# Patient Record
Sex: MALE | Race: BLACK OR AFRICAN AMERICAN | NOT HISPANIC OR LATINO | URBAN - METROPOLITAN AREA
[De-identification: names, ages, dates, MRNs, and addresses within clinical notes are randomized per-mention and may not be internally consistent; named-entity substitution may affect disease eponyms.]

---

## 2019-12-01 ENCOUNTER — EMERGENCY (EMERGENCY)
Facility: HOSPITAL | Age: 74
LOS: 1 days | Discharge: HOME | End: 2019-12-01
Attending: EMERGENCY MEDICINE | Admitting: EMERGENCY MEDICINE
Payer: MEDICARE

## 2019-12-01 VITALS
HEIGHT: 67 IN | HEART RATE: 68 BPM | WEIGHT: 253.09 LBS | OXYGEN SATURATION: 96 % | SYSTOLIC BLOOD PRESSURE: 143 MMHG | RESPIRATION RATE: 18 BRPM | TEMPERATURE: 98 F | DIASTOLIC BLOOD PRESSURE: 76 MMHG

## 2019-12-01 PROCEDURE — 71046 X-RAY EXAM CHEST 2 VIEWS: CPT | Mod: 26

## 2019-12-01 PROCEDURE — 99283 EMERGENCY DEPT VISIT LOW MDM: CPT | Mod: 25

## 2019-12-01 PROCEDURE — 71046 X-RAY EXAM CHEST 2 VIEWS: CPT

## 2019-12-01 PROCEDURE — 87631 RESP VIRUS 3-5 TARGETS: CPT

## 2019-12-01 RX ORDER — LISINOPRIL 2.5 MG/1
0 TABLET ORAL
Qty: 0 | Refills: 0 | DISCHARGE

## 2019-12-01 RX ORDER — SPIRONOLACTONE 25 MG/1
0 TABLET, FILM COATED ORAL
Qty: 0 | Refills: 0 | DISCHARGE

## 2019-12-01 RX ORDER — FLUTICASONE PROPIONATE 50 MCG
2 SPRAY, SUSPENSION NASAL
Qty: 1 | Refills: 0
Start: 2019-12-01

## 2019-12-01 RX ORDER — NEBIVOLOL HYDROCHLORIDE 5 MG/1
0 TABLET ORAL
Qty: 0 | Refills: 0 | DISCHARGE

## 2019-12-01 RX ORDER — METFORMIN HYDROCHLORIDE 850 MG/1
0 TABLET ORAL
Qty: 0 | Refills: 0 | DISCHARGE

## 2019-12-01 NOTE — ED PROVIDER NOTE - PATIENT PORTAL LINK FT
Statement Selected You can access the FollowMyHealth Patient Portal offered by [facility] by registering at the following website: [email or web address]. By joining [employer]’s FollowMyHealth portal, you will also be able to view your health information using other applications (apps) compatible with our system.

## 2019-12-01 NOTE — ED PROVIDER NOTE - OBJECTIVE STATEMENT
73 y/o M with PMHx of HTN, DM, Cirrhosis presents to the ED due to productive cough (yellow sputum), chills, rhinorrhea (clear) x 3 days. Patient states that today he had a feeling that he had something in his chest, and expelled a large ball of yellow phlegm, no feelings of bodies in chest after. Patient states he took Promethazine-DM without improvement of symptoms. Denies fever, sore throat, chest pain, SOB, ear pain, headaches, nausea, vomiting, diarrhea, abdominal pain, urinary symptoms. Patient with recent sick contacts, concerned for shingles due to fiance having shingles recently. Former smoker but quit in 1990. 75 y/o M with PMHx of HTN, DM, Cirrhosis presents to the ED due to productive cough (initially clear, today w yellow sputum), chills, rhinorrhea (clear) x 3 days. Patient states that today he had a feeling that he had something in his chest, and expelled a large ball of yellow phlegm, no feelings of foreign bodies in chest after. Patient states he took Promethazine-DM with some improvement of symptoms. Denies fever, + chills, no sore throat, chest pain, SOB, ear pain, headaches, nausea, vomiting, diarrhea, abdominal pain, urinary symptoms. Patient with recent sick contacts w uri sx, concerned for shingles due to fiance having shingles recently. Former smoker but quit in 1990.

## 2019-12-01 NOTE — ED PROVIDER NOTE - NSFOLLOWUPINSTRUCTIONS_ED_ALL_ED_FT
URI    Rest.  Drink plenty of fluids.  Try over the counter medications based on your symptoms; use as directed: coricidin hbp, tylenol and/or ibuprofen or similar for fever, body aches, pain.  Your symptoms will likely last for a total of 7-10 days.      Use flonase (fluticasone) -  2 sprays each nostril once a day for nasal congestion as needed.   Take Tessalon Perles 1 tab every 8 hours as needed for cough.     Follow up with your pmd.  Return for increased pain, difficulty breathing, vomiting, any other concerns.    Viral Respiratory Infection    A viral respiratory infection is an illness that affects parts of the body used for breathing, like the lungs, nose, and throat. It is caused by a germ called a virus. Symptoms can include runny nose, coughing, sneezing, fatigue, body aches, sore throat, fever, or headache. Over the counter medicine can be used to manage the symptoms but the infection typically goes away on its own in 5 to 10 days.     SEEK IMMEDIATE MEDICAL CARE IF YOU HAVE ANY OF THE FOLLOWING SYMPTOMS: shortness of breath, chest pain, fever over 10 days, or lightheadedness/dizziness.

## 2019-12-01 NOTE — ED PROVIDER NOTE - DIAGNOSTIC INTERPRETATION
ER Physician:  Ni Director  CHEST XRAY INTERPRETATION: lungs clear, heart shadow normal, bony structures intact

## 2019-12-01 NOTE — ED PROVIDER NOTE - MUSCULOSKELETAL, MLM
Spine appears normal, range of motion is not limited, no muscle or joint tenderness Spine appears normal, range of motion is not limited, no muscle or joint tenderness, no c/c/e/ttp bilat le

## 2019-12-01 NOTE — ED PROVIDER NOTE - CLINICAL SUMMARY MEDICAL DECISION MAKING FREE TEXT BOX
73 y/o M presents with productive cough, rhinorrhea, chills. Exam unremarkable, likely viral bronchitis causing sputum. Likely viral infection causing flu-like symptoms, will get RSV to r/o flu. CXR to r/o PNA. 73 y/o M presents with productive cough, rhinorrhea, chills. Exam unremarkable, likely viral bronchitis causing sputum. Likely viral infection causing flu-like symptoms, will get flu swab to r/o flu. CXR to r/o PNA.  Patient likely has a viral Upper respiratory infection; no evidence of pneumonia, sepsis or meningitis;   The patient is non toxic appearing. Supportive care including increased fluids and over the counter therapy was advised and discussed.

## 2019-12-01 NOTE — ED PROVIDER NOTE - ENMT, MLM
Pharynx nonerythematous. Airway patent, Nasal mucosa clear. Mouth with normal mucosa. Throat has no vesicles, no oropharyngeal exudates and uvula is midline.

## 2019-12-02 LAB
FLU A RESULT: SIGNIFICANT CHANGE UP
FLU A RESULT: SIGNIFICANT CHANGE UP
FLUAV AG NPH QL: SIGNIFICANT CHANGE UP
FLUBV AG NPH QL: SIGNIFICANT CHANGE UP
RSV RESULT: SIGNIFICANT CHANGE UP
RSV RNA RESP QL NAA+PROBE: SIGNIFICANT CHANGE UP

## 2019-12-06 DIAGNOSIS — J06.9 ACUTE UPPER RESPIRATORY INFECTION, UNSPECIFIED: ICD-10-CM

## 2019-12-06 DIAGNOSIS — I10 ESSENTIAL (PRIMARY) HYPERTENSION: ICD-10-CM

## 2019-12-06 DIAGNOSIS — E11.9 TYPE 2 DIABETES MELLITUS WITHOUT COMPLICATIONS: ICD-10-CM

## 2019-12-06 DIAGNOSIS — R05 COUGH: ICD-10-CM

## 2019-12-06 DIAGNOSIS — Z79.4 LONG TERM (CURRENT) USE OF INSULIN: ICD-10-CM

## 2019-12-06 DIAGNOSIS — Z79.899 OTHER LONG TERM (CURRENT) DRUG THERAPY: ICD-10-CM

## 2020-10-08 ENCOUNTER — EMERGENCY (EMERGENCY)
Facility: HOSPITAL | Age: 75
LOS: 1 days | Discharge: HOME | End: 2020-10-08
Attending: EMERGENCY MEDICINE | Admitting: EMERGENCY MEDICINE
Payer: COMMERCIAL

## 2020-10-08 VITALS
OXYGEN SATURATION: 97 % | HEART RATE: 62 BPM | WEIGHT: 229.94 LBS | DIASTOLIC BLOOD PRESSURE: 71 MMHG | HEIGHT: 67 IN | SYSTOLIC BLOOD PRESSURE: 117 MMHG | TEMPERATURE: 98 F | RESPIRATION RATE: 18 BRPM

## 2020-10-08 VITALS
DIASTOLIC BLOOD PRESSURE: 60 MMHG | RESPIRATION RATE: 17 BRPM | SYSTOLIC BLOOD PRESSURE: 97 MMHG | OXYGEN SATURATION: 98 % | HEART RATE: 63 BPM | TEMPERATURE: 98 F

## 2020-10-08 DIAGNOSIS — Z79.4 LONG TERM (CURRENT) USE OF INSULIN: ICD-10-CM

## 2020-10-08 DIAGNOSIS — R07.81 PLEURODYNIA: ICD-10-CM

## 2020-10-08 DIAGNOSIS — I10 ESSENTIAL (PRIMARY) HYPERTENSION: ICD-10-CM
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
## 2020-10-08 DIAGNOSIS — Z79.899 OTHER LONG TERM (CURRENT) DRUG THERAPY: ICD-10-CM
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
## 2020-10-08 DIAGNOSIS — S20.212A CONTUSION OF LEFT FRONT WALL OF THORAX, INITIAL ENCOUNTER: ICD-10-CM
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
## 2020-10-08 DIAGNOSIS — E11.9 TYPE 2 DIABETES MELLITUS WITHOUT COMPLICATIONS: ICD-10-CM
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
## 2020-10-08 PROBLEM — K74.60 UNSPECIFIED CIRRHOSIS OF LIVER: Chronic | Status: ACTIVE | Noted: 2019-12-01

## 2020-10-08 PROCEDURE — 71101 X-RAY EXAM UNILAT RIBS/CHEST: CPT

## 2020-10-08 PROCEDURE — 99284 EMERGENCY DEPT VISIT MOD MDM: CPT | Mod: 25

## 2020-10-08 PROCEDURE — 99283 EMERGENCY DEPT VISIT LOW MDM: CPT

## 2020-10-08 PROCEDURE — 71101 X-RAY EXAM UNILAT RIBS/CHEST: CPT | Mod: 26,LT

## 2020-10-08 PROCEDURE — 71100 X-RAY EXAM RIBS UNI 2 VIEWS: CPT | Mod: 26

## 2020-10-08 RX ORDER — ACETAMINOPHEN 500 MG
650 TABLET ORAL ONCE
Refills: 0 | Status: COMPLETED | OUTPATIENT
Start: 2020-10-08 | End: 2020-10-08

## 2020-10-08 RX ADMIN — Medication 650 MILLIGRAM(S): at 10:32

## 2020-10-08 NOTE — ED ADULT NURSE NOTE - CHPI ED NUR SYMPTOMS NEG
no disorientation/no laceration/no neck tenderness/no headache/no difficulty bearing weight/no decreased eating/drinking/no loss of consciousness/no dizziness

## 2020-10-08 NOTE — ED ADULT NURSE NOTE - NSIMPLEMENTINTERV_GEN_ALL_ED
Implemented All Universal Safety Interventions:  [place] to call system. Call bell, personal items and telephone within reach. Instruct patient to call for assistance. Room bathroom lighting operational. Non-slip footwear when patient is off stretcher. Physically safe environment: no spills, clutter or unnecessary equipment. Stretcher in lowest position, wheels locked, appropriate side rails in place.
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
## 2020-10-08 NOTE — ED PROVIDER NOTE - PATIENT PORTAL LINK FT
You can access the FollowMyHealth Patient Portal offered by [facility] by registering at the following website: [email or web address]. By joining [employer]’s FollowMyHealth portal, you will also be able to view your health information using other applications (apps) compatible with our system.
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
## 2020-10-08 NOTE — ED PROVIDER NOTE - PHYSICAL EXAMINATION
CONSTITUTIONAL: Well-appearing; well-nourished; in no apparent distress.   HEAD: Normocephalic; atraumatic.   EYES: PERRL; EOM intact; conjunctiva and sclera clear  ENT: normal nose; no rhinorrhea; normal pharynx with no erythema or lesions.   NECK: Supple; non-tender;   CARDIOVASCULAR: Normal S1, S2; no murmurs, rubs, or gallops. Regular rate and rhythm.   RESPIRATORY: Breathing easily; breath sounds clear and equal bilaterally; no wheezes, rhonchi, or rales.  MSK: Tenderness to left lateral lower rib. No deformity or step-off.   EXT: No cyanosis or edema; N/V intact  SKIN: Normal for age and race; warm; dry; good turgor; no apparent lesions or rash.

## 2020-10-08 NOTE — ED PROVIDER NOTE - DIAGNOSTIC INTERPRETATION
ER PA: Julia Sim  rib xr INTERPRETATION:  no acute fracture; no soft tissue swelling noted; normal bony alignment.
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
## 2022-03-11 NOTE — ED ADULT NURSE NOTE - DOES PATIENT HAVE ADVANCE DIRECTIVE
Impression: Age-related nuclear cataract, left eye: H25.12. Plan: Patient notes glare. Exam demonstrates a significant PSC cataract OS. Discussed R/B/A of surgery vs observation. Rec evaluation for surgery OS.
Impression: Presence of intraocular lens: Z96.1. Plan: PC IOL in good position. Observe.
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 OD: s/p surgery x 2, last 10/2/2020- NVP, PPV/MP/SOin OS: s/p PPV/MP/ EL (EJQ) 7/15/2020 Plan: + fibrosis OD under SO. Will have patient follow up with NVP to decide whether SO removal is indicated OD. Retina appears stable OS. Will re-evaluate after eval improved after cataract surgery Os. 

RTC 3 mo with OCT OU, poss Avastin (NVP)
No

## 2023-07-13 NOTE — ED PROVIDER NOTE - AGGRAVATING FACTORS
Consultation - GI   Bharat Crocker 66 y.o. male MRN: [id number]  Unit/Bed#: -01 Encounter: [id number]      Assessment/Plan     Assessment:  Bharat Crocker is a 66 [facility]. Gabriela Dupont DO  Consult ordered by: Shelton Rodríguez none

## 2024-09-05 ENCOUNTER — INPATIENT (INPATIENT)
Facility: HOSPITAL | Age: 79
LOS: 1 days | Discharge: HOME | DRG: 690 | End: 2024-09-07
Attending: STUDENT IN AN ORGANIZED HEALTH CARE EDUCATION/TRAINING PROGRAM | Admitting: STUDENT IN AN ORGANIZED HEALTH CARE EDUCATION/TRAINING PROGRAM
Payer: MEDICARE

## 2024-09-05 VITALS — WEIGHT: 218.04 LBS | HEIGHT: 67.5 IN

## 2024-09-05 DIAGNOSIS — Z29.9 ENCOUNTER FOR PROPHYLACTIC MEASURES, UNSPECIFIED: ICD-10-CM

## 2024-09-05 DIAGNOSIS — H81.10 BENIGN PAROXYSMAL VERTIGO, UNSPECIFIED EAR: ICD-10-CM

## 2024-09-05 DIAGNOSIS — E11.9 TYPE 2 DIABETES MELLITUS WITHOUT COMPLICATIONS: ICD-10-CM

## 2024-09-05 DIAGNOSIS — E78.5 HYPERLIPIDEMIA, UNSPECIFIED: ICD-10-CM

## 2024-09-05 DIAGNOSIS — I10 ESSENTIAL (PRIMARY) HYPERTENSION: ICD-10-CM

## 2024-09-05 DIAGNOSIS — Z87.438 PERSONAL HISTORY OF OTHER DISEASES OF MALE GENITAL ORGANS: ICD-10-CM

## 2024-09-05 DIAGNOSIS — I25.10 ATHEROSCLEROTIC HEART DISEASE OF NATIVE CORONARY ARTERY WITHOUT ANGINA PECTORIS: ICD-10-CM

## 2024-09-05 DIAGNOSIS — R26.81 UNSTEADINESS ON FEET: ICD-10-CM

## 2024-09-05 DIAGNOSIS — N39.0 URINARY TRACT INFECTION, SITE NOT SPECIFIED: ICD-10-CM

## 2024-09-05 DIAGNOSIS — G62.9 POLYNEUROPATHY, UNSPECIFIED: ICD-10-CM

## 2024-09-05 LAB
ADD ON TEST-SPECIMEN IN LAB: SIGNIFICANT CHANGE UP
ANION GAP SERPL CALC-SCNC: 10 MMOL/L — SIGNIFICANT CHANGE UP (ref 5–17)
APPEARANCE UR: CLEAR — SIGNIFICANT CHANGE UP
BACTERIA # UR AUTO: ABNORMAL /HPF
BASOPHILS # BLD AUTO: 0.05 K/UL — SIGNIFICANT CHANGE UP (ref 0–0.2)
BASOPHILS NFR BLD AUTO: 0.6 % — SIGNIFICANT CHANGE UP (ref 0–2)
BILIRUB UR-MCNC: NEGATIVE — SIGNIFICANT CHANGE UP
BUN SERPL-MCNC: 11 MG/DL — SIGNIFICANT CHANGE UP (ref 7–23)
CALCIUM SERPL-MCNC: 9.7 MG/DL — SIGNIFICANT CHANGE UP (ref 8.4–10.5)
CAST: 1 /LPF — SIGNIFICANT CHANGE UP (ref 0–4)
CHLORIDE SERPL-SCNC: 98 MMOL/L — SIGNIFICANT CHANGE UP (ref 96–108)
CO2 SERPL-SCNC: 28 MMOL/L — SIGNIFICANT CHANGE UP (ref 22–31)
COLOR SPEC: YELLOW — SIGNIFICANT CHANGE UP
CREAT SERPL-MCNC: 1.09 MG/DL — SIGNIFICANT CHANGE UP (ref 0.5–1.3)
DIFF PNL FLD: NEGATIVE — SIGNIFICANT CHANGE UP
EGFR: 70 ML/MIN/1.73M2 — SIGNIFICANT CHANGE UP
EGFR: 70 ML/MIN/1.73M2 — SIGNIFICANT CHANGE UP
EOSINOPHIL # BLD AUTO: 0.14 K/UL — SIGNIFICANT CHANGE UP (ref 0–0.5)
EOSINOPHIL NFR BLD AUTO: 1.6 % — SIGNIFICANT CHANGE UP (ref 0–6)
GLUCOSE SERPL-MCNC: 95 MG/DL — SIGNIFICANT CHANGE UP (ref 70–99)
GLUCOSE UR QL: NEGATIVE MG/DL — SIGNIFICANT CHANGE UP
HCT VFR BLD CALC: 43.1 % — SIGNIFICANT CHANGE UP (ref 39–50)
HGB BLD-MCNC: 14.1 G/DL — SIGNIFICANT CHANGE UP (ref 13–17)
IMM GRANULOCYTES NFR BLD AUTO: 0.2 % — SIGNIFICANT CHANGE UP (ref 0–0.9)
KETONES UR-MCNC: NEGATIVE MG/DL — SIGNIFICANT CHANGE UP
LEUKOCYTE ESTERASE UR-ACNC: ABNORMAL
LYMPHOCYTES # BLD AUTO: 1.86 K/UL — SIGNIFICANT CHANGE UP (ref 1–3.3)
LYMPHOCYTES # BLD AUTO: 21.7 % — SIGNIFICANT CHANGE UP (ref 13–44)
MCHC RBC-ENTMCNC: 28.5 PG — SIGNIFICANT CHANGE UP (ref 27–34)
MCHC RBC-ENTMCNC: 32.7 GM/DL — SIGNIFICANT CHANGE UP (ref 32–36)
MCV RBC AUTO: 87.1 FL — SIGNIFICANT CHANGE UP (ref 80–100)
MONOCYTES # BLD AUTO: 0.73 K/UL — SIGNIFICANT CHANGE UP (ref 0–0.9)
MONOCYTES NFR BLD AUTO: 8.5 % — SIGNIFICANT CHANGE UP (ref 2–14)
NEUTROPHILS # BLD AUTO: 5.76 K/UL — SIGNIFICANT CHANGE UP (ref 1.8–7.4)
NEUTROPHILS NFR BLD AUTO: 67.4 % — SIGNIFICANT CHANGE UP (ref 43–77)
NITRITE UR-MCNC: POSITIVE
NRBC # BLD: 0 /100 WBCS — SIGNIFICANT CHANGE UP (ref 0–0)
NRBC BLD-RTO: 0 /100 WBCS — SIGNIFICANT CHANGE UP (ref 0–0)
PH UR: 6 — SIGNIFICANT CHANGE UP (ref 5–8)
PLATELET # BLD AUTO: 293 K/UL — SIGNIFICANT CHANGE UP (ref 150–400)
POTASSIUM SERPL-MCNC: 3.7 MMOL/L — SIGNIFICANT CHANGE UP (ref 3.5–5.3)
POTASSIUM SERPL-SCNC: 3.7 MMOL/L — SIGNIFICANT CHANGE UP (ref 3.5–5.3)
PROT UR-MCNC: SIGNIFICANT CHANGE UP MG/DL
RBC # BLD: 4.95 M/UL — SIGNIFICANT CHANGE UP (ref 4.2–5.8)
RBC # FLD: 13.2 % — SIGNIFICANT CHANGE UP (ref 10.3–14.5)
RBC CASTS # UR COMP ASSIST: 1 /HPF — SIGNIFICANT CHANGE UP (ref 0–4)
SODIUM SERPL-SCNC: 136 MMOL/L — SIGNIFICANT CHANGE UP (ref 135–145)
SP GR SPEC: 1.01 — SIGNIFICANT CHANGE UP (ref 1–1.03)
SQUAMOUS # UR AUTO: 1 /HPF — SIGNIFICANT CHANGE UP (ref 0–5)
TROPONIN T, HIGH SENSITIVITY RESULT: 14 NG/L — SIGNIFICANT CHANGE UP (ref 0–51)
UROBILINOGEN FLD QL: 0.2 MG/DL — SIGNIFICANT CHANGE UP (ref 0.2–1)
WBC # BLD: 8.56 K/UL — SIGNIFICANT CHANGE UP (ref 3.8–10.5)
WBC # FLD AUTO: 8.56 K/UL — SIGNIFICANT CHANGE UP (ref 3.8–10.5)
WBC UR QL: 55 /HPF — HIGH (ref 0–5)

## 2024-09-05 PROCEDURE — 70498 CT ANGIOGRAPHY NECK: CPT | Mod: 26,MC

## 2024-09-05 PROCEDURE — 71045 X-RAY EXAM CHEST 1 VIEW: CPT | Mod: 26

## 2024-09-05 PROCEDURE — 99285 EMERGENCY DEPT VISIT HI MDM: CPT

## 2024-09-05 PROCEDURE — 99223 1ST HOSP IP/OBS HIGH 75: CPT

## 2024-09-05 PROCEDURE — 70496 CT ANGIOGRAPHY HEAD: CPT | Mod: 26,MC

## 2024-09-05 RX ORDER — LOSARTAN POTASSIUM 100 MG/1
25 TABLET, FILM COATED ORAL DAILY
Refills: 0 | Status: DISCONTINUED | OUTPATIENT
Start: 2024-09-05 | End: 2024-09-06

## 2024-09-05 RX ORDER — CEFTRIAXONE 500 MG/1
1000 INJECTION, POWDER, FOR SOLUTION INTRAMUSCULAR; INTRAVENOUS ONCE
Refills: 0 | Status: COMPLETED | OUTPATIENT
Start: 2024-09-05 | End: 2024-09-05

## 2024-09-05 RX ORDER — ALBUTEROL SULFATE 2.5 MG/3ML
2 VIAL, NEBULIZER (ML) INHALATION EVERY 6 HOURS
Refills: 0 | Status: DISCONTINUED | OUTPATIENT
Start: 2024-09-05 | End: 2024-09-07

## 2024-09-05 RX ORDER — SPIRONOLACTONE 25 MG
50 TABLET ORAL DAILY
Refills: 0 | Status: DISCONTINUED | OUTPATIENT
Start: 2024-09-05 | End: 2024-09-06

## 2024-09-05 RX ORDER — GABAPENTIN 400 MG/1
300 CAPSULE ORAL THREE TIMES A DAY
Refills: 0 | Status: DISCONTINUED | OUTPATIENT
Start: 2024-09-05 | End: 2024-09-07

## 2024-09-05 RX ORDER — FINASTERIDE 1 MG/1
5 TABLET, FILM COATED ORAL DAILY
Refills: 0 | Status: DISCONTINUED | OUTPATIENT
Start: 2024-09-05 | End: 2024-09-07

## 2024-09-05 RX ORDER — ENOXAPARIN SODIUM 100 MG/ML
40 INJECTION SUBCUTANEOUS EVERY 24 HOURS
Refills: 0 | Status: DISCONTINUED | OUTPATIENT
Start: 2024-09-05 | End: 2024-09-07

## 2024-09-05 RX ORDER — TAMSULOSIN HYDROCHLORIDE 0.4 MG/1
0.4 CAPSULE ORAL AT BEDTIME
Refills: 0 | Status: DISCONTINUED | OUTPATIENT
Start: 2024-09-05 | End: 2024-09-07

## 2024-09-05 RX ORDER — ASPIRIN 325 MG
81 TABLET ORAL DAILY
Refills: 0 | Status: DISCONTINUED | OUTPATIENT
Start: 2024-09-05 | End: 2024-09-07

## 2024-09-05 RX ADMIN — ENOXAPARIN SODIUM 40 MILLIGRAM(S): 100 INJECTION SUBCUTANEOUS at 23:48

## 2024-09-05 RX ADMIN — Medication 1000 MILLILITER(S): at 17:31

## 2024-09-05 RX ADMIN — CEFTRIAXONE 100 MILLIGRAM(S): 500 INJECTION, POWDER, FOR SOLUTION INTRAMUSCULAR; INTRAVENOUS at 18:50

## 2024-09-06 ENCOUNTER — TRANSCRIPTION ENCOUNTER (OUTPATIENT)
Age: 79
End: 2024-09-06

## 2024-09-06 PROBLEM — E11.9 TYPE 2 DIABETES MELLITUS WITHOUT COMPLICATIONS: Chronic | Status: ACTIVE | Noted: 2024-08-20

## 2024-09-06 PROBLEM — E78.5 HYPERLIPIDEMIA, UNSPECIFIED: Chronic | Status: ACTIVE | Noted: 2024-08-20

## 2024-09-06 PROBLEM — N40.0 BENIGN PROSTATIC HYPERPLASIA WITHOUT LOWER URINARY TRACT SYMPTOMS: Chronic | Status: ACTIVE | Noted: 2024-08-20

## 2024-09-06 PROBLEM — I25.10 ATHEROSCLEROTIC HEART DISEASE OF NATIVE CORONARY ARTERY WITHOUT ANGINA PECTORIS: Chronic | Status: ACTIVE | Noted: 2024-08-20

## 2024-09-06 LAB
ANION GAP SERPL CALC-SCNC: 10 MMOL/L — SIGNIFICANT CHANGE UP (ref 5–17)
BASOPHILS # BLD AUTO: 0.06 K/UL — SIGNIFICANT CHANGE UP (ref 0–0.2)
BASOPHILS NFR BLD AUTO: 0.9 % — SIGNIFICANT CHANGE UP (ref 0–2)
BUN SERPL-MCNC: 10 MG/DL — SIGNIFICANT CHANGE UP (ref 7–23)
CALCIUM SERPL-MCNC: 9.7 MG/DL — SIGNIFICANT CHANGE UP (ref 8.4–10.5)
CHLORIDE SERPL-SCNC: 102 MMOL/L — SIGNIFICANT CHANGE UP (ref 96–108)
CO2 SERPL-SCNC: 25 MMOL/L — SIGNIFICANT CHANGE UP (ref 22–31)
CREAT SERPL-MCNC: 0.84 MG/DL — SIGNIFICANT CHANGE UP (ref 0.5–1.3)
EGFR: 90 ML/MIN/1.73M2 — SIGNIFICANT CHANGE UP
EGFR: 90 ML/MIN/1.73M2 — SIGNIFICANT CHANGE UP
EOSINOPHIL # BLD AUTO: 0.12 K/UL — SIGNIFICANT CHANGE UP (ref 0–0.5)
EOSINOPHIL NFR BLD AUTO: 1.7 % — SIGNIFICANT CHANGE UP (ref 0–6)
GLUCOSE BLDC GLUCOMTR-MCNC: 114 MG/DL — HIGH (ref 70–99)
GLUCOSE BLDC GLUCOMTR-MCNC: 80 MG/DL — SIGNIFICANT CHANGE UP (ref 70–99)
GLUCOSE BLDC GLUCOMTR-MCNC: 97 MG/DL — SIGNIFICANT CHANGE UP (ref 70–99)
GLUCOSE SERPL-MCNC: 106 MG/DL — HIGH (ref 70–99)
HCT VFR BLD CALC: 41.9 % — SIGNIFICANT CHANGE UP (ref 39–50)
HGB BLD-MCNC: 13.4 G/DL — SIGNIFICANT CHANGE UP (ref 13–17)
IMM GRANULOCYTES NFR BLD AUTO: 0.3 % — SIGNIFICANT CHANGE UP (ref 0–0.9)
LYMPHOCYTES # BLD AUTO: 1.44 K/UL — SIGNIFICANT CHANGE UP (ref 1–3.3)
LYMPHOCYTES # BLD AUTO: 20.8 % — SIGNIFICANT CHANGE UP (ref 13–44)
MAGNESIUM SERPL-MCNC: 1.8 MG/DL — SIGNIFICANT CHANGE UP (ref 1.6–2.6)
MCHC RBC-ENTMCNC: 28.3 PG — SIGNIFICANT CHANGE UP (ref 27–34)
MCHC RBC-ENTMCNC: 32 GM/DL — SIGNIFICANT CHANGE UP (ref 32–36)
MCV RBC AUTO: 88.4 FL — SIGNIFICANT CHANGE UP (ref 80–100)
MONOCYTES # BLD AUTO: 0.56 K/UL — SIGNIFICANT CHANGE UP (ref 0–0.9)
MONOCYTES NFR BLD AUTO: 8.1 % — SIGNIFICANT CHANGE UP (ref 2–14)
NEUTROPHILS # BLD AUTO: 4.72 K/UL — SIGNIFICANT CHANGE UP (ref 1.8–7.4)
NEUTROPHILS NFR BLD AUTO: 68.2 % — SIGNIFICANT CHANGE UP (ref 43–77)
NRBC # BLD: 0 /100 WBCS — SIGNIFICANT CHANGE UP (ref 0–0)
NRBC BLD-RTO: 0 /100 WBCS — SIGNIFICANT CHANGE UP (ref 0–0)
PHOSPHATE SERPL-MCNC: 3.3 MG/DL — SIGNIFICANT CHANGE UP (ref 2.5–4.5)
PLATELET # BLD AUTO: 281 K/UL — SIGNIFICANT CHANGE UP (ref 150–400)
POTASSIUM SERPL-MCNC: 4 MMOL/L — SIGNIFICANT CHANGE UP (ref 3.5–5.3)
POTASSIUM SERPL-SCNC: 4 MMOL/L — SIGNIFICANT CHANGE UP (ref 3.5–5.3)
RBC # BLD: 4.74 M/UL — SIGNIFICANT CHANGE UP (ref 4.2–5.8)
RBC # FLD: 13.3 % — SIGNIFICANT CHANGE UP (ref 10.3–14.5)
SODIUM SERPL-SCNC: 137 MMOL/L — SIGNIFICANT CHANGE UP (ref 135–145)
WBC # BLD: 6.92 K/UL — SIGNIFICANT CHANGE UP (ref 3.8–10.5)
WBC # FLD AUTO: 6.92 K/UL — SIGNIFICANT CHANGE UP (ref 3.8–10.5)

## 2024-09-06 PROCEDURE — 99233 SBSQ HOSP IP/OBS HIGH 50: CPT | Mod: GC

## 2024-09-06 RX ORDER — DEXTROSE 50 % IN WATER 50 %
15 SYRINGE (ML) INTRAVENOUS ONCE
Refills: 0 | Status: DISCONTINUED | OUTPATIENT
Start: 2024-09-06 | End: 2024-09-07

## 2024-09-06 RX ORDER — SODIUM CHLORIDE 9 G/1000ML
1000 INJECTION, SOLUTION INTRAVENOUS
Refills: 0 | Status: DISCONTINUED | OUTPATIENT
Start: 2024-09-06 | End: 2024-09-07

## 2024-09-06 RX ORDER — MELATONIN 5 MG
3 TABLET ORAL AT BEDTIME
Refills: 0 | Status: DISCONTINUED | OUTPATIENT
Start: 2024-09-06 | End: 2024-09-07

## 2024-09-06 RX ORDER — DEXTROSE 50 % IN WATER 50 %
25 SYRINGE (ML) INTRAVENOUS ONCE
Refills: 0 | Status: DISCONTINUED | OUTPATIENT
Start: 2024-09-06 | End: 2024-09-07

## 2024-09-06 RX ORDER — ACETAMINOPHEN 500 MG/5ML
650 LIQUID (ML) ORAL EVERY 6 HOURS
Refills: 0 | Status: DISCONTINUED | OUTPATIENT
Start: 2024-09-06 | End: 2024-09-07

## 2024-09-06 RX ORDER — DEXTROSE 50 % IN WATER 50 %
12.5 SYRINGE (ML) INTRAVENOUS ONCE
Refills: 0 | Status: DISCONTINUED | OUTPATIENT
Start: 2024-09-06 | End: 2024-09-07

## 2024-09-06 RX ORDER — ONDANSETRON HCL/PF 4 MG/2 ML
4 VIAL (ML) INJECTION EVERY 8 HOURS
Refills: 0 | Status: DISCONTINUED | OUTPATIENT
Start: 2024-09-06 | End: 2024-09-07

## 2024-09-06 RX ORDER — MAGNESIUM, ALUMINUM HYDROXIDE 200-200 MG
30 TABLET,CHEWABLE ORAL EVERY 4 HOURS
Refills: 0 | Status: DISCONTINUED | OUTPATIENT
Start: 2024-09-06 | End: 2024-09-07

## 2024-09-06 RX ORDER — INSULIN LISPRO 100 U/ML
INJECTION, SOLUTION INTRAVENOUS; SUBCUTANEOUS
Refills: 0 | Status: DISCONTINUED | OUTPATIENT
Start: 2024-09-06 | End: 2024-09-07

## 2024-09-06 RX ORDER — CEFTRIAXONE 500 MG/1
1000 INJECTION, POWDER, FOR SOLUTION INTRAMUSCULAR; INTRAVENOUS EVERY 24 HOURS
Refills: 0 | Status: DISCONTINUED | OUTPATIENT
Start: 2024-09-07 | End: 2024-09-07

## 2024-09-06 RX ORDER — GLUCAGON 3 MG/1
1 POWDER NASAL ONCE
Refills: 0 | Status: DISCONTINUED | OUTPATIENT
Start: 2024-09-06 | End: 2024-09-07

## 2024-09-06 RX ADMIN — TAMSULOSIN HYDROCHLORIDE 0.4 MILLIGRAM(S): 0.4 CAPSULE ORAL at 21:22

## 2024-09-06 RX ADMIN — Medication 650 MILLIGRAM(S): at 12:07

## 2024-09-06 RX ADMIN — GABAPENTIN 300 MILLIGRAM(S): 400 CAPSULE ORAL at 13:51

## 2024-09-06 RX ADMIN — GABAPENTIN 300 MILLIGRAM(S): 400 CAPSULE ORAL at 06:11

## 2024-09-06 RX ADMIN — ENOXAPARIN SODIUM 40 MILLIGRAM(S): 100 INJECTION SUBCUTANEOUS at 22:51

## 2024-09-06 RX ADMIN — Medication 81 MILLIGRAM(S): at 12:07

## 2024-09-06 RX ADMIN — Medication 650 MILLIGRAM(S): at 12:37

## 2024-09-06 RX ADMIN — FINASTERIDE 5 MILLIGRAM(S): 1 TABLET, FILM COATED ORAL at 12:07

## 2024-09-06 RX ADMIN — GABAPENTIN 300 MILLIGRAM(S): 400 CAPSULE ORAL at 21:22

## 2024-09-06 RX ADMIN — Medication 3 MILLIGRAM(S): at 21:22

## 2024-09-07 ENCOUNTER — TRANSCRIPTION ENCOUNTER (OUTPATIENT)
Age: 79
End: 2024-09-07

## 2024-09-07 VITALS
OXYGEN SATURATION: 96 % | HEART RATE: 66 BPM | RESPIRATION RATE: 18 BRPM | SYSTOLIC BLOOD PRESSURE: 107 MMHG | DIASTOLIC BLOOD PRESSURE: 70 MMHG | TEMPERATURE: 98 F

## 2024-09-07 LAB
ANION GAP SERPL CALC-SCNC: 7 MMOL/L — SIGNIFICANT CHANGE UP (ref 5–17)
BASOPHILS # BLD AUTO: 0.06 K/UL — SIGNIFICANT CHANGE UP (ref 0–0.2)
BASOPHILS NFR BLD AUTO: 0.9 % — SIGNIFICANT CHANGE UP (ref 0–2)
BUN SERPL-MCNC: 13 MG/DL — SIGNIFICANT CHANGE UP (ref 7–23)
CALCIUM SERPL-MCNC: 9.2 MG/DL — SIGNIFICANT CHANGE UP (ref 8.4–10.5)
CHLORIDE SERPL-SCNC: 101 MMOL/L — SIGNIFICANT CHANGE UP (ref 96–108)
CO2 SERPL-SCNC: 28 MMOL/L — SIGNIFICANT CHANGE UP (ref 22–31)
CREAT SERPL-MCNC: 0.85 MG/DL — SIGNIFICANT CHANGE UP (ref 0.5–1.3)
CULTURE RESULTS: ABNORMAL
EGFR: 90 ML/MIN/1.73M2 — SIGNIFICANT CHANGE UP
EGFR: 90 ML/MIN/1.73M2 — SIGNIFICANT CHANGE UP
EOSINOPHIL # BLD AUTO: 0.11 K/UL — SIGNIFICANT CHANGE UP (ref 0–0.5)
EOSINOPHIL NFR BLD AUTO: 1.7 % — SIGNIFICANT CHANGE UP (ref 0–6)
GLUCOSE BLDC GLUCOMTR-MCNC: 128 MG/DL — HIGH (ref 70–99)
GLUCOSE BLDC GLUCOMTR-MCNC: 95 MG/DL — SIGNIFICANT CHANGE UP (ref 70–99)
GLUCOSE SERPL-MCNC: 135 MG/DL — HIGH (ref 70–99)
HCT VFR BLD CALC: 44 % — SIGNIFICANT CHANGE UP (ref 39–50)
HGB BLD-MCNC: 13.7 G/DL — SIGNIFICANT CHANGE UP (ref 13–17)
IMM GRANULOCYTES NFR BLD AUTO: 0.3 % — SIGNIFICANT CHANGE UP (ref 0–0.9)
LYMPHOCYTES # BLD AUTO: 1.4 K/UL — SIGNIFICANT CHANGE UP (ref 1–3.3)
LYMPHOCYTES # BLD AUTO: 21.5 % — SIGNIFICANT CHANGE UP (ref 13–44)
MAGNESIUM SERPL-MCNC: 1.8 MG/DL — SIGNIFICANT CHANGE UP (ref 1.6–2.6)
MCHC RBC-ENTMCNC: 27.9 PG — SIGNIFICANT CHANGE UP (ref 27–34)
MCHC RBC-ENTMCNC: 31.1 GM/DL — LOW (ref 32–36)
MCV RBC AUTO: 89.6 FL — SIGNIFICANT CHANGE UP (ref 80–100)
MONOCYTES # BLD AUTO: 0.39 K/UL — SIGNIFICANT CHANGE UP (ref 0–0.9)
MONOCYTES NFR BLD AUTO: 6 % — SIGNIFICANT CHANGE UP (ref 2–14)
NEUTROPHILS # BLD AUTO: 4.54 K/UL — SIGNIFICANT CHANGE UP (ref 1.8–7.4)
NEUTROPHILS NFR BLD AUTO: 69.6 % — SIGNIFICANT CHANGE UP (ref 43–77)
NRBC # BLD: 0 /100 WBCS — SIGNIFICANT CHANGE UP (ref 0–0)
NRBC BLD-RTO: 0 /100 WBCS — SIGNIFICANT CHANGE UP (ref 0–0)
PHOSPHATE SERPL-MCNC: 2.9 MG/DL — SIGNIFICANT CHANGE UP (ref 2.5–4.5)
PLATELET # BLD AUTO: 256 K/UL — SIGNIFICANT CHANGE UP (ref 150–400)
POTASSIUM SERPL-MCNC: 4.1 MMOL/L — SIGNIFICANT CHANGE UP (ref 3.5–5.3)
POTASSIUM SERPL-SCNC: 4.1 MMOL/L — SIGNIFICANT CHANGE UP (ref 3.5–5.3)
RBC # BLD: 4.91 M/UL — SIGNIFICANT CHANGE UP (ref 4.2–5.8)
RBC # FLD: 13.2 % — SIGNIFICANT CHANGE UP (ref 10.3–14.5)
SODIUM SERPL-SCNC: 136 MMOL/L — SIGNIFICANT CHANGE UP (ref 135–145)
SPECIMEN SOURCE: SIGNIFICANT CHANGE UP
WBC # BLD: 6.52 K/UL — SIGNIFICANT CHANGE UP (ref 3.8–10.5)
WBC # FLD AUTO: 6.52 K/UL — SIGNIFICANT CHANGE UP (ref 3.8–10.5)

## 2024-09-07 PROCEDURE — 99285 EMERGENCY DEPT VISIT HI MDM: CPT

## 2024-09-07 PROCEDURE — 82962 GLUCOSE BLOOD TEST: CPT

## 2024-09-07 PROCEDURE — 36415 COLL VENOUS BLD VENIPUNCTURE: CPT

## 2024-09-07 PROCEDURE — 81001 URINALYSIS AUTO W/SCOPE: CPT

## 2024-09-07 PROCEDURE — 84484 ASSAY OF TROPONIN QUANT: CPT

## 2024-09-07 PROCEDURE — 87040 BLOOD CULTURE FOR BACTERIA: CPT

## 2024-09-07 PROCEDURE — 87077 CULTURE AEROBIC IDENTIFY: CPT

## 2024-09-07 PROCEDURE — 87086 URINE CULTURE/COLONY COUNT: CPT

## 2024-09-07 PROCEDURE — 70450 CT HEAD/BRAIN W/O DYE: CPT | Mod: MC

## 2024-09-07 PROCEDURE — 97161 PT EVAL LOW COMPLEX 20 MIN: CPT

## 2024-09-07 PROCEDURE — 96374 THER/PROPH/DIAG INJ IV PUSH: CPT

## 2024-09-07 PROCEDURE — 85025 COMPLETE CBC W/AUTO DIFF WBC: CPT

## 2024-09-07 PROCEDURE — 71045 X-RAY EXAM CHEST 1 VIEW: CPT

## 2024-09-07 PROCEDURE — 99239 HOSP IP/OBS DSCHRG MGMT >30: CPT | Mod: GC

## 2024-09-07 PROCEDURE — 84100 ASSAY OF PHOSPHORUS: CPT

## 2024-09-07 PROCEDURE — 70498 CT ANGIOGRAPHY NECK: CPT | Mod: MC

## 2024-09-07 PROCEDURE — 83880 ASSAY OF NATRIURETIC PEPTIDE: CPT

## 2024-09-07 PROCEDURE — 70496 CT ANGIOGRAPHY HEAD: CPT | Mod: MC

## 2024-09-07 PROCEDURE — 80048 BASIC METABOLIC PNL TOTAL CA: CPT

## 2024-09-07 PROCEDURE — 83735 ASSAY OF MAGNESIUM: CPT

## 2024-09-07 RX ORDER — CEFTRIAXONE 500 MG/1
1000 INJECTION, POWDER, FOR SOLUTION INTRAMUSCULAR; INTRAVENOUS EVERY 24 HOURS
Refills: 0 | Status: COMPLETED | OUTPATIENT
Start: 2024-09-07 | End: 2024-09-07

## 2024-09-07 RX ADMIN — GABAPENTIN 300 MILLIGRAM(S): 400 CAPSULE ORAL at 06:50

## 2024-09-07 RX ADMIN — FINASTERIDE 5 MILLIGRAM(S): 1 TABLET, FILM COATED ORAL at 11:37

## 2024-09-07 RX ADMIN — CEFTRIAXONE 100 MILLIGRAM(S): 500 INJECTION, POWDER, FOR SOLUTION INTRAMUSCULAR; INTRAVENOUS at 11:37

## 2024-09-07 RX ADMIN — Medication 81 MILLIGRAM(S): at 11:37

## 2024-09-11 LAB
CULTURE RESULTS: SIGNIFICANT CHANGE UP
CULTURE RESULTS: SIGNIFICANT CHANGE UP
SPECIMEN SOURCE: SIGNIFICANT CHANGE UP
SPECIMEN SOURCE: SIGNIFICANT CHANGE UP

## 2024-09-12 DIAGNOSIS — Z79.84 LONG TERM (CURRENT) USE OF ORAL HYPOGLYCEMIC DRUGS: ICD-10-CM

## 2024-09-12 DIAGNOSIS — Z79.85 LONG-TERM (CURRENT) USE OF INJECTABLE NON-INSULIN ANTIDIABETIC DRUGS: ICD-10-CM

## 2024-09-12 DIAGNOSIS — E11.42 TYPE 2 DIABETES MELLITUS WITH DIABETIC POLYNEUROPATHY: ICD-10-CM

## 2024-09-12 DIAGNOSIS — H81.10 BENIGN PAROXYSMAL VERTIGO, UNSPECIFIED EAR: ICD-10-CM

## 2024-09-12 DIAGNOSIS — I65.23 OCCLUSION AND STENOSIS OF BILATERAL CAROTID ARTERIES: ICD-10-CM

## 2024-09-12 DIAGNOSIS — R42 DIZZINESS AND GIDDINESS: ICD-10-CM

## 2024-09-12 DIAGNOSIS — N40.0 BENIGN PROSTATIC HYPERPLASIA WITHOUT LOWER URINARY TRACT SYMPTOMS: ICD-10-CM

## 2024-09-12 DIAGNOSIS — I95.1 ORTHOSTATIC HYPOTENSION: ICD-10-CM

## 2024-09-12 DIAGNOSIS — Z79.82 LONG TERM (CURRENT) USE OF ASPIRIN: ICD-10-CM

## 2024-09-12 DIAGNOSIS — N39.0 URINARY TRACT INFECTION, SITE NOT SPECIFIED: ICD-10-CM

## 2024-09-12 DIAGNOSIS — I10 ESSENTIAL (PRIMARY) HYPERTENSION: ICD-10-CM

## 2024-09-12 DIAGNOSIS — Z79.899 OTHER LONG TERM (CURRENT) DRUG THERAPY: ICD-10-CM

## 2024-09-12 DIAGNOSIS — I25.10 ATHEROSCLEROTIC HEART DISEASE OF NATIVE CORONARY ARTERY WITHOUT ANGINA PECTORIS: ICD-10-CM

## 2024-09-12 DIAGNOSIS — E78.5 HYPERLIPIDEMIA, UNSPECIFIED: ICD-10-CM

## 2024-10-10 VITALS
HEART RATE: 78 BPM | DIASTOLIC BLOOD PRESSURE: 62 MMHG | RESPIRATION RATE: 18 BRPM | OXYGEN SATURATION: 95 % | TEMPERATURE: 97 F | SYSTOLIC BLOOD PRESSURE: 107 MMHG

## 2024-10-10 LAB
ANION GAP SERPL CALC-SCNC: 13 MMOL/L — SIGNIFICANT CHANGE UP (ref 5–17)
APPEARANCE UR: ABNORMAL
BACTERIA # UR AUTO: ABNORMAL /HPF
BASOPHILS # BLD AUTO: 0.04 K/UL — SIGNIFICANT CHANGE UP (ref 0–0.2)
BASOPHILS NFR BLD AUTO: 0.5 % — SIGNIFICANT CHANGE UP (ref 0–2)
BILIRUB UR-MCNC: NEGATIVE — SIGNIFICANT CHANGE UP
BUN SERPL-MCNC: 14 MG/DL — SIGNIFICANT CHANGE UP (ref 7–23)
CALCIUM SERPL-MCNC: 9.4 MG/DL — SIGNIFICANT CHANGE UP (ref 8.4–10.5)
CAST: 0 /LPF — SIGNIFICANT CHANGE UP (ref 0–4)
CHLORIDE SERPL-SCNC: 102 MMOL/L — SIGNIFICANT CHANGE UP (ref 96–108)
CO2 SERPL-SCNC: 25 MMOL/L — SIGNIFICANT CHANGE UP (ref 22–31)
COLOR SPEC: YELLOW — SIGNIFICANT CHANGE UP
CREAT SERPL-MCNC: 1.17 MG/DL — SIGNIFICANT CHANGE UP (ref 0.5–1.3)
DIFF PNL FLD: ABNORMAL
EGFR: 64 ML/MIN/1.73M2 — SIGNIFICANT CHANGE UP
EOSINOPHIL # BLD AUTO: 0.13 K/UL — SIGNIFICANT CHANGE UP (ref 0–0.5)
EOSINOPHIL NFR BLD AUTO: 1.7 % — SIGNIFICANT CHANGE UP (ref 0–6)
FLUAV AG NPH QL: SIGNIFICANT CHANGE UP
FLUBV AG NPH QL: SIGNIFICANT CHANGE UP
GLUCOSE SERPL-MCNC: 75 MG/DL — SIGNIFICANT CHANGE UP (ref 70–99)
GLUCOSE UR QL: NEGATIVE MG/DL — SIGNIFICANT CHANGE UP
HCT VFR BLD CALC: 41.7 % — SIGNIFICANT CHANGE UP (ref 39–50)
HGB BLD-MCNC: 13.3 G/DL — SIGNIFICANT CHANGE UP (ref 13–17)
IMM GRANULOCYTES NFR BLD AUTO: 0.3 % — SIGNIFICANT CHANGE UP (ref 0–0.9)
KETONES UR-MCNC: ABNORMAL MG/DL
LEUKOCYTE ESTERASE UR-ACNC: ABNORMAL
LYMPHOCYTES # BLD AUTO: 1.32 K/UL — SIGNIFICANT CHANGE UP (ref 1–3.3)
LYMPHOCYTES # BLD AUTO: 17.1 % — SIGNIFICANT CHANGE UP (ref 13–44)
MAGNESIUM SERPL-MCNC: 1.6 MG/DL — SIGNIFICANT CHANGE UP (ref 1.6–2.6)
MCHC RBC-ENTMCNC: 27.8 PG — SIGNIFICANT CHANGE UP (ref 27–34)
MCHC RBC-ENTMCNC: 31.9 GM/DL — LOW (ref 32–36)
MCV RBC AUTO: 87.1 FL — SIGNIFICANT CHANGE UP (ref 80–100)
MONOCYTES # BLD AUTO: 0.84 K/UL — SIGNIFICANT CHANGE UP (ref 0–0.9)
MONOCYTES NFR BLD AUTO: 10.9 % — SIGNIFICANT CHANGE UP (ref 2–14)
NEUTROPHILS # BLD AUTO: 5.37 K/UL — SIGNIFICANT CHANGE UP (ref 1.8–7.4)
NEUTROPHILS NFR BLD AUTO: 69.5 % — SIGNIFICANT CHANGE UP (ref 43–77)
NITRITE UR-MCNC: NEGATIVE — SIGNIFICANT CHANGE UP
NRBC # BLD: 0 /100 WBCS — SIGNIFICANT CHANGE UP (ref 0–0)
PH UR: 7.5 — SIGNIFICANT CHANGE UP (ref 5–8)
PHOSPHATE SERPL-MCNC: 3.9 MG/DL — SIGNIFICANT CHANGE UP (ref 2.5–4.5)
PLATELET # BLD AUTO: 202 K/UL — SIGNIFICANT CHANGE UP (ref 150–400)
POTASSIUM SERPL-MCNC: 3.9 MMOL/L — SIGNIFICANT CHANGE UP (ref 3.5–5.3)
POTASSIUM SERPL-SCNC: 3.9 MMOL/L — SIGNIFICANT CHANGE UP (ref 3.5–5.3)
PROT UR-MCNC: 100 MG/DL
RBC # BLD: 4.79 M/UL — SIGNIFICANT CHANGE UP (ref 4.2–5.8)
RBC # FLD: 13.4 % — SIGNIFICANT CHANGE UP (ref 10.3–14.5)
RBC CASTS # UR COMP ASSIST: 5 /HPF — HIGH (ref 0–4)
RSV RNA NPH QL NAA+NON-PROBE: SIGNIFICANT CHANGE UP
SARS-COV-2 RNA SPEC QL NAA+PROBE: SIGNIFICANT CHANGE UP
SODIUM SERPL-SCNC: 140 MMOL/L — SIGNIFICANT CHANGE UP (ref 135–145)
SP GR SPEC: 1.02 — SIGNIFICANT CHANGE UP (ref 1–1.03)
SQUAMOUS # UR AUTO: 1 /HPF — SIGNIFICANT CHANGE UP (ref 0–5)
TROPONIN T, HIGH SENSITIVITY RESULT: 25 NG/L — SIGNIFICANT CHANGE UP (ref 0–51)
UROBILINOGEN FLD QL: 1 MG/DL — SIGNIFICANT CHANGE UP (ref 0.2–1)
WBC # BLD: 7.72 K/UL — SIGNIFICANT CHANGE UP (ref 3.8–10.5)
WBC # FLD AUTO: 7.72 K/UL — SIGNIFICANT CHANGE UP (ref 3.8–10.5)
WBC CLUMPS # UR AUTO: PRESENT
WBC UR QL: 168 /HPF — HIGH (ref 0–5)

## 2024-10-10 PROCEDURE — 99285 EMERGENCY DEPT VISIT HI MDM: CPT

## 2024-10-10 PROCEDURE — 71045 X-RAY EXAM CHEST 1 VIEW: CPT | Mod: 26

## 2024-10-10 PROCEDURE — 70450 CT HEAD/BRAIN W/O DYE: CPT | Mod: 26,MC

## 2024-10-10 RX ORDER — CEFTRIAXONE SODIUM 1 G
1000 VIAL (EA) INJECTION ONCE
Refills: 0 | Status: COMPLETED | OUTPATIENT
Start: 2024-10-10 | End: 2024-10-10

## 2024-10-10 RX ADMIN — Medication 100 MILLIGRAM(S): at 23:37

## 2024-10-10 NOTE — ED ADULT NURSE NOTE - NSFALLHARMRISKINTERV_ED_ALL_ED

## 2024-10-10 NOTE — ED ADULT NURSE NOTE - OBJECTIVE STATEMENT
Pt is a 80 yo M here for generalized weakness and fatigue for past several months, worse the last few days. Admitted here for same issue recently and was dx with UTI, per wife at bedside. States he is usually very talkative but has been more lethargic and also feels his urine has a strong odor. Pt fell out of bed yesterday morning, denies HS, loc. Denies cp, abd pain, n/v/d, vision changes, unilateral weakness.  On exam, pt in NAD, respirations even and unlabored. Falling asleep during assessment.  BLE swelling. Ambulatory into department with RW.

## 2024-10-10 NOTE — ED ADULT TRIAGE NOTE - CHIEF COMPLAINT QUOTE
pt aaox3 c/o worsening mobility issues. pt c/o bilateral lower extremity weakness and decreased appetite for several day.

## 2024-10-11 ENCOUNTER — INPATIENT (INPATIENT)
Facility: HOSPITAL | Age: 79
LOS: 4 days | Discharge: NURSING, REHAB OR LONG-TERM CARE | DRG: 552 | End: 2024-10-16
Attending: HOSPITALIST | Admitting: STUDENT IN AN ORGANIZED HEALTH CARE EDUCATION/TRAINING PROGRAM
Payer: MEDICARE

## 2024-10-11 ENCOUNTER — TRANSCRIPTION ENCOUNTER (OUTPATIENT)
Age: 79
End: 2024-10-11

## 2024-10-11 DIAGNOSIS — N40.0 BENIGN PROSTATIC HYPERPLASIA WITHOUT LOWER URINARY TRACT SYMPTOMS: ICD-10-CM

## 2024-10-11 DIAGNOSIS — I25.10 ATHEROSCLEROTIC HEART DISEASE OF NATIVE CORONARY ARTERY WITHOUT ANGINA PECTORIS: ICD-10-CM

## 2024-10-11 DIAGNOSIS — R29.898 OTHER SYMPTOMS AND SIGNS INVOLVING THE MUSCULOSKELETAL SYSTEM: ICD-10-CM

## 2024-10-11 DIAGNOSIS — N39.0 URINARY TRACT INFECTION, SITE NOT SPECIFIED: ICD-10-CM

## 2024-10-11 DIAGNOSIS — E66.01 MORBID (SEVERE) OBESITY DUE TO EXCESS CALORIES: ICD-10-CM

## 2024-10-11 DIAGNOSIS — Z86.69 PERSONAL HISTORY OF OTHER DISEASES OF THE NERVOUS SYSTEM AND SENSE ORGANS: ICD-10-CM

## 2024-10-11 DIAGNOSIS — E11.9 TYPE 2 DIABETES MELLITUS WITHOUT COMPLICATIONS: ICD-10-CM

## 2024-10-11 DIAGNOSIS — Z29.9 ENCOUNTER FOR PROPHYLACTIC MEASURES, UNSPECIFIED: ICD-10-CM

## 2024-10-11 DIAGNOSIS — I10 ESSENTIAL (PRIMARY) HYPERTENSION: ICD-10-CM

## 2024-10-11 LAB
ALBUMIN SERPL ELPH-MCNC: 3.7 G/DL — SIGNIFICANT CHANGE UP (ref 3.3–5)
ALP SERPL-CCNC: 49 U/L — SIGNIFICANT CHANGE UP (ref 40–120)
ALT FLD-CCNC: 56 U/L — HIGH (ref 10–45)
ANION GAP SERPL CALC-SCNC: 12 MMOL/L — SIGNIFICANT CHANGE UP (ref 5–17)
AST SERPL-CCNC: 137 U/L — HIGH (ref 10–40)
BASOPHILS # BLD AUTO: 0.04 K/UL — SIGNIFICANT CHANGE UP (ref 0–0.2)
BASOPHILS NFR BLD AUTO: 0.4 % — SIGNIFICANT CHANGE UP (ref 0–2)
BILIRUB SERPL-MCNC: 0.6 MG/DL — SIGNIFICANT CHANGE UP (ref 0.2–1.2)
BLD GP AB SCN SERPL QL: NEGATIVE — SIGNIFICANT CHANGE UP
BUN SERPL-MCNC: 12 MG/DL — SIGNIFICANT CHANGE UP (ref 7–23)
CALCIUM SERPL-MCNC: 9.2 MG/DL — SIGNIFICANT CHANGE UP (ref 8.4–10.5)
CHLORIDE SERPL-SCNC: 101 MMOL/L — SIGNIFICANT CHANGE UP (ref 96–108)
CO2 SERPL-SCNC: 26 MMOL/L — SIGNIFICANT CHANGE UP (ref 22–31)
CREAT SERPL-MCNC: 0.88 MG/DL — SIGNIFICANT CHANGE UP (ref 0.5–1.3)
EGFR: 89 ML/MIN/1.73M2 — SIGNIFICANT CHANGE UP
EOSINOPHIL # BLD AUTO: 0.14 K/UL — SIGNIFICANT CHANGE UP (ref 0–0.5)
EOSINOPHIL NFR BLD AUTO: 1.3 % — SIGNIFICANT CHANGE UP (ref 0–6)
GLUCOSE BLDC GLUCOMTR-MCNC: 107 MG/DL — HIGH (ref 70–99)
GLUCOSE BLDC GLUCOMTR-MCNC: 108 MG/DL — HIGH (ref 70–99)
GLUCOSE BLDC GLUCOMTR-MCNC: 121 MG/DL — HIGH (ref 70–99)
GLUCOSE BLDC GLUCOMTR-MCNC: 124 MG/DL — HIGH (ref 70–99)
GLUCOSE SERPL-MCNC: 99 MG/DL — SIGNIFICANT CHANGE UP (ref 70–99)
HCT VFR BLD CALC: 43 % — SIGNIFICANT CHANGE UP (ref 39–50)
HGB BLD-MCNC: 13.7 G/DL — SIGNIFICANT CHANGE UP (ref 13–17)
IMM GRANULOCYTES NFR BLD AUTO: 0.3 % — SIGNIFICANT CHANGE UP (ref 0–0.9)
LYMPHOCYTES # BLD AUTO: 1.24 K/UL — SIGNIFICANT CHANGE UP (ref 1–3.3)
LYMPHOCYTES # BLD AUTO: 11.3 % — LOW (ref 13–44)
MAGNESIUM SERPL-MCNC: 1.8 MG/DL — SIGNIFICANT CHANGE UP (ref 1.6–2.6)
MCHC RBC-ENTMCNC: 27.9 PG — SIGNIFICANT CHANGE UP (ref 27–34)
MCHC RBC-ENTMCNC: 31.9 GM/DL — LOW (ref 32–36)
MCV RBC AUTO: 87.6 FL — SIGNIFICANT CHANGE UP (ref 80–100)
MONOCYTES # BLD AUTO: 0.73 K/UL — SIGNIFICANT CHANGE UP (ref 0–0.9)
MONOCYTES NFR BLD AUTO: 6.6 % — SIGNIFICANT CHANGE UP (ref 2–14)
NEUTROPHILS # BLD AUTO: 8.82 K/UL — HIGH (ref 1.8–7.4)
NEUTROPHILS NFR BLD AUTO: 80.1 % — HIGH (ref 43–77)
NRBC # BLD: 0 /100 WBCS — SIGNIFICANT CHANGE UP (ref 0–0)
PHOSPHATE SERPL-MCNC: 3.3 MG/DL — SIGNIFICANT CHANGE UP (ref 2.5–4.5)
PLATELET # BLD AUTO: 196 K/UL — SIGNIFICANT CHANGE UP (ref 150–400)
POTASSIUM SERPL-MCNC: 3.6 MMOL/L — SIGNIFICANT CHANGE UP (ref 3.5–5.3)
POTASSIUM SERPL-SCNC: 3.6 MMOL/L — SIGNIFICANT CHANGE UP (ref 3.5–5.3)
PROT SERPL-MCNC: 7 G/DL — SIGNIFICANT CHANGE UP (ref 6–8.3)
RBC # BLD: 4.91 M/UL — SIGNIFICANT CHANGE UP (ref 4.2–5.8)
RBC # FLD: 13.4 % — SIGNIFICANT CHANGE UP (ref 10.3–14.5)
RH IG SCN BLD-IMP: POSITIVE — SIGNIFICANT CHANGE UP
SODIUM SERPL-SCNC: 139 MMOL/L — SIGNIFICANT CHANGE UP (ref 135–145)
WBC # BLD: 11 K/UL — HIGH (ref 3.8–10.5)
WBC # FLD AUTO: 11 K/UL — HIGH (ref 3.8–10.5)

## 2024-10-11 PROCEDURE — 72148 MRI LUMBAR SPINE W/O DYE: CPT | Mod: 26,MC

## 2024-10-11 PROCEDURE — 99223 1ST HOSP IP/OBS HIGH 75: CPT | Mod: GC

## 2024-10-11 RX ORDER — ZINC SULFATE 25 MG/5ML
220 INJECTION, SOLUTION INTRAVENOUS DAILY
Refills: 0 | Status: DISCONTINUED | OUTPATIENT
Start: 2024-10-11 | End: 2024-10-16

## 2024-10-11 RX ORDER — TAMSULOSIN HCL 0.4 MG
0.4 CAPSULE ORAL AT BEDTIME
Refills: 0 | Status: DISCONTINUED | OUTPATIENT
Start: 2024-10-11 | End: 2024-10-16

## 2024-10-11 RX ORDER — SODIUM CHLORIDE IRRIG SOLUTION 0.9 %
1000 SOLUTION, IRRIGATION IRRIGATION
Refills: 0 | Status: DISCONTINUED | OUTPATIENT
Start: 2024-10-11 | End: 2024-10-16

## 2024-10-11 RX ORDER — ENOXAPARIN SODIUM 150 MG/ML
40 INJECTION SUBCUTANEOUS EVERY 24 HOURS
Refills: 0 | Status: DISCONTINUED | OUTPATIENT
Start: 2024-10-11 | End: 2024-10-12

## 2024-10-11 RX ORDER — INSULIN LISPRO 100/ML
VIAL (ML) SUBCUTANEOUS
Refills: 0 | Status: DISCONTINUED | OUTPATIENT
Start: 2024-10-11 | End: 2024-10-16

## 2024-10-11 RX ORDER — ALCOHOL ANTISEPTIC PADS
15 PADS, MEDICATED (EA) TOPICAL ONCE
Refills: 0 | Status: DISCONTINUED | OUTPATIENT
Start: 2024-10-11 | End: 2024-10-16

## 2024-10-11 RX ORDER — ALCOHOL ANTISEPTIC PADS
25 PADS, MEDICATED (EA) TOPICAL ONCE
Refills: 0 | Status: DISCONTINUED | OUTPATIENT
Start: 2024-10-11 | End: 2024-10-16

## 2024-10-11 RX ORDER — ALCOHOL ANTISEPTIC PADS
12.5 PADS, MEDICATED (EA) TOPICAL ONCE
Refills: 0 | Status: DISCONTINUED | OUTPATIENT
Start: 2024-10-11 | End: 2024-10-16

## 2024-10-11 RX ORDER — GLUCAGON INJECTION, SOLUTION 0.5 MG/.1ML
1 INJECTION, SOLUTION SUBCUTANEOUS ONCE
Refills: 0 | Status: DISCONTINUED | OUTPATIENT
Start: 2024-10-11 | End: 2024-10-16

## 2024-10-11 RX ORDER — GABAPENTIN 800 MG/1
300 TABLET, FILM COATED ORAL EVERY 8 HOURS
Refills: 0 | Status: DISCONTINUED | OUTPATIENT
Start: 2024-10-11 | End: 2024-10-16

## 2024-10-11 RX ORDER — FINASTERIDE 5 MG/1
5 TABLET, FILM COATED ORAL DAILY
Refills: 0 | Status: DISCONTINUED | OUTPATIENT
Start: 2024-10-11 | End: 2024-10-16

## 2024-10-11 RX ORDER — ASPIRIN 325 MG
81 TABLET ORAL DAILY
Refills: 0 | Status: DISCONTINUED | OUTPATIENT
Start: 2024-10-11 | End: 2024-10-16

## 2024-10-11 RX ADMIN — Medication 1 TABLET(S): at 07:35

## 2024-10-11 RX ADMIN — Medication 500 MILLIGRAM(S): at 19:46

## 2024-10-11 RX ADMIN — Medication 0.4 MILLIGRAM(S): at 23:40

## 2024-10-11 RX ADMIN — FINASTERIDE 5 MILLIGRAM(S): 5 TABLET, FILM COATED ORAL at 19:45

## 2024-10-11 RX ADMIN — GABAPENTIN 300 MILLIGRAM(S): 800 TABLET, FILM COATED ORAL at 19:46

## 2024-10-11 RX ADMIN — GABAPENTIN 300 MILLIGRAM(S): 800 TABLET, FILM COATED ORAL at 07:35

## 2024-10-11 RX ADMIN — Medication 81 MILLIGRAM(S): at 19:45

## 2024-10-11 RX ADMIN — GABAPENTIN 300 MILLIGRAM(S): 800 TABLET, FILM COATED ORAL at 23:31

## 2024-10-11 NOTE — PATIENT PROFILE ADULT - FUNCTIONAL ASSESSMENT - BASIC MOBILITY 6.
3-calculated by average/Not able to assess (calculate score using [facility] averaging method)

## 2024-10-11 NOTE — ED PROVIDER NOTE - OBJECTIVE STATEMENT
80 yo M w PMH of HTN, CAD, DM2, BPH, & peripheral neuropathy, lumbar spinal stenosis on prior MRI 2021, p/w BLE weakness and inability to walk, worsening since prior admission for the same 1 mo ago. Yesterday he fell d/t weakness (no injuries). Family notes he is increasingly fatigued and at times somnolent. He has no sig back pain. BLE foot neuropathy at baseline. No saddle paresthesia, bowel symptoms, fever, night sweats, weight loss. Retaining urine in the ED. No flank pain or fever/chills. No other complaints.

## 2024-10-11 NOTE — ED PROVIDER NOTE - NS ED ROS FT
CONSTITUTIONAL: No fever, no chills, +fatigue  EYES: No eye redness, no visual changes  ENT: No ear pain, no sore throat  CARDIOVASCULAR: No chest pain, no palpitations  RESPIRATORY: No cough, no SOB  GI: No abdominal pain, no nausea, no vomiting, no constipation, no diarrhea  GENITOURINARY: No dysuria, no frequency, no hematuria, + retention  MUSCULOSKELETAL: No back pain, no joint pain, no myalgias  SKIN: No rash, no peripheral edema  NEURO: No headache, no confusion, + BLE weakness    ALL OTHER SYSTEMS NEGATIVE.

## 2024-10-11 NOTE — H&P ADULT - NSHPPHYSICALEXAM_GEN_ALL_CORE
.  VITAL SIGNS:  T(C): 36.6 (10-11-24 @ 02:37), Max: 36.7 (10-10-24 @ 22:38)  T(F): 97.8 (10-11-24 @ 02:37), Max: 98 (10-10-24 @ 22:38)  HR: 73 (10-11-24 @ 04:43) (63 - 78)  BP: 99/64 (10-11-24 @ 04:43) (88/58 - 109/66)  BP(mean): --  RR: 18 (10-11-24 @ 02:37) (18 - 18)  SpO2: 97% (10-11-24 @ 02:37) (95% - 98%)  Wt(kg): --    PHYSICAL EXAM:    Constitutional: NAD, lying comfortably in bed  HEENT: NC/AT, MMM  Respiratory: CTA B/L; on RA  Cardiac: +S1/S2; RRR; no murmurs  Gastrointestinal: soft, NT/ND  Genitourinary: felder in place, clear yellow urine, no suprapubic tenderness  Back: spine midline, no midline tenderness. no CVA tenderness.   Extremities: WWP, 1+ bilat LE edema  Vascular: 2+ radial & DP pulses  Dermatologic: skin warm, dry and intact; no rashes, wounds, or scars  Neurologic: AAOx3; CNII-XII grossly intact; Bilat LEs able lift against gravity. NO saddle anesthesia, sensation in tact   Psychiatric: affect and characteristics of appearance, verbalizations, behaviors are appropriate

## 2024-10-11 NOTE — CONSULT NOTE ADULT - ASSESSMENT
I M    79 y o M w/ PMHx HTn, CAD, DM2, BPH, & peripheral neuropathy, lumbar spinal stenosis - p/w 1mo bilat lower ext weakness acutely worsened over last 1wk, along w/ difficulty urinating - found w/ UTI & multiple spinal nerve compressions. Admitted for further mgmt.     Problem/Plan - 1:  ·  Problem: Acute UTI.   ·  Plan: Presented w/ urinary retention. UA w/LE, ++WBC, many bacteria.   UCx from prior admission 9/5 w/ >100K cfu Staph Epi, s/p course IV CTX.   s/p CTX 1g in ED   - Given prior UCx w/ staph epi, start Bactrim 1 DS tab q12h   - f/u UCx  - Segura placed for urinary retention.    Problem/Plan - 2:  ·  Problem: Leg weakness.   ·  Plan: #Bilateral leg weakness  Chronic, seen on prior admission in Sept '24. Likely partially 2/2 chronic BPPV vs other spinal nerve compressions. Was discharged in Sept '24 w/ plan for vestibular rehab - was not able to go because of insurance issues but found a place w/ openings next week.   Weakness also possibly exacerbated iso UTI, & iso known spinal nerve compressions/stenosis.   MR Lumbar spine w/ L4-L5 spinal canal stenosis, L5-S1 nerve root compression, NO cord compression   Plan:   - PT consult  - Pt plan to attent vestibular rehab post-discharge  - UTI mgmt as above  - Ortho consulted.    Problem/Plan - 3:  ·  Problem: BPH (benign prostatic hyperplasia).   ·  Plan: Home meds: Tamsulosin 0.4mg qd, Finasteride 5mg qd  - c/w home meds.    Problem/Plan - 4:  ·  Problem: DM2 (diabetes mellitus, type 2).   ·  Plan: Home meds: Weekly Ozempic injections, Metformin 500mg BID  - DASH CC diet, mISS.    Problem/Plan - 5:  ·  Problem: History of peripheral neuropathy.   ·  Plan: Iso long-standing DM. Home med: Gabapentin 300mg TID   - c/w home med.    Problem/Plan - 6:  ·  Problem: HTN (hypertension).   ·  Plan: Home meds: Spironolactone 50qd, Losartan 25qd  - Holding home meds for now iso soft BPs, restart as appropriate.    Problem/Plan - 7:  ·  Problem: CAD (coronary artery disease).   ·  Plan: Home med ASA 81mg qd  - c/w home med.    Problem/Plan - 8:  ·  Problem: Preventive measure.   ·  Plan: F: None  E: Replete PRN  N: DASH CC  P: Lovenox 40     Dispo: [facility].

## 2024-10-11 NOTE — H&P ADULT - PROBLEM/PLAN-6
[FreeTextEntry1] : 89 yo obese female distant smoker seen on 3/27/18 in [facility]U post hospitalization with pneumonia in February of 2018\par Cardio DOMINGO including Echo, stress test unrevealing in May of 2017\par Blood work and CXR unrevealing in 2017\par Chronic, stable, moderate, 3 block BARRON relieved with 4 minutes of rest\par \par 3/27/18: Doing well.  No cough, fever, dysphagia, orthopnea chest pain or edema\par Continues to refuse vaccination\par \par 11/20/18\par No further cough with GERD measures\par Doing well\par \par 3/17/22\par Hospitalized\par Sent home on continuous 02\par Doing well 
DISPLAY PLAN FREE TEXT

## 2024-10-11 NOTE — PROGRESS NOTE ADULT - SUBJECTIVE AND OBJECTIVE BOX
Lumbar Spine MRI reviewed with Dr. Arthur results not consistent with exam and requires further imaging to access the rest of the spine  Recommending a cervical and thoracic MRI   recommending mobilization with PT   Recommending pain control as needed   Lumbar Spine MRI reviewed with Dr. Arthur results not consistent with exam and requires further imaging to access the rest of the spine  Recommending a cervical and thoracic MRI   recommending mobilization with PT   Recommending pain control as needed      **ADDENDUM**  Patient can follow up with Dr. Arthur in 1-2 weeks.   MRI C/T spine can be done as an Outpatient study

## 2024-10-11 NOTE — H&P ADULT - HISTORY OF PRESENT ILLNESS
78yo M w/ PMHx HTn, CAD, DM2, BPH, & peripheral neuropathy, lumbar spinal stenosis - p/w bilat lower ext weakness & inability to walk which has been worsening since ~1mo ago. Fell the day before presenting to ED which he states was due to weakness. Denies any presyncopal sx, LOC, head strike. Says he's been feeling tired lately but thi sis the first time he's fell in a while. Has been feeling dizzy w/ unsteady gait for past few weeks. Endorses bernarda the hasn't felt the urge to pee, but notices that he feels bloated, is able to void partially but still feels bladder fullness.  No dysuria or hematuria that he's noticed. Denies any recent fever, chills, flank pain, bowel incontinence, or urinary incontinence.     Vitals: HR 97.8F, HR 66, BP 90s-100s/60s. RR 18 sat 98% RA  Labs: CBC wnl, BMP unrevealing (BUN/Cr 14/1.17). UA w/LE, ++WBC, many bacteria. Limited RVP neg.   CT Head non con: No acute intracranial hemorrhage  MR Lumbar spine: No acute fracture. +L5-S1 disc protrusion w/ compression on anterior thecal sac, spinal canal stenosis centrally. Extension of disc protrusion/herniation to left side w/ compromise of L5-S1 foramen, likely exiting nerve root compression. Moderate L4-L5 annular bulge L4-L5 w/ marked facet hypertrophy causing central & bilateral foraminal stenosis.   Consults: Ortho   78yo M w/ PMHx HTn, CAD, DM2, BPH, & peripheral neuropathy, lumbar spinal stenosis - p/w bilat lower ext weakness & inability to walk which has been worsening since ~1mo ago. Fell the day before presenting to ED which he states was due to weakness. Denies any presyncopal sx, LOC, head strike. Says he's been feeling tired lately but this is the first time he's fell in a while. Has been feeling dizzy w/ unsteady gait for past few weeks, but that his leg weakness has felt worse over last few days specifically.  Also endorses that the hasn't felt the urge to pee, but notices that he feels bloated, is able to void partially but still feels bladder fullness.  No dysuria or hematuria that he's noticed. Denies any recent fever, chills, flank pain, bowel incontinence, or urinary incontinence.     Vitals: HR 97.8F, HR 66, BP 90s-100s/60s. RR 18 sat 98% RA  Labs: CBC wnl, BMP unrevealing (BUN/Cr 14/1.17). UA w/LE, ++WBC, many bacteria. Limited RVP neg.   CT Head non con: No acute intracranial hemorrhage  MR Lumbar spine: No acute fracture. +L5-S1 disc protrusion w/ compression on anterior thecal sac, spinal canal stenosis centrally. Extension of disc protrusion/herniation to left side w/ compromise of L5-S1 foramen, likely exiting nerve root compression. Moderate L4-L5 annular bulge L4-L5 w/ marked facet hypertrophy causing central & bilateral foraminal stenosis.   Consults: Ortho

## 2024-10-11 NOTE — PHYSICAL THERAPY INITIAL EVALUATION ADULT - SHOULDER EXTENSION MMT, REHAB EVAL
(4-) good minus, left/(4-) good minus, right
Patient/Caregiver provided printed discharge information.

## 2024-10-11 NOTE — DIETITIAN INITIAL EVALUATION ADULT - PERTINENT MEDS FT
MEDICATIONS  (STANDING):  aspirin enteric coated 81 milliGRAM(s) Oral daily  dextrose 5%. 1000 milliLiter(s) (100 mL/Hr) IV Continuous <Continuous>  dextrose 5%. 1000 milliLiter(s) (50 mL/Hr) IV Continuous <Continuous>  dextrose 50% Injectable 12.5 Gram(s) IV Push once  dextrose 50% Injectable 25 Gram(s) IV Push once  dextrose 50% Injectable 25 Gram(s) IV Push once  finasteride 5 milliGRAM(s) Oral daily  gabapentin 300 milliGRAM(s) Oral every 8 hours  glucagon  Injectable 1 milliGRAM(s) IntraMuscular once  insulin lispro (ADMELOG) corrective regimen sliding scale   SubCutaneous Before meals and at bedtime  tamsulosin 0.4 milliGRAM(s) Oral at bedtime    MEDICATIONS  (PRN):  dextrose Oral Gel 15 Gram(s) Oral once PRN Blood Glucose LESS THAN 70 milliGRAM(s)/deciliter

## 2024-10-11 NOTE — H&P ADULT - PROBLEM SELECTOR PLAN 1
Presented w/ urinary retention. UA w/LE, ++WBC, many bacteria.   UCx from prior admission 9/5 w/ >100K cfu Staph Epi, s/p course IV CTX.   s/p CTX 1g in ED   - Given prior UCx w/ staph epi, start Bactrim 1 DS tab q12h   - f/u UCx  - Segura placed for urinary retention #Bilateral leg weakness  Chronic, seen on prior admission in Sept '24. Likely partially 2/2 chronic BPPV vs other spinal nerve compressions. Was discharged in Sept '24 w/ plan for vestibular rehab - was not able to go because of insurance issues but found a place w/ openings next week.   Weakness also possibly exacerbated iso UTI, & iso known spinal nerve compressions/stenosis.   MR Lumbar spine w/ L4-L5 spinal canal stenosis, L5-S1 nerve root compression, NO cord compression   Plan:   - PT consult  - Pt plan to attent vestibular rehab post-discharge  - UTI mgmt as above  - Ortho consulted

## 2024-10-11 NOTE — DIETITIAN INITIAL EVALUATION ADULT - ADD RECOMMEND
1. Continue with Consistent Carbohydrate, DASH/TLC diet  - RD will continue to monitor POCT BG   - Insulin and fluid needs per team  - Honor food preferences, as medically able  2. Recommend Aaron BID to assist with wound healing  3. Recommend micronutrients to assist with wound healing:  - Ascorbic acid 500 mg BID  - Zinc sulfate 220 mg/d x 14 days total  4. Appreciate weekly weight trends  5. Ongoing diet education

## 2024-10-11 NOTE — CONSULT NOTE ADULT - SUBJECTIVE AND OBJECTIVE BOX
Orthopaedic Surgery Consult Note    For Surgeon:    HPI:  79yMale  Patient is a 79y old  Male who presents with a chief complaint of   HPI:      PAST MEDICAL & SURGICAL HISTORY:  HTN (hypertension)  Cirrhosis  CAD (coronary artery disease)  HLD (hyperlipidemia)  T2DM (type 2 diabetes mellitus)  BPH (benign prostatic hyperplasia)  No significant past surgical history      Vital Signs Last 24 Hrs  T(C): 36.6 (11 Oct 2024 02:09), Max: 36.7 (10 Oct 2024 22:38)  T(F): 97.8 (11 Oct 2024 02:09), Max: 98 (10 Oct 2024 22:38)  HR: 63 (11 Oct 2024 02:09) (63 - 78)  BP: 99/61 (11 Oct 2024 02:09) (99/61 - 109/66)  BP(mean): --  RR: 18 (11 Oct 2024 02:09) (18 - 18)  SpO2: 96% (11 Oct 2024 02:09) (95% - 98%)    Parameters below as of 11 Oct 2024 02:09  Patient On (Oxygen Delivery Method): room air      Physical Exam:  RLE  Sensation: SILT L2-S1  Motor:   L2 (hip flexion)  5/5   L3 (knee extension)  5/5   L4 (ankle dorsiflexion)  5/5   L5 (long toe extension) 5/5   S1 (ankle plantar flexion) 5/5  Reflexes: Normal and symmetric  Negative clonus. Down-going Babinski.    LLE  Sensation: SILT L2-S1  Motor:   L2 (hip flexion)  5/5   L3 (knee extension)  5/5   L4 (ankle dorsiflexion)  5/5   L5 (long toe extension) 5/5   S1 (ankle plantar flexion) 5/5  Reflexes: Normal and symmetric  Negative clonus. Down-going Babinski.                          13.3   7.72  )-----------( 202      ( 10 Oct 2024 19:27 )             41.7     10-10    140  |  102  |  14  ----------------------------<  75  3.9   |  25  |  1.17    Ca    9.4      10 Oct 2024 19:27  Phos  3.9     10-10  Mg     1.6     10-10      Imaging:       A/P: 79M acute on chronic LBP    -Pain control / PT  -f/u L spine MRI       -Discussed with Dr. Ross Pager [phone]   Orthopaedic Surgery Consult Note    For Surgeon:    HPI:  79yMale  Patient is a 79y old  Male who presents with a chief complaint of   HPI:      PAST MEDICAL & SURGICAL HISTORY:  HTN (hypertension)  Cirrhosis  CAD (coronary artery disease)  HLD (hyperlipidemia)  T2DM (type 2 diabetes mellitus)  BPH (benign prostatic hyperplasia)  No significant past surgical history      Vital Signs Last 24 Hrs  T(C): 36.6 (11 Oct 2024 02:09), Max: 36.7 (10 Oct 2024 22:38)  T(F): 97.8 (11 Oct 2024 02:09), Max: 98 (10 Oct 2024 22:38)  HR: 63 (11 Oct 2024 02:09) (63 - 78)  BP: 99/61 (11 Oct 2024 02:09) (99/61 - 109/66)  BP(mean): --  RR: 18 (11 Oct 2024 02:09) (18 - 18)  SpO2: 96% (11 Oct 2024 02:09) (95% - 98%)    Parameters below as of 11 Oct 2024 02:09  Patient On (Oxygen Delivery Method): room air      Physical Exam:  RLE  Sensation: SILT L2-S1  Motor:   L2 (hip flexion)  5/5   L3 (knee extension)  5/5   L4 (ankle dorsiflexion)  5/5   L5 (long toe extension) 5/5   S1 (ankle plantar flexion) 5/5  Reflexes: Normal and symmetric  Negative clonus. Down-going Babinski.    LLE  Sensation: SILT L2-S1  Motor:   L2 (hip flexion)  5/5   L3 (knee extension)  5/5   L4 (ankle dorsiflexion)  5/5   L5 (long toe extension) 5/5   S1 (ankle plantar flexion) 5/5  Reflexes: Normal and symmetric  Negative clonus. Down-going Babinski.                          13.3   7.72  )-----------( 202      ( 10 Oct 2024 19:27 )             41.7     10-10    140  |  102  |  14  ----------------------------<  75  3.9   |  25  |  1.17    Ca    9.4      10 Oct 2024 19:27  Phos  3.9     10-10  Mg     1.6     10-10      Imaging:       A/P: 79M acute on chronic LBP    -Low suspicion for cauda equina syndrome, ok for admission to medicine  -Pain control / PT  -f/u L spine MRI       -Discussed with Dr. Ross Pager [phone]   Orthopaedic Surgery Consult Note    For Surgeon: Prince Arthur MD    HPI:  79M p/w chronic LBP and difficulty ambulating. Had Lumbar MRI 2 years ago that showed degenerative changes. No history of prior spine surgery. Says he had a fall 2 days ago 2/2 feeling unsteady on his feet. +head trauma but already had head CT that was negative. Denies numbness, tingling, or weakness. Does not have any sensory deficits but motor exam limited 2/2 decreased effort from pain. Denies saddle anesthesia or bowel / bladder incontinence.       PAST MEDICAL & SURGICAL HISTORY:  HTN (hypertension)  Cirrhosis  CAD (coronary artery disease)  HLD (hyperlipidemia)  T2DM (type 2 diabetes mellitus)  BPH (benign prostatic hyperplasia)  No significant past surgical history      Vital Signs Last 24 Hrs  T(C): 36.6 (11 Oct 2024 02:09), Max: 36.7 (10 Oct 2024 22:38)  T(F): 97.8 (11 Oct 2024 02:09), Max: 98 (10 Oct 2024 22:38)  HR: 63 (11 Oct 2024 02:09) (63 - 78)  BP: 99/61 (11 Oct 2024 02:09) (99/61 - 109/66)  BP(mean): --  RR: 18 (11 Oct 2024 02:09) (18 - 18)  SpO2: 96% (11 Oct 2024 02:09) (95% - 98%)    Parameters below as of 11 Oct 2024 02:09  Patient On (Oxygen Delivery Method): room air      Physical Exam:  RLE  Sensation: SILT L2-S1  Motor:   L2 (hip flexion)  5/5   L3 (knee extension)  5/5   L4 (ankle dorsiflexion)  5/5   L5 (long toe extension) 5/5   S1 (ankle plantar flexion) 5/5  Reflexes: Normal and symmetric  Negative clonus. Down-going Babinski.    LLE  Sensation: SILT L2-S1  Motor:   L2 (hip flexion)  5/5   L3 (knee extension)  5/5   L4 (ankle dorsiflexion)  5/5   L5 (long toe extension) 5/5   S1 (ankle plantar flexion) 5/5  Reflexes: Normal and symmetric  Negative clonus. Down-going Babinski.                          13.3   7.72  )-----------( 202      ( 10 Oct 2024 19:27 )             41.7     10-10    140  |  102  |  14  ----------------------------<  75  3.9   |  25  |  1.17    Ca    9.4      10 Oct 2024 19:27  Phos  3.9     10-10  Mg     1.6     10-10      Lumbar MRI reviewed: showing chronic degenerative changes, no thecal sac compression      A/P: 79M acute on chronic LBP    -Low suspicion for cauda equina syndrome, ok for admission to medicine  -Pain control / PT  -f/u MRI C / T spine      -Discussed with Dr. Arthur    Ortho Pager [phone]   Orthopaedic Surgery Consult Note    For Surgeon: Prince Arthur MD    HPI:  79M p/w chronic LBP and difficulty ambulating. Had Lumbar MRI 2 years ago that showed degenerative changes. No history of prior spine surgery. Says he had a fall 2 days ago 2/2 feeling unsteady on his feet. +head trauma but already had head CT that was negative. Denies numbness, tingling, or weakness. Does not have any sensory deficits but motor exam limited 2/2 decreased effort from pain. Denies saddle anesthesia or bowel / bladder incontinence.       PAST MEDICAL & SURGICAL HISTORY:  HTN (hypertension)  Cirrhosis  CAD (coronary artery disease)  HLD (hyperlipidemia)  T2DM (type 2 diabetes mellitus)  BPH (benign prostatic hyperplasia)  No significant past surgical history      Vital Signs Last 24 Hrs  T(C): 36.6 (11 Oct 2024 02:09), Max: 36.7 (10 Oct 2024 22:38)  T(F): 97.8 (11 Oct 2024 02:09), Max: 98 (10 Oct 2024 22:38)  HR: 63 (11 Oct 2024 02:09) (63 - 78)  BP: 99/61 (11 Oct 2024 02:09) (99/61 - 109/66)  BP(mean): --  RR: 18 (11 Oct 2024 02:09) (18 - 18)  SpO2: 96% (11 Oct 2024 02:09) (95% - 98%)    Parameters below as of 11 Oct 2024 02:09  Patient On (Oxygen Delivery Method): room air      Physical Exam:  RLE  Sensation: SILT L2-S1  Motor:   L4 (ankle dorsiflexion)  5/5   L5 (long toe extension) 5/5   S1 (ankle plantar flexion) 5/5  Reflexes: Normal and symmetric  Negative clonus. Down-going Babinski.    LLE  Sensation: SILT L2-S1  Motor:    L4 (ankle dorsiflexion)  5/5   L5 (long toe extension) 5/5   S1 (ankle plantar flexion) 5/5  Reflexes: Normal and symmetric  Negative clonus. Down-going Babinski.                          13.3   7.72  )-----------( 202      ( 10 Oct 2024 19:27 )             41.7     10-10    140  |  102  |  14  ----------------------------<  75  3.9   |  25  |  1.17    Ca    9.4      10 Oct 2024 19:27  Phos  3.9     10-10  Mg     1.6     10-10      Lumbar MRI reviewed: showing chronic degenerative changes, no thecal sac compression      A/P: 79M acute on chronic LBP    -Low suspicion for cauda equina syndrome, ok for admission to medicine  -Pain control / PT  -f/u MRI C / T spine  -Avoid steroids 2/2 diabetes history       -Discussed with Dr. Arthur    Ortho Pager [phone]

## 2024-10-11 NOTE — PATIENT PROFILE ADULT - FALL HARM RISK - HARM RISK INTERVENTIONS
Assistance with ambulation/Assistance OOB with selected safe patient handling equipment/Communicate Risk of Fall with Harm to all staff/Monitor gait and stability/Reinforce activity limits and safety measures with patient and family/Sit up slowly, dangle for a short time, stand at bedside before walking/Tailored Fall Risk Interventions/Visual Cue: Yellow wristband and red socks/Bed in lowest position, wheels locked, appropriate side rails in place/Call bell, personal items and telephone in reach/Instruct patient to call for assistance before getting out of bed or chair/Non-slip footwear when patient is out of bed/[place] to call system/Physically safe environment - no spills, clutter or unnecessary equipment/Purposeful Proactive Rounding/Room/bathroom lighting operational, light cord in reach

## 2024-10-11 NOTE — DIETITIAN INITIAL EVALUATION ADULT - PERTINENT LABORATORY DATA
10-11    139  |  101  |  12  ----------------------------<  99  3.6   |  26  |  0.88    Ca    9.2      11 Oct 2024 10:21  Phos  3.3     10-11  Mg     1.8     10-11    TPro  7.0  /  Alb  3.7  /  TBili  0.6  /  DBili  x   /  AST  137[H]  /  ALT  56[H]  /  AlkPhos  49  10-11  POCT Blood Glucose.: 108 mg/dL (10-11-24 @ 12:29)  A1C with Estimated Average Glucose Result: 6.2 % (08-20-24 @ 09:12)

## 2024-10-11 NOTE — DIETITIAN INITIAL EVALUATION ADULT - PROBLEM SELECTOR PLAN 1
Presented w/ urinary retention. UA w/LE, ++WBC, many bacteria.   UCx from prior admission 9/5 w/ >100K cfu Staph Epi, s/p course IV CTX.   s/p CTX 1g in ED   - Given prior UCx w/ staph epi, start Bactrim 1 DS tab q12h   - f/u UCx  - Segura placed for urinary retention

## 2024-10-11 NOTE — ED PROVIDER NOTE - CLINICAL SUMMARY MEDICAL DECISION MAKING FREE TEXT BOX
Pt p/w urinary retention, BLE weakness, gait instability, somnolence/fatigue. C/f multiple underlying factors including cord compression, UTI, BPH, NPH.  No flank pain  Distended bladder confirmed on POCUS  Plan for felder catheter, chem r/o KOSTAS, MRI spine, CT head, send UA  C/f cord compression in setting of known lumbar stenosis, will consult ortho/spine and obtain MRI.

## 2024-10-11 NOTE — CONSULT NOTE ADULT - SUBJECTIVE AND OBJECTIVE BOX
Patient is a 79y old  Male who presents with a chief complaint of UTI, leg weakness (11 Oct 2024 06:46)      HPI:  80yo M w/ PMHx HTn, CAD, DM2, BPH, & peripheral neuropathy, lumbar spinal stenosis - p/w bilat lower ext weakness & inability to walk which has been worsening since ~1mo ago. Fell the day before presenting to ED which he states was due to weakness. Denies any presyncopal sx, LOC, head strike. Says he's been feeling tired lately but this is the first time he's fell in a while. Has been feeling dizzy w/ unsteady gait for past few weeks, but that his leg weakness has felt worse over last few days specifically.  Also endorses that the hasn't felt the urge to pee, but notices that he feels bloated, is able to void partially but still feels bladder fullness.  No dysuria or hematuria that he's noticed. Denies any recent fever, chills, flank pain, bowel incontinence, or urinary incontinence.     Vitals: HR 97.8F, HR 66, BP 90s-100s/60s. RR 18 sat 98% RA  Labs: CBC wnl, BMP unrevealing (BUN/Cr 14/1.17). UA w/LE, ++WBC, many bacteria. Limited RVP neg.   CT Head non con: No acute intracranial hemorrhage  MR Lumbar spine: No acute fracture. +L5-S1 disc protrusion w/ compression on anterior thecal sac, spinal canal stenosis centrally. Extension of disc protrusion/herniation to left side w/ compromise of L5-S1 foramen, likely exiting nerve root compression. Moderate L4-L5 annular bulge L4-L5 w/ marked facet hypertrophy causing central & bilateral foraminal stenosis.   Consults: Ortho   (11 Oct 2024 03:51)    PAST MEDICAL & SURGICAL HISTORY:  HTN (hypertension)      Cirrhosis      CAD (coronary artery disease)      HLD (hyperlipidemia)      T2DM (type 2 diabetes mellitus)      BPH (benign prostatic hyperplasia)      No significant past surgical history        MEDICATIONS  (STANDING):  aspirin enteric coated 81 milliGRAM(s) Oral daily  dextrose 5%. 1000 milliLiter(s) (100 mL/Hr) IV Continuous <Continuous>  dextrose 5%. 1000 milliLiter(s) (50 mL/Hr) IV Continuous <Continuous>  dextrose 50% Injectable 25 Gram(s) IV Push once  dextrose 50% Injectable 12.5 Gram(s) IV Push once  dextrose 50% Injectable 25 Gram(s) IV Push once  finasteride 5 milliGRAM(s) Oral daily  gabapentin 300 milliGRAM(s) Oral every 8 hours  glucagon  Injectable 1 milliGRAM(s) IntraMuscular once  insulin lispro (ADMELOG) corrective regimen sliding scale   SubCutaneous Before meals and at bedtime  tamsulosin 0.4 milliGRAM(s) Oral at bedtime  trimethoprim  160 mG/sulfamethoxazole 800 mG 1 Tablet(s) Oral every 12 hours    MEDICATIONS  (PRN):  dextrose Oral Gel 15 Gram(s) Oral once PRN Blood Glucose LESS THAN 70 milliGRAM(s)/deciliter      FAMILY HISTORY:  No pertinent family history in first degree relatives        CBC Full  -  ( 10 Oct 2024 19:27 )  WBC Count : 7.72 K/uL  RBC Count : 4.79 M/uL  Hemoglobin : 13.3 g/dL  Hematocrit : 41.7 %  Platelet Count - Automated : 202 K/uL  Mean Cell Volume : 87.1 fl  Mean Cell Hemoglobin : 27.8 pg  Mean Cell Hemoglobin Concentration : 31.9 gm/dL  Auto Neutrophil # : 5.37 K/uL  Auto Lymphocyte # : 1.32 K/uL  Auto Monocyte # : 0.84 K/uL  Auto Eosinophil # : 0.13 K/uL  Auto Basophil # : 0.04 K/uL  Auto Neutrophil % : 69.5 %  Auto Lymphocyte % : 17.1 %  Auto Monocyte % : 10.9 %  Auto Eosinophil % : 1.7 %  Auto Basophil % : 0.5 %      10-10    140  |  102  |  14  ----------------------------<  75  3.9   |  25  |  1.17    Ca    9.4      10 Oct 2024 19:27  Phos  3.9     10-10  Mg     1.6     10-10        Urinalysis Basic - ( 10 Oct 2024 22:32 )    Color: Yellow / Appearance: Turbid / S.016 / pH: x  Gluc: x / Ketone: Trace mg/dL  / Bili: Negative / Urobili: 1.0 mg/dL   Blood: x / Protein: 100 mg/dL / Nitrite: Negative   Leuk Esterase: Large / RBC: 5 /HPF /  /HPF   Sq Epi: x / Non Sq Epi: 1 /HPF / Bacteria: Many /HPF        Radiology :     < from: CT Head No Cont (10.10.24 @ 20:15) >  ACC: [id number] EXAM:  CT BRAIN   ORDERED BY: CHRISTINA MALDONADO     PROCEDURE DATE:  10/10/2024          INTERPRETATION:  EXAMINATION: CT HEAD    CLINICAL INDICATION: fall  TECHNIQUE: CT images of the head were obtained without contrast. Coronal   and sagittal reconstructions were performed. Dose reduction techniques   were utilized including but not limited to automated exposure control   (AEC), iterative reconstruction technique, and/or mA and/or kV dose   adjustments based on patient size.  COMPARISON: Head CT 2024.    FINDINGS:        Mild-to-moderate generalized cerebral volume loss, with distention of the   sulci and concomitant ex-vacuo ventricular dilatation. Mild nonspecific   low attenuation in the periventricular and subcortical white matter.    No acute intracranial hemorrhage. No midline shift or herniation. No CT   evidence of acute territorial infarction, although MRI with DWI would be   more sensitive.    The visualized sinuses and mastoids are clear.    Limited views ofthe orbits and visualized soft tissues of the neck,   face, scalp, skull base, and calvarium are otherwise unremarkable.   Calcified sebaceous cysts.    IMPRESSION:    1.  Senescent cerebral changes without acute intracranial hemorrhage.    < from: MR Lumbar Spine No Cont (10.11.24 @ 01:27) >    ACC: [id number] EXAM:  MR SPINE LUMBAR   ORDERED BY: CHRISTINA MALDONADO     PROCEDURE DATE:  10/11/2024          INTERPRETATION:  Initial report created on 10/11/2024 3:37:29 AM EDT  PROCEDURE INFORMATION:  Exam: MR Lumbar Spine Without Contrast.  Exam date and time: 10/10/2024 9:51 PM  Age: 79 years old  Clinical indication: R lower extremity weakness    TECHNIQUE:  Imaging protocol: Magnetic resonance imaging of the lumbar spine without  contrast.    COMPARISON:  No relevant prior studies available.    FINDINGS:  Bones/joints: Alignment is intact. No acute fracture. Disc degeneration   and spondylosis noted at L4-5 and L5-S1 with loss of disc height and   signal within the nucleus pulposus and degenerative endplate changes   predominantly at L5-S1. Disc bulges at L2-3 through L5-S1 flatten the   ventral thecal sac and narrow the BILATERAL neural foramina. Mild central   stenosis at L3-4, moderate central stenosis at L4-5 on a degenerative   basis due to disc bulge, facet osteophytic hypertrophy and redundancy of   ligamentum flavum. Broad-based superimposed RIGHT foraminal and an   paracentral disc herniation at L4-5 flatten the ventral thecal sac and   narrow the RIGHT neural foramen. Small central disc herniation at L5-S1   which abuts the ventral thecal sac.    Spinal cord: Visualized cord, conus medullaris and cauda equina are  unremarkable without compression.  Soft tissues: Intact.    Kidneys and ureters: Lesions are noted involving the partially visualized  kidneys which could be further evaluated with dedicated imaging.    IMPRESSION:  Mild disc degeneration and spondylosis noted at L4-5 and L5-S1 with loss   of disc height and signal within the nucleus pulposus and degenerative   endplate changes predominantly at L5-S1. Disc bulges at L2-3 through   L5-S1 flatten the ventral thecal sac and narrow the BILATERAL neural   foramina. Mild central stenosis at L3-4, moderate central stenosis at   L4-5 on a degenerative basis due to disc bulge, facet osteophytic   hypertrophy and redundancy of ligamentum flavum. Broad-based superimposed   RIGHT foraminal and an paracentral disc herniation at L4-5 flatten the   ventral thecal sac and narrow the RIGHT neural foramen. Small central   disc herniation at L5-S1 which abuts the ventral thecal sac.         Review of Systems : per HPI         Vital Signs Last 24 Hrs  T(C): 36.6 (11 Oct 2024 02:37), Max: 36.7 (10 Oct 2024 22:38)  T(F): 97.8 (11 Oct 2024 02:37), Max: 98 (10 Oct 2024 22:38)  HR: 73 (11 Oct 2024 04:43) (63 - 78)  BP: 99/64 (11 Oct 2024 04:43) (88/58 - 109/66)  BP(mean): --  RR: 18 (11 Oct 2024 02:37) (18 - 18)  SpO2: 97% (11 Oct 2024 02:37) (95% - 98%)    Parameters below as of 11 Oct 2024 02:37  Patient On (Oxygen Delivery Method): room air            Physical Exam:   79 y o man lying comfortably in semi Rock's position , awake , alert , no acute complaints     Head: normocephalic , atraumatic    Eyes: PERRLA , EOMI , no nystagmus , sclera anicteric    ENT / FACE: neg nasal discharge , uvula midline , no oropharyngeal erythema / exudate    Neck: supple , negative JVD , negative carotid bruits , no thyromegaly    Chest: CTA bilaterally , neg wheeze / rhonchi / rales / crackles / egophany    Cardiovascular: regular rate and rhythm , neg murmurs / rubs / gallops    Abdomen: soft , non distended , no tenderness to palpation in all 4 quadrants ,  normal bowel sounds     Lower Extremities: WWP , neg cyanosis /clubbing / edema      Neurologic Exam:     Alert and oriented  x 3    Motor Exam:        > 3+/5 x 4 extremities      Sensation:         intact to light touch x 4 extremities     DTR:           biceps/brachioradialis: equal                            patella/ankle: equal        Gait:  not tested         PM&R Impression: admitted for LE weakness progressing x 1 month and UTI     - no acute pathology on CT brain imaging     - L spinal stenosis      Recommendations / Plan:       1) Physical / Occupational therapy focusing on therapeutic exercises , equipment evaluation , bed mobility/transfer out of bed evaluation , progressive ambulation with assistive devices prn .    2) Current disposition plan recommendation:    pending functional progress

## 2024-10-11 NOTE — PROGRESS NOTE ADULT - PROBLEM SELECTOR PLAN 2
Presented w/ urinary retention. UA w/LE, ++WBC, many bacteria. UCx from prior admission 9/5 w/ >100K cfu Staph Epi, s/p course IV CTX. s/p CTX 1g in ED 10/11 AM.  NO dysuria, increased frequency or infectious symptoms.      Home meds: Tamsulosin 0.4mg qd, Finasteride 5mg qd    Plan  - Despite + UA, will monitor off antibitoics given patient does not have symptoms of infection . retention likely 2/2 to h/o BPH   - Segura placed for urinary retention  - c/w home meds

## 2024-10-11 NOTE — DIETITIAN INITIAL EVALUATION ADULT - OTHER CALCULATIONS
*Using +10% ideal body weight (166.1 pounds) as pt is >120% ideal body weight and current weight likely inaccurate in setting of 2+/3+ edema. Needs adjusted for older age, pressure injury. fluid needs per team. ideal body weight: 151 pounds; % ideal body weight: 190%

## 2024-10-11 NOTE — DIETITIAN INITIAL EVALUATION ADULT - PROBLEM SELECTOR PLAN 2
#Bilateral leg weakness  Chronic, seen on prior admission in Sept '24. Likely partially 2/2 chronic BPPV vs other spinal nerve compressions. Was discharged in Sept '24 w/ plan for vestibular rehab - was not able to go because of insurance issues but found a place w/ openings next week.   Weakness also possibly exacerbated iso UTI, & iso known spinal nerve compressions/stenosis.   MR Lumbar spine w/ L4-L5 spinal canal stenosis, L5-S1 nerve root compression, NO cord compression   Plan:   - PT consult  - Pt plan to attent vestibular rehab post-discharge  - UTI mgmt as above  - Ortho consulted

## 2024-10-11 NOTE — PHYSICAL THERAPY INITIAL EVALUATION ADULT - MODALITIES TREATMENT COMMENTS
Neuro: no focal unilateral neurological deficits noted; bilat UE strength 4-/5; LEs 4- 4+/5 bilat. Sensation intact. Generalized slowness of movement and hesitancy with movement initiation noted; mild UE intention tremors noted bilaterally. Sitting balance intact, standing balance - significantly impaired. Pt. was additionally noted with flat affect, hypophonic speech and impaired motor [planning - suggestive of possible Parkinsonian syndrome.

## 2024-10-11 NOTE — DIETITIAN INITIAL EVALUATION ADULT - ETIOLOGY
related to increased physiological demand for nutrients/wound healing related to excessive energy intake relative to expenditure

## 2024-10-11 NOTE — PROGRESS NOTE ADULT - SUBJECTIVE AND OBJECTIVE BOX
INTERVAL HPI/OVERNIGHT EVENTS: CHRISTINE     SUBJECTIVE: Patient seen and examined at bedside, resting comfortably in bed, and does not appear to be in any acute distress. Patient reports 2 weeks of weakness in his legs prior to his fall yesterday. Pt also reporting associated back pain for which hes been using an unknown patch. Admits to intermittent confusion, dizziness, and bloating. Denies dysuria. Last bowel movement early this morning. Pt purchased a walker 2 weeks ago due to his consistent leg weakness.     Vital Signs Last 24 Hrs  T(C): 36.6 (11 Oct 2024 02:37), Max: 36.7 (10 Oct 2024 22:38)  T(F): 97.8 (11 Oct 2024 02:37), Max: 98 (10 Oct 2024 22:38)  HR: 73 (11 Oct 2024 04:43) (63 - 78)  BP: 99/64 (11 Oct 2024 04:43) (88/58 - 109/66)  BP(mean): --  RR: 18 (11 Oct 2024 02:37) (18 - 18)  SpO2: 97% (11 Oct 2024 02:37) (95% - 98%)    Parameters below as of 11 Oct 2024 02:37  Patient On (Oxygen Delivery Method): room air    PHYSICAL EXAM:  General: in no acute distress  Eyes: EOMI intact bilaterally. Anicteric sclerae, moist conjunctivae  HENT: Moist mucous membranes  Neck: Trachea midline, supple  Lungs: CTA B/L. No wheezes, rales, or rhonchi  Cardiovascular: RRR. No murmurs, rubs, or gallops  Abdomen: Soft, non-tender non-distended; No rebound or guarding  Extremities: WWP, No clubbing, cyanosis or edema  Neurological: Alert and oriented  Skin: Warm and dry. No obvious rash     LABS:                        13.7   11.00 )-----------( 196      ( 11 Oct 2024 10:21 )             43.0     10-11    139  |  101  |  12  ----------------------------<  99  3.6   |  26  |  0.88    Ca    9.2      11 Oct 2024 10:21  Phos  3.3     10-11  Mg     1.8     10-11    TPro  7.0  /  Alb  3.7  /  TBili  0.6  /  DBili  x   /  AST  137[H]  /  ALT  56[H]  /  AlkPhos  49  10-11

## 2024-10-11 NOTE — H&P ADULT - PROBLEM SELECTOR PLAN 2
#Bilateral leg weakness  Chronic, seen on prior admission in Sept '24. Likely partially 2/2 chronic BPPV vs other spinal nerve compressions. Was discharged in Sept '24 w/ plan for vestibular rehab - was not able to go because of insurance issues but found a place w/ openings next week.   Weakness also possibly exacerbated iso UTI, & iso known spinal nerve compressions/stenosis.   MR Lumbar spine w/ L4-L5 spinal canal stenosis, L5-S1 nerve root compression, NO cord compression   Plan:   - PT consult  - Pt plan to attent vestibular rehab post-discharge  - UTI mgmt as above  - Ortho consulted Affects all aspects of care

## 2024-10-11 NOTE — DISCHARGE NOTE PROVIDER - HOSPITAL COURSE
80yo M w/ PMHx HTn, CAD, DM2, BPH, & peripheral neuropathy, lumbar spinal stenosis - p/w bilat lower ext weakness & inability to walk which has been worsening since ~1mo ago. Fell the day before presenting to ED which he states was due to weakness. Ortho consulted and had low suspicion for cauda equina syndrome. Segura placed as patient was retaining in ED. Trial of void done the following day and ___. UA + but patient asymptomatic, antibiotics held given lack of infectious symptoms. Orthostatics obtained and patient was ____.       MRI lumbar spine showed "mild disc degeneration and spondylosis noted at L4-5 and L5-S1 with loss of disc height and signal within the nucleus pulposus and degenerative endplate changes predominantly at L5-S1. Disc bulges at L2-3 through  L5-S1 flatten the ventral thecal sac and narrow the BILATERAL neural foramina. Mild central stenosis at L3-4, moderate central stenosis at L4-5 on a degenerative basis due to disc bulge, facet osteophytic hypertrophy and redundancy of ligamentum flavum. Broad-based superimposed RIGHT foraminal and an paracentral disc herniation at L4-5 flatten the ventral thecal sac and narrow the RIGHT neural foramen. Small central disc herniation at L5-S1 which abuts the ventral thecal sac." Per ortho, this MRI finding did not adequately explain patient's symptoms. Ortho saw patient again and recommended follow up with Dr Arthur in 2 weeks.      #Bilateral leg weakness  Chronic, seen on prior admission in Sept '24. Likely partially 2/2 chronic BPPV vs other spinal nerve compressions. Was discharged in Sept '24 w/ plan for vestibular rehab - was not able to go because of insurance issues but found a place w/ openings next week.   Weakness also possibly exacerbated iso UTI, & iso known spinal nerve compressions/stenosis.   MR Lumbar spine w/ L4-L5 spinal canal stenosis, L5-S1 nerve root compression, NO cord compression     Plan:   - PT recs   - ortho recommends follow up in 2 weeks with Dr Arthur     # BPH (benign prostatic hyperplasia).   Presented w/ urinary retention. UA w/LE, ++WBC, many bacteria. UCx from prior admission 9/5 w/ >100K cfu Staph Epi, s/p course IV CTX. s/p CTX 1g in ED 10/11 AM.  NO dysuria, increased frequency or infectious symptoms. Abx held . Segura placed in ED for retention, trial of void done and ___.      Home meds: Tamsulosin 0.4mg qd, Finasteride 5mg qd    Plan    - c/w home meds.    #DM2 (diabetes mellitus, type 2).    Home meds: Weekly Ozempic injections, Metformin 500mg BID. PT states he has never required insulin. Last A1c wnl     Plan  - DASH CC diet, mISS  - hold metformin while inpatient.    # History of peripheral neuropathy.   Iso long-standing DM. Home med: Gabapentin 300mg TID     - c/w home med.    # HTN (hypertension).    Home meds: Spironolactone 50qd, Losartan 25qd    Plan  - Holding home meds for now iso soft BPs, restart as appropriate.    #CAD (coronary artery disease). Home med ASA 81mg qd  - c/w home med.             80yo M w/ PMHx HTn, CAD, DM2, BPH, & peripheral neuropathy, lumbar spinal stenosis - p/w bilat lower ext weakness & inability to walk which has been worsening since ~1mo ago. Fell the day before presenting to ED which he states was due to weakness. Ortho consulted and had low suspicion for cauda equina syndrome. Segura placed as patient was retaining in ED.  UA + but patient asymptomatic, antibiotics held given lack of infectious symptoms. Patient with intact rectal tone and no saddle anesthesia.       MRI lumbar spine showed "mild disc degeneration and spondylosis noted at L4-5 and L5-S1 with loss of disc height and signal within the nucleus pulposus and degenerative endplate changes predominantly at L5-S1. Disc bulges at L2-3 through  L5-S1 flatten the ventral thecal sac and narrow the BILATERAL neural foramina. Mild central stenosis at L3-4, moderate central stenosis at L4-5 on a degenerative basis due to disc bulge, facet osteophytic hypertrophy and redundancy of ligamentum flavum. Broad-based superimposed RIGHT foraminal and an paracentral disc herniation at L4-5 flatten the ventral thecal sac and narrow the RIGHT neural foramen. Small central disc herniation at L5-S1 which abuts the ventral thecal sac." Per ortho, this MRI finding did not adequately explain patient's symptoms. Ortho saw patient again and recommended follow up with Dr Arthur in 2 weeks.      #Bilateral leg weakness  Chronic, seen on prior admission in Sept '24. Likely partially 2/2 chronic BPPV vs other spinal nerve compressions. Was discharged in Sept '24 w/ plan for vestibular rehab - was not able to go because of insurance issues but found a place w/ openings next week.   Weakness also possibly exacerbated iso UTI, & iso known spinal nerve compressions/stenosis.   MR Lumbar spine w/ L4-L5 spinal canal stenosis, L5-S1 nerve root compression, NO cord compression     Plan:   - PT recs   - ortho recommends follow up in 2 weeks with Dr Arthur     # BPH (benign prostatic hyperplasia).   Presented w/ urinary retention. UA w/LE, ++WBC, many bacteria. UCx from prior admission 9/5 w/ >100K cfu Staph Epi, s/p course IV CTX. s/p CTX 1g in ED 10/11 AM.  NO dysuria, increased frequency or infectious symptoms. Abx held . Segura placed in ED for retention, trial of void done and ___.      Home meds: Tamsulosin 0.4mg qd, Finasteride 5mg qd    Plan    - c/w home meds.    #DM2 (diabetes mellitus, type 2).    Home meds: Weekly Ozempic injections, Metformin 500mg BID. PT states he has never required insulin. Last A1c wnl     Plan  - DASH CC diet, mISS  - hold metformin while inpatient.    # History of peripheral neuropathy.   Iso long-standing DM. Home med: Gabapentin 300mg TID     - c/w home med.    # HTN (hypertension).    Home meds: Spironolactone 50qd, Losartan 25qd    Plan  - Holding home meds for now iso soft BPs, restart as appropriate.    #CAD (coronary artery disease). Home med ASA 81mg qd  - c/w home med.      Physical Exam prior to discharge:  General: in no acute distress  Eyes: EOMI intact bilaterally. Anicteric sclerae, moist conjunctivae. Segura in place.   HENT: Moist mucous membranes  Neck: Trachea midline, supple  Lungs: CTA B/L. No wheezes, rales, or rhonchi  Cardiovascular: RRR. No murmurs, rubs, or gallops  Abdomen: Distended. No guarding and tenderness. Patient with decreased distension compared to yesterday.  : Rectal tone intact. No saddle anesthesia.  Extremities: WWP, No clubbing, cyanosis or edema. LLE cool to touch compared to RLE.   Neurological: Alert and oriented  Skin: Warm and dry. No obvious rash    80yo M w/ PMHx HTn, CAD, DM2, BPH, & peripheral neuropathy, lumbar spinal stenosis - p/w bilat lower ext weakness & inability to walk which has been worsening since ~1mo ago. Fell the day before presenting to ED which he states was due to weakness. Ortho consulted and had low suspicion for cauda equina syndrome. Segura placed as patient was retaining in ED.  UA + but patient asymptomatic, antibiotics held given lack of infectious symptoms. Patient with intact rectal tone and no saddle anesthesia.     MRI lumbar spine showed "mild disc degeneration and spondylosis noted at L4-5 and L5-S1 with loss of disc height and signal within the nucleus pulposus and degenerative endplate changes predominantly at L5-S1. Disc bulges at L2-3 through  L5-S1 flatten the ventral thecal sac and narrow the BILATERAL neural foramina. Mild central stenosis at L3-4, moderate central stenosis at L4-5 on a degenerative basis due to disc bulge, facet osteophytic hypertrophy and redundancy of ligamentum flavum. Broad-based superimposed RIGHT foraminal and an paracentral disc herniation at L4-5 flatten the ventral thecal sac and narrow the RIGHT neural foramen. Small central disc herniation at L5-S1 which abuts the ventral thecal sac." Per ortho, this MRI finding did not adequately explain patient's symptoms. Ortho saw patient again and recommended follow up with Dr Arthur in 2 weeks.     #Bilateral leg weakness  Chronic, seen on prior admission in Sept '24. Likely partially 2/2 spinal nerve compressions and spinal canal stenosis. Was discharged in Sept '24 w/ plan for vestibular rehab - was not able to go because of insurance issues.  MR Lumbar spine w/ L4-L5 spinal canal stenosis, L5-S1 nerve root compression, NO cord compression     Plan:   - PT recs   - ortho recommends follow up in 2 weeks with Dr Arthur     # BPH (benign prostatic hyperplasia).   Presented w/ urinary retention. UA w/LE, ++WBC, many bacteria. UCx from prior admission 9/5 w/ >100K cfu Staph Epi, s/p course IV CTX. s/p CTX 1g in ED 10/11 AM.  NO dysuria, increased frequency or infectious symptoms. Abx held . Segura placed for retention.  Home meds: Tamsulosin 0.4mg qd, Finasteride 5mg qd  Plan  - c/w home meds  - TOV at [facility] once mobility more optimized    #DM2 (diabetes mellitus, type 2).    Home meds: Weekly Ozempic injections, Metformin 500mg BID. PT states he has never required insulin. Last A1c wnl     Plan  - DASH CC diet, mISS  - hold metformin while inpatient.    # History of peripheral neuropathy.   Iso long-standing DM. Home med: Gabapentin 300mg TID     - c/w home med.    # HTN (hypertension).    Home meds: Spironolactone 50qd, Losartan 25qd    Plan  - Holding home meds for now iso soft BPs, restart as appropriate.    #CAD (coronary artery disease). Home med ASA 81mg qd  - c/w home med.    Physical Exam prior to discharge:  General: in no acute distress  Eyes: EOMI intact bilaterally. Anicteric sclerae, moist conjunctivae. Segura in place.   HENT: Moist mucous membranes  Neck: Trachea midline, supple  Lungs: CTA B/L. No wheezes, rales, or rhonchi  Cardiovascular: RRR. No murmurs, rubs, or gallops  Abdomen: mildly distended, soft. No guarding and tenderness. Patient with decreased distension compared to yesterday.  : Rectal tone intact. No saddle anesthesia.  Extremities: WWP, No clubbing, cyanosis or edema.  Neurological: Alert and oriented  Skin: Warm and dry. No obvious rash    78yo M w/ PMHx HTn, CAD, DM2, BPH, & peripheral neuropathy, lumbar spinal stenosis - p/w bilat lower ext weakness & inability to walk which has been worsening since ~1mo ago. Fell the day before presenting to ED which he states was due to weakness. Ortho consulted and had low suspicion for cauda equina syndrome. Segura placed as patient was retaining in ED.  UA + but patient asymptomatic, antibiotics held given lack of infectious symptoms. Patient with intact rectal tone and no saddle anesthesia.     MRI lumbar spine showed "mild disc degeneration and spondylosis noted at L4-5 and L5-S1 with loss of disc height and signal within the nucleus pulposus and degenerative endplate changes predominantly at L5-S1. Disc bulges at L2-3 through  L5-S1 flatten the ventral thecal sac and narrow the BILATERAL neural foramina. Mild central stenosis at L3-4, moderate central stenosis at L4-5 on a degenerative basis due to disc bulge, facet osteophytic hypertrophy and redundancy of ligamentum flavum. Broad-based superimposed RIGHT foraminal and an paracentral disc herniation at L4-5 flatten the ventral thecal sac and narrow the RIGHT neural foramen. Small central disc herniation at L5-S1 which abuts the ventral thecal sac." Per ortho, this MRI finding did not adequately explain patient's symptoms. Ortho saw patient again and recommended follow up with Dr Arthur in 2 weeks.     #Bilateral leg weakness  Chronic, seen on prior admission in Sept '24. Likely partially 2/2 spinal nerve compressions and spinal canal stenosis. Was discharged in Sept '24 w/ plan for vestibular rehab - was not able to go because of insurance issues.  MR Lumbar spine w/ L4-L5 spinal canal stenosis, L5-S1 nerve root compression, NO cord compression     Plan:   - PT recs   - ortho recommends follow up in 2 weeks with Dr Arthur     # BPH (benign prostatic hyperplasia).   Presented w/ urinary retention. UA w/LE, ++WBC, many bacteria. UCx from prior admission 9/5 w/ >100K cfu Staph Epi, s/p course IV CTX. s/p CTX 1g in ED 10/11 AM.  NO dysuria, increased frequency or infectious symptoms. Abx held . Segura placed for retention.  Home meds: Tamsulosin 0.4mg qd, Finasteride 5mg qd  Plan  - c/w home meds  - TOV at [facility] once mobility more optimized    #DM2 (diabetes mellitus, type 2).    Home meds: Weekly Ozempic injections, Metformin 500mg BID. PT states he has never required insulin. Last A1c wnl     Plan  - DASH CC diet, mISS  - hold metformin while inpatient.    # History of peripheral neuropathy.   Iso long-standing DM. Home med: Gabapentin 300mg TID     - c/w home med.    # HTN (hypertension).    Home meds: Spironolactone 50qd, Losartan 25qd    Plan  - Can continue outpatient after PCP visit.     #CAD (coronary artery disease). Home med ASA 81mg qd  - c/w home med.    Physical Exam prior to discharge:  General: in no acute distress  Eyes: EOMI intact bilaterally. Anicteric sclerae, moist conjunctivae. Segura in place.   HENT: Moist mucous membranes  Neck: Trachea midline, supple  Lungs: CTA B/L. No wheezes, rales, or rhonchi  Cardiovascular: RRR. No murmurs, rubs, or gallops  Abdomen: mildly distended, soft. No guarding and tenderness. Patient with decreased distension compared to yesterday.  : Rectal tone intact. No saddle anesthesia.  Extremities: WWP, No clubbing, cyanosis or edema.  Neurological: Alert and oriented  Skin: Warm and dry. No obvious rash    80yo M w/ PMHx HTn, CAD, DM2, BPH, & peripheral neuropathy, lumbar spinal stenosis - p/w bilat lower ext weakness & inability to walk which has been worsening since ~1mo ago. Fell the day before presenting to ED which he states was due to weakness. Ortho consulted and had low suspicion for cauda equina syndrome. Felder placed as patient was retaining in ED.  UA + but patient asymptomatic, antibiotics held given lack of infectious symptoms. Patient with intact rectal tone and no saddle anesthesia.     MRI lumbar spine showed "mild disc degeneration and spondylosis noted at L4-5 and L5-S1 with loss of disc height and signal within the nucleus pulposus and degenerative endplate changes predominantly at L5-S1. Disc bulges at L2-3 through  L5-S1 flatten the ventral thecal sac and narrow the BILATERAL neural foramina. Mild central stenosis at L3-4, moderate central stenosis at L4-5 on a degenerative basis due to disc bulge, facet osteophytic hypertrophy and redundancy of ligamentum flavum. Broad-based superimposed RIGHT foraminal and an paracentral disc herniation at L4-5 flatten the ventral thecal sac and narrow the RIGHT neural foramen. Small central disc herniation at L5-S1 which abuts the ventral thecal sac." Per ortho, this MRI finding did not adequately explain patient's symptoms. Ortho saw patient again and recommended follow up with Dr Arthur in 2 weeks.     #Bilateral leg weakness  Chronic, seen on prior admission in Sept '24. Likely partially 2/2 spinal nerve compressions and spinal canal stenosis. Was discharged in Sept '24 w/ plan for vestibular rehab - was not able to go because of insurance issues.  MR Lumbar spine w/ L4-L5 spinal canal stenosis, L5-S1 nerve root compression, NO cord compression     Plan:   - PT recs   - ortho recommends follow up in 2 weeks with Dr Arthur     #Diarrhea   with fecal incontinence; ruled out infectious causesl negative GIPCR. + abdominal distension. KUB suggestive of possible colonic ileus but no stool burden and patient is moving bowels. likely due to fecal incontinence and likely non infectious.   -follow up with Dr. Arthur in 2 weeks   -continue to monitor electrolytes and replete as needed   -CTM diarrheal output with hydration   -rectal tone is intact and no signs of saddle anesthesia at this time    # Urinary retention  Pt incontinent for multiple days, likely 2/2 to colonic pressure; rectal tone intact, ruled out cauda equina; Imaging with spinal stenosis but no signs of cord compression.   -Failed TOV x3, felder inserted  - 10/10 urine culture with coag neg staph and actiomyces neuii 50-99k (s/p Bactrim and Ceftriaxone)  - GI PCR neg.    # BPH (benign prostatic hyperplasia).   Presented w/ urinary retention. UA w/LE, ++WBC, many bacteria. UCx from prior admission 9/5 w/ >100K cfu Staph Epi, s/p course IV CTX. s/p CTX 1g in ED 10/11 AM.  NO dysuria, increased frequency or infectious symptoms. Abx held . Felder placed for retention.  Home meds: Tamsulosin 0.4mg qd, Finasteride 5mg qd  Plan  - c/w home meds  - TOV at [facility] once mobility more optimized    #DM2 (diabetes mellitus, type 2).    Home meds: Weekly Ozempic injections, Metformin 500mg BID. PT states he has never required insulin. Last A1c wnl     Plan  - DASH CC diet, mISS  - hold metformin while inpatient.    # History of peripheral neuropathy.   Iso long-standing DM. Home med: Gabapentin 300mg TID     - c/w home med.    # HTN (hypertension).    Home meds: Spironolactone 50qd, Losartan 25qd    Plan  - Can continue outpatient after PCP visit.     #CAD (coronary artery disease). Home med ASA 81mg qd  - c/w home med.    Physical Exam prior to discharge:  General: in no acute distress  Eyes: EOMI intact bilaterally. Anicteric sclerae, moist conjunctivae. Felder in place.   HENT: Moist mucous membranes  Neck: Trachea midline, supple  Lungs: CTA B/L. No wheezes, rales, or rhonchi  Cardiovascular: RRR. No murmurs, rubs, or gallops  Abdomen: mildly distended, soft. No guarding and tenderness. Patient with decreased distension compared to yesterday.  : Rectal tone intact. No saddle anesthesia.  Extremities: WWP, No clubbing, cyanosis or edema.  Neurological: Alert and oriented  Skin: Warm and dry. No obvious rash    80yo M w/ PMHx HTn, CAD, DM2, BPH, & peripheral neuropathy, lumbar spinal stenosis - p/w bilat lower ext weakness & inability to walk which has been worsening since ~1mo ago. Fell the day before presenting to ED which he states was due to weakness. Ortho consulted and had low suspicion for cauda equina syndrome. Felder placed as patient was retaining in ED.  UA + but patient asymptomatic, antibiotics held given lack of infectious symptoms. Patient with intact rectal tone and no saddle anesthesia.     MRI lumbar spine showed "mild disc degeneration and spondylosis noted at L4-5 and L5-S1 with loss of disc height and signal within the nucleus pulposus and degenerative endplate changes predominantly at L5-S1. Disc bulges at L2-3 through  L5-S1 flatten the ventral thecal sac and narrow the BILATERAL neural foramina. Mild central stenosis at L3-4, moderate central stenosis at L4-5 on a degenerative basis due to disc bulge, facet osteophytic hypertrophy and redundancy of ligamentum flavum. Broad-based superimposed RIGHT foraminal and an paracentral disc herniation at L4-5 flatten the ventral thecal sac and narrow the RIGHT neural foramen. Small central disc herniation at L5-S1 which abuts the ventral thecal sac." Per ortho, this MRI finding did not adequately explain patient's symptoms. Ortho saw patient again and recommended follow up with Dr Arthur in 2 weeks.     #Bilateral leg weakness  Chronic, seen on prior admission in Sept '24. Likely partially 2/2 spinal nerve compressions and spinal canal stenosis. Was discharged in Sept '24 w/ plan for vestibular rehab - was not able to go because of insurance issues.  MR Lumbar spine w/ L4-L5 spinal canal stenosis, L5-S1 nerve root compression, NO cord compression     Plan:   - PT recs   - ortho recommends follow up in 2 weeks with Dr Arthur     #Diarrhea   with fecal incontinence; ruled out infectious causesl negative GIPCR and negative c diff. + abdominal distension. KUB suggestive of possible colonic ileus but no stool burden and patient is moving bowels. likely due to fecal incontinence and non infectious.   -follow up with Dr. Arthur in 2 weeks   -continue to monitor electrolytes and replete as needed   -CTM diarrheal output with hydration   -rectal tone is intact and no signs of saddle anesthesia at this time    # Urinary retention  Pt incontinent for multiple days, likely 2/2 to colonic pressure; rectal tone intact, ruled out cauda equina; Imaging with spinal stenosis but no signs of cord compression.   -Failed TOV x3, felder inserted  - 10/10 urine culture with coag neg staph and actiomyces neuii 50-99k (s/p Bactrim and Ceftriaxone)  - GI PCR neg.    # BPH (benign prostatic hyperplasia).   Presented w/ urinary retention. UA w/LE, ++WBC, many bacteria. UCx from prior admission 9/5 w/ >100K cfu Staph Epi, s/p course IV CTX. s/p CTX 1g in ED 10/11 AM.  NO dysuria, increased frequency or infectious symptoms. Abx held . Felder placed for retention.  Home meds: Tamsulosin 0.4mg qd, Finasteride 5mg qd  Plan  - c/w home meds  - TOV at [facility] once mobility more optimized    #DM2 (diabetes mellitus, type 2).    Home meds: Weekly Ozempic injections, Metformin 500mg BID. PT states he has never required insulin. Last A1c wnl     Plan  - DASH CC diet, mISS  - hold metformin while inpatient.    # History of peripheral neuropathy.   Iso long-standing DM. Home med: Gabapentin 300mg TID     - c/w home med.    # HTN (hypertension).    Home meds: Spironolactone 50qd, Losartan 25qd    Plan  - Can continue outpatient after PCP visit.     #CAD (coronary artery disease). Home med ASA 81mg qd  - c/w home med.    Physical Exam prior to discharge:  General: in no acute distress  Eyes: EOMI intact bilaterally. Anicteric sclerae, moist conjunctivae. Felder in place.   HENT: Moist mucous membranes  Neck: Trachea midline, supple  Lungs: CTA B/L. No wheezes, rales, or rhonchi  Cardiovascular: RRR. No murmurs, rubs, or gallops  Abdomen: mildly distended, soft. No guarding and tenderness. Patient with decreased distension compared to yesterday.  : Rectal tone intact. No saddle anesthesia.  Extremities: WWP, No clubbing, cyanosis or edema.  Neurological: Alert and oriented  Skin: Warm and dry. No obvious rash

## 2024-10-11 NOTE — H&P ADULT - ASSESSMENT
80yo M w/ PMHx HTn, CAD, DM2, BPH, & peripheral neuropathy, lumbar spinal stenosis - p/w 1mo bilat lower ext weakness acutely worsened over last 1wk, along w/ difficulty urinating - found w/ UTI & multiple spinal nerve compressions. Admitted for further mgmt.

## 2024-10-11 NOTE — ED PROVIDER NOTE - CARE PLAN
1 Principal Discharge DX:	Acute UTI  Secondary Diagnosis:	Gait instability  Secondary Diagnosis:	History of spinal stenosis

## 2024-10-11 NOTE — DIETITIAN INITIAL EVALUATION ADULT - OTHER INFO
Per H&P: 78yo M w/ PMHx HTn, CAD, DM2, BPH, & peripheral neuropathy, lumbar spinal stenosis - p/w bilat lower ext weakness & inability to walk which has been worsening since ~1mo ago. Fell the day before presenting to ED which he states was due to weakness. Denies any presyncopal sx, LOC, head strike. Says he's been feeling tired lately but this is the first time he's fell in a while. Has been feeling dizzy w/ unsteady gait for past few weeks, but that his leg weakness has felt worse over last few days specifically. Also endorses that the hasn't felt the urge to pee, but notices that he feels bloated, is able to void partially but still feels bladder fullness.  No dysuria or hematuria that he's noticed. Denies any recent fever, chills, flank pain, bowel incontinence, or urinary incontinence.     Met with pt this AM at bedside. Pt was seated upright and able to articulate his nutrition hx well. No known food allergies nor food intolerances reported. Pt reported good appetite and PO intake PTA, denied following any specific diet at home. Pt mentioned current good appetite - RD observed pt eating breakfast of eggs and pancakes. Pt denied chewing/swallowing difficulties and nausea/vomiting/diarrhea/constipation, stated last BM 10/10. Pt usual body weight 218 pounds (weight gain likely secondary to edema). RD reviewed Carbohydrate Consistent diet including sources of carbohydrates, portion sizes, pairing protein with carbohydrates, limiting sugar sweetened beverages in diet and the importance of consistent eating pattern to help optimize glycemic control. Nutrition focused physical exam did not reveal any overt signs of muscle or subcutaneous fat wasting at this time.     *Note: Pt with hx T2DM. A1C 6.2% (8/20/24)  - POCT BG x 24 hours:  POCT Blood Glucose.: 108 mg/dL (11 Oct 2024 12:29)  POCT Blood Glucose.: 107 mg/dL (11 Oct 2024 09:12)

## 2024-10-11 NOTE — PROGRESS NOTE ADULT - PROBLEM SELECTOR PLAN 1
#Bilateral leg weakness  Chronic, seen on prior admission in Sept '24. Likely partially 2/2 chronic BPPV vs other spinal nerve compressions. Was discharged in Sept '24 w/ plan for vestibular rehab - was not able to go because of insurance issues but found a place w/ openings next week.   Weakness also possibly exacerbated iso UTI, & iso known spinal nerve compressions/stenosis.   MR Lumbar spine w/ L4-L5 spinal canal stenosis, L5-S1 nerve root compression, NO cord compression   Plan:   - PT consult  - Pt plan to attent vestibular rehab post-discharge  - UTI mgmt as above  - Ortho consulted, to see the patient again today and provide recs

## 2024-10-11 NOTE — DISCHARGE NOTE PROVIDER - NSDCFUSCHEDAPPT_GEN_ALL_CORE_FT
[facility]  ORTHOSURG [facility]  Scheduled Appointment: 10/22/2024

## 2024-10-11 NOTE — DIETITIAN INITIAL EVALUATION ADULT - NS FNS DIET ORDER
Diet, DASH/TLC:   Sodium & Cholesterol Restricted  Consistent Carbohydrate {No Snacks} (CSTCHO) (10-11-24 @ 03:50)

## 2024-10-11 NOTE — PROGRESS NOTE ADULT - PROBLEM SELECTOR PLAN 3
Home meds: Weekly Ozempic injections, Metformin 500mg BID. PT states he has never required insulin. Last A1c wnl     Plan  - DASH CC diet, mISS  - hold metformin while inpatient

## 2024-10-11 NOTE — PHYSICAL THERAPY INITIAL EVALUATION ADULT - PERTINENT HX OF CURRENT PROBLEM, REHAB EVAL
Pt. is a 79 y.o male with h/o spinal stenosis and peripheral neuropathy p/w several weeks of worsening LE weakness and difficulty ambulating. Pt. additionally reports gen weakness, dizziness, and decreased UO.  Pt. has been admitted for further management of UTI and further w/u of LE weakness.

## 2024-10-11 NOTE — DISCHARGE NOTE PROVIDER - NSDCMRMEDTOKEN_GEN_ALL_CORE_FT
Albuterol (Eqv-ProAir HFA) 90 mcg/inh inhalation aerosol: 2 puff(s) inhaled every 6 hours as needed for  shortness of breath and/or wheezing  Aspirin EC 81 mg oral delayed release tablet: 1 tab(s) orally once a day  finasteride 5 mg oral tablet: 1 tab(s) orally once a day (at bedtime)  gabapentin 300 mg oral capsule: 1 cap(s) orally 3 times a day  metFORMIN 500 mg oral tablet: 1 tab(s) orally 2 times a day  Ozempic 2 mg/1.5 mL (0.25 mg or 0.5 mg dose) subcutaneous solution: 1 milligram(s) subcutaneously  spironolactone 50 mg oral tablet: 1 tab(s) orally once a day  tamsulosin 0.4 mg oral capsule: 1 cap(s) orally once a day   Albuterol (Eqv-ProAir HFA) 90 mcg/inh inhalation aerosol: 2 puff(s) inhaled every 6 hours as needed for  shortness of breath and/or wheezing  Aspirin EC 81 mg oral delayed release tablet: 1 tab(s) orally once a day  finasteride 5 mg oral tablet: 1 tab(s) orally once a day (at bedtime)  gabapentin 300 mg oral capsule: 1 cap(s) orally 3 times a day  metFORMIN 500 mg oral tablet: 1 tab(s) orally 2 times a day  Ozempic 2 mg/1.5 mL (0.25 mg or 0.5 mg dose) subcutaneous solution: 1 milligram(s) subcutaneously  tamsulosin 0.4 mg oral capsule: 1 cap(s) orally once a day

## 2024-10-11 NOTE — DISCHARGE NOTE PROVIDER - ATTENDING DISCHARGE PHYSICAL EXAMINATION:
******************************************************  ATTENDING ATTESTATION    I have read and agree with the resident Discharge Note above. Patient seen and discussed with resident team on the day of discharge.     Briefly,   78yo M w/ PMHx HTn, CAD, DM2, BPH, & peripheral neuropathy, lumbar spinal stenosis who presented with 1 month of bilateral LE weakness with difficulty urinating, found to have multi-level disc bulging with lumbar canal stenosis. MRI reviewed by Ortho c/w chronic degenerative changes with low suspicion for cauda equina syndrome and rec to f/u outpatient with outpatient thoracic/cervical MRI. Segura was placed for urinary retention, and pt also developed mild ileus and abd distension which resolved, he is tolerating PO diet without n/v, abd is soft and benign. Also had episodes of diarrhea that improved, GI PCR and C diff were negative. Suspect urinary retention and ileus are 2/2 immobility rather than cauda equina/cord compression given intact rectal tone and no saddle anesthesia.    Physical Exam:  T(C): 36.8  HR: 90  BP: 116/67  RR: 18  SpO2: 96%    Gen: sitting upright in bed at time of exam  HEENT: NCAT, MMM, clear OP  Neck: supple, trachea at midline  CV: RRR, +S1/S2  Pulm: adequate respiratory effort, no increased work of breathing  Abd: soft, NTND  Skin: warm and dry, no new rashes vs prior report  Ext: WWP  Neuro: AOx3, no gross focal neurological deficits  Psych: affect and behavior appropriate    I was physically present for the evaluation and management services provided. I agree with the included history, physical, and plan which I reviewed and edited where appropriate. I spent 33 minutes on direct patient care and discharge planning with more than 50% of the visit spent on counseling and/or coordination of care. ******************************************************  ATTENDING ATTESTATION    I have read and agree with the resident Discharge Note above. Patient seen and discussed with resident team on the day of discharge.     Briefly, 80yo M w/ PMHx HTn, CAD, DM2, BPH, & peripheral neuropathy, lumbar spinal stenosis who presented with 1 month of bilateral LE weakness with difficulty urinating, found to have multi-level disc bulging with lumbar canal stenosis. MRI reviewed by Ortho c/w chronic degenerative changes with low suspicion for cauda equina syndrome and rec to f/u outpatient with outpatient thoracic/cervical MRI. Segura was placed for urinary retention, and pt also developed mild ileus and abd distension which resolved, he is tolerating PO diet without n/v, abd is soft and benign. Also had episodes of diarrhea that improved, GI PCR and C diff were negative. Suspect urinary retention and ileus are 2/2 immobility rather than cauda equina/cord compression given intact rectal tone and no saddle anesthesia.    Physical Exam:  T(C): 36.8  HR: 90  BP: 116/67  RR: 18  SpO2: 96%    Gen: sitting upright in bed at time of exam  HEENT: NCAT, MMM, clear OP  Neck: supple, trachea at midline  CV: RRR, +S1/S2  Pulm: adequate respiratory effort, no increased work of breathing  Abd: soft, NTND  Skin: warm and dry, no new rashes vs prior report  Ext: WWP  Neuro: AOx3, no gross focal neurological deficits  Psych: affect and behavior appropriate    I was physically present for the evaluation and management services provided. I agree with the included history, physical, and plan which I reviewed and edited where appropriate. I spent 33 minutes on direct patient care and discharge planning with more than 50% of the visit spent on counseling and/or coordination of care.

## 2024-10-11 NOTE — DIETITIAN INITIAL EVALUATION ADULT - ORAL INTAKE PTA/DIET HISTORY
No known food allergies nor food intolerances reported. Pt reported good appetite and PO intake PTA, denied following any specific diet at home.

## 2024-10-11 NOTE — H&P ADULT - PROBLEM SELECTOR PLAN 6
Home meds: Spironolactone 50qd, Losartan 25qd  - Holding home meds for now iso soft BPs, restart as appropriate

## 2024-10-11 NOTE — H&P ADULT - NSICDXPASTMEDICALHX_GEN_ALL_CORE_FT
PAST MEDICAL HISTORY:  BPH (benign prostatic hyperplasia)     CAD (coronary artery disease)     Cirrhosis     HLD (hyperlipidemia)     HTN (hypertension)     T2DM (type 2 diabetes mellitus)

## 2024-10-11 NOTE — H&P ADULT - ATTENDING COMMENTS
Patient was seen and examined at bedside on 10/11/2024 at 12 pm. Patient reports that he feels improved but has some leg weakness. Denies SOB, abdominal pain, N/V, urinary symptoms. ROS is otherwise negative. Vitals, labwork and pertinent imaging reviewed. Exam - NAD, AAO x 4, PERRLA, EOMI, MMM, supple neck, chest - CTA b/l, CV - rrr, s1s2, no m/r/g, abd - soft, NTND, + BS, ext - wwp, psych - normal affect, skin - no rash,  - + FC, Neuro - 4/5 strength throughout, light sensation intact    Plan:  -TOV  -Repeat set of vitals  -Start DVT ppx  -Low threshold to restart abx  -PT/OT rec RUYB  -Check orthostatics

## 2024-10-11 NOTE — DISCHARGE NOTE PROVIDER - NSDCCPCAREPLAN_GEN_ALL_CORE_FT
PRINCIPAL DISCHARGE DIAGNOSIS  Diagnosis: Lumbar spinal stenosis  Assessment and Plan of Treatment:       SECONDARY DISCHARGE DIAGNOSES  Diagnosis: BPH (benign prostatic hyperplasia)  Assessment and Plan of Treatment:     Diagnosis: Leg weakness  Assessment and Plan of Treatment:     Diagnosis: Gait instability  Assessment and Plan of Treatment:      PRINCIPAL DISCHARGE DIAGNOSIS  Diagnosis: Lumbar spinal stenosis  Assessment and Plan of Treatment: Lumbar spinal stenosis is the narrowing of the spinal canal in the lower back, often causing pain, numbness, and weakness in the legs. Treatment options include physical therapy, medications, and possibly surgery, depending on severity. Maintaining a healthy weight and regular low-impact exercise can help manage symptoms.  Please follow up with your orthopedic doctor Dr. Arthur on 10/22 @1:30 pm.      SECONDARY DISCHARGE DIAGNOSES  Diagnosis: Diarrhea  Assessment and Plan of Treatment: Non-infectious diarrhea can be caused by factors like stress, certain medications, or food intolerances, leading to frequent loose stools. Treatment often involves dietary changes, hydration, and managing underlying conditions. Stool incontinence, or the inability to control bowel movements, can result from muscle weakness or nerve damage; pelvic floor exercises and medications may help improve control.  Please follow up with your Orthopedic doctor Dr. Arthur 10/22 for further management. In your case we have ruled out infectious causes of diarrhea.    Diagnosis: BPH (benign prostatic hyperplasia)  Assessment and Plan of Treatment: Continue your home medication    Diagnosis: DM2 (diabetes mellitus, type 2)  Assessment and Plan of Treatment: Continue your home medication.    Diagnosis: Gait instability  Assessment and Plan of Treatment: See above recommendation for lumbar spinal stenosis.    Diagnosis: Urinary incontinence  Assessment and Plan of Treatment: Urinary incontinence is the involuntary loss of urine, which can result from factors like age, pregnancy, or certain medical conditions. Treatment options include lifestyle changes, pelvic floor exercises, medications, and, in some cases, surgical interventions. Keeping a bladder diary and discussing symptoms with a healthcare provider can help identify the best management strategies.  A Felder catheter is a thin tube inserted into the bladder to drain urine, often used for patients who cannot urinate independently. Proper Felder care involves maintaining hygiene, ensuring the catheter is securely placed, and monitoring for signs of infection or blockage. Regularly checking the drainage system and emptying the bag when it's full are essential for preventing complications.  In your case, we are unsure what is causing your incontinence but it may be due to your abdomen being distended. Please keep your felder in with plans to do more trial of void while you are at rehab. Please keep the area clean or monitor for signs of infection. Please follow up with Dr. Arthur in 2 weeks for further evaluation.     PRINCIPAL DISCHARGE DIAGNOSIS  Diagnosis: Lumbar spinal stenosis  Assessment and Plan of Treatment: Lumbar spinal stenosis is the narrowing of the spinal canal in the lower back, often causing pain, numbness, and weakness in the legs. Treatment options include physical therapy, medications, and possibly surgery, depending on severity. Maintaining a healthy weight and regular low-impact exercise can help manage symptoms.  Please follow up with your orthopedic doctor Dr. Arthur on 10/22 @1:30 pm.  We have also provided the number of a Primary Care Doctor that you can call to schedule an appointment.      SECONDARY DISCHARGE DIAGNOSES  Diagnosis: BPH (benign prostatic hyperplasia)  Assessment and Plan of Treatment: Continue your home medication    Diagnosis: DM2 (diabetes mellitus, type 2)  Assessment and Plan of Treatment: Continue your home medication.    Diagnosis: Gait instability  Assessment and Plan of Treatment: See above recommendation for lumbar spinal stenosis.    Diagnosis: Diarrhea  Assessment and Plan of Treatment: Non-infectious diarrhea can be caused by factors like stress, certain medications, or food intolerances, leading to frequent loose stools. Treatment often involves dietary changes, hydration, and managing underlying conditions. Stool incontinence, or the inability to control bowel movements, can result from muscle weakness or nerve damage; pelvic floor exercises and medications may help improve control.  Please follow up with your Orthopedic doctor Dr. Arthur 10/22 for further management. In your case we have ruled out infectious causes of diarrhea.    Diagnosis: Urinary incontinence  Assessment and Plan of Treatment: Urinary incontinence is the involuntary loss of urine, which can result from factors like age, pregnancy, or certain medical conditions. Treatment options include lifestyle changes, pelvic floor exercises, medications, and, in some cases, surgical interventions. Keeping a bladder diary and discussing symptoms with a healthcare provider can help identify the best management strategies.  A Felder catheter is a thin tube inserted into the bladder to drain urine, often used for patients who cannot urinate independently. Proper Felder care involves maintaining hygiene, ensuring the catheter is securely placed, and monitoring for signs of infection or blockage. Regularly checking the drainage system and emptying the bag when it's full are essential for preventing complications.  In your case, we are unsure what is causing your incontinence but it may be due to your abdomen being distended. Please keep your felder in with plans to do more trial of void while you are at rehab. Please keep the area clean or monitor for signs of infection. Please follow up with Dr. Arthur in 2 weeks for further evaluation.

## 2024-10-11 NOTE — ED PROVIDER NOTE - PHYSICAL EXAMINATION
CONSTITUTIONAL: Non-toxic; in no apparent distress  HEAD: Normocephalic; atraumatic  EYES: PERRL; EOM intact   ENMT: External appears normal  NECK: Supple; non-tender  CARD: Normal S1, S2; no murmurs, rubs, or gallops  RESP: Normal chest excursion with respiration; breath sounds clear and equal bilaterally  ABD: Soft, non-distended; non-tender  EXT: Normal ROM in all four extremities; Strength of BLE 3/5, non-tender to palpation, no peripheral edema, no erythema, WWP, cap refill <2s, DP and PT pulses 2+ BL   SKIN: Warm, dry, no rash  NEURO: CN 2-12 intact BL, no dysmetria, no pronator drift, gait unsteady, normal cognition

## 2024-10-11 NOTE — DIETITIAN NUTRITION RISK NOTIFICATION - TREATMENT: THE FOLLOWING DIET HAS BEEN RECOMMENDED
Diet, DASH/TLC:   Sodium & Cholesterol Restricted  Consistent Carbohydrate {No Snacks} (CSTCHO) (10-11-24 @ 03:50) [Active]

## 2024-10-11 NOTE — DISCHARGE NOTE PROVIDER - CARE PROVIDER_API CALL
Valentin Peck [facility]  [address], [place] [address]  Phone: [phone]  Fax: [phone]  Follow Up Time: 2 weeks

## 2024-10-12 DIAGNOSIS — R33.9 RETENTION OF URINE, UNSPECIFIED: ICD-10-CM

## 2024-10-12 LAB
ALBUMIN SERPL ELPH-MCNC: 3.3 G/DL — SIGNIFICANT CHANGE UP (ref 3.3–5)
ALP SERPL-CCNC: 46 U/L — SIGNIFICANT CHANGE UP (ref 40–120)
ALT FLD-CCNC: 43 U/L — SIGNIFICANT CHANGE UP (ref 10–45)
ANION GAP SERPL CALC-SCNC: 12 MMOL/L — SIGNIFICANT CHANGE UP (ref 5–17)
AST SERPL-CCNC: SIGNIFICANT CHANGE UP (ref 10–40)
BASOPHILS # BLD AUTO: 0.04 K/UL — SIGNIFICANT CHANGE UP (ref 0–0.2)
BASOPHILS NFR BLD AUTO: 0.5 % — SIGNIFICANT CHANGE UP (ref 0–2)
BILIRUB SERPL-MCNC: 0.6 MG/DL — SIGNIFICANT CHANGE UP (ref 0.2–1.2)
BUN SERPL-MCNC: 11 MG/DL — SIGNIFICANT CHANGE UP (ref 7–23)
CALCIUM SERPL-MCNC: 8.8 MG/DL — SIGNIFICANT CHANGE UP (ref 8.4–10.5)
CHLORIDE SERPL-SCNC: 101 MMOL/L — SIGNIFICANT CHANGE UP (ref 96–108)
CO2 SERPL-SCNC: 21 MMOL/L — LOW (ref 22–31)
CREAT SERPL-MCNC: 0.82 MG/DL — SIGNIFICANT CHANGE UP (ref 0.5–1.3)
EGFR: 90 ML/MIN/1.73M2 — SIGNIFICANT CHANGE UP
EOSINOPHIL # BLD AUTO: 0.21 K/UL — SIGNIFICANT CHANGE UP (ref 0–0.5)
EOSINOPHIL NFR BLD AUTO: 2.8 % — SIGNIFICANT CHANGE UP (ref 0–6)
GLUCOSE BLDC GLUCOMTR-MCNC: 114 MG/DL — HIGH (ref 70–99)
GLUCOSE BLDC GLUCOMTR-MCNC: 124 MG/DL — HIGH (ref 70–99)
GLUCOSE BLDC GLUCOMTR-MCNC: 89 MG/DL — SIGNIFICANT CHANGE UP (ref 70–99)
GLUCOSE BLDC GLUCOMTR-MCNC: 90 MG/DL — SIGNIFICANT CHANGE UP (ref 70–99)
GLUCOSE SERPL-MCNC: 70 MG/DL — SIGNIFICANT CHANGE UP (ref 70–99)
HCT VFR BLD CALC: 44.5 % — SIGNIFICANT CHANGE UP (ref 39–50)
HGB BLD-MCNC: 13.9 G/DL — SIGNIFICANT CHANGE UP (ref 13–17)
IMM GRANULOCYTES NFR BLD AUTO: 0.4 % — SIGNIFICANT CHANGE UP (ref 0–0.9)
LYMPHOCYTES # BLD AUTO: 1.73 K/UL — SIGNIFICANT CHANGE UP (ref 1–3.3)
LYMPHOCYTES # BLD AUTO: 22.7 % — SIGNIFICANT CHANGE UP (ref 13–44)
MAGNESIUM SERPL-MCNC: 1.8 MG/DL — SIGNIFICANT CHANGE UP (ref 1.6–2.6)
MCHC RBC-ENTMCNC: 29 PG — SIGNIFICANT CHANGE UP (ref 27–34)
MCHC RBC-ENTMCNC: 31.2 GM/DL — LOW (ref 32–36)
MCV RBC AUTO: 92.9 FL — SIGNIFICANT CHANGE UP (ref 80–100)
MONOCYTES # BLD AUTO: 0.72 K/UL — SIGNIFICANT CHANGE UP (ref 0–0.9)
MONOCYTES NFR BLD AUTO: 9.5 % — SIGNIFICANT CHANGE UP (ref 2–14)
NEUTROPHILS # BLD AUTO: 4.88 K/UL — SIGNIFICANT CHANGE UP (ref 1.8–7.4)
NEUTROPHILS NFR BLD AUTO: 64.1 % — SIGNIFICANT CHANGE UP (ref 43–77)
NRBC # BLD: 0 /100 WBCS — SIGNIFICANT CHANGE UP (ref 0–0)
PHOSPHATE SERPL-MCNC: 3.4 MG/DL — SIGNIFICANT CHANGE UP (ref 2.5–4.5)
PLATELET # BLD AUTO: 145 K/UL — LOW (ref 150–400)
POTASSIUM SERPL-MCNC: 4.2 MMOL/L — SIGNIFICANT CHANGE UP (ref 3.5–5.3)
POTASSIUM SERPL-SCNC: 4.2 MMOL/L — SIGNIFICANT CHANGE UP (ref 3.5–5.3)
PROT SERPL-MCNC: 6.6 G/DL — SIGNIFICANT CHANGE UP (ref 6–8.3)
RBC # BLD: 4.79 M/UL — SIGNIFICANT CHANGE UP (ref 4.2–5.8)
RBC # FLD: 13.6 % — SIGNIFICANT CHANGE UP (ref 10.3–14.5)
SODIUM SERPL-SCNC: 134 MMOL/L — LOW (ref 135–145)
WBC # BLD: 7.61 K/UL — SIGNIFICANT CHANGE UP (ref 3.8–10.5)
WBC # FLD AUTO: 7.61 K/UL — SIGNIFICANT CHANGE UP (ref 3.8–10.5)

## 2024-10-12 PROCEDURE — 99232 SBSQ HOSP IP/OBS MODERATE 35: CPT | Mod: GC

## 2024-10-12 RX ORDER — MAGNESIUM SULFATE 500 MG/ML
2 VIAL (ML) INJECTION ONCE
Refills: 0 | Status: COMPLETED | OUTPATIENT
Start: 2024-10-12 | End: 2024-10-12

## 2024-10-12 RX ORDER — ENOXAPARIN SODIUM 150 MG/ML
40 INJECTION SUBCUTANEOUS EVERY 12 HOURS
Refills: 0 | Status: DISCONTINUED | OUTPATIENT
Start: 2024-10-12 | End: 2024-10-16

## 2024-10-12 RX ADMIN — GABAPENTIN 300 MILLIGRAM(S): 800 TABLET, FILM COATED ORAL at 14:22

## 2024-10-12 RX ADMIN — FINASTERIDE 5 MILLIGRAM(S): 5 TABLET, FILM COATED ORAL at 14:21

## 2024-10-12 RX ADMIN — Medication 500 MILLIGRAM(S): at 19:53

## 2024-10-12 RX ADMIN — GABAPENTIN 300 MILLIGRAM(S): 800 TABLET, FILM COATED ORAL at 22:31

## 2024-10-12 RX ADMIN — Medication 500 MILLIGRAM(S): at 06:48

## 2024-10-12 RX ADMIN — GABAPENTIN 300 MILLIGRAM(S): 800 TABLET, FILM COATED ORAL at 06:48

## 2024-10-12 RX ADMIN — ENOXAPARIN SODIUM 40 MILLIGRAM(S): 150 INJECTION SUBCUTANEOUS at 06:48

## 2024-10-12 RX ADMIN — Medication 0.4 MILLIGRAM(S): at 22:31

## 2024-10-12 RX ADMIN — Medication 25 GRAM(S): at 14:22

## 2024-10-12 RX ADMIN — ENOXAPARIN SODIUM 40 MILLIGRAM(S): 150 INJECTION SUBCUTANEOUS at 19:54

## 2024-10-12 RX ADMIN — ZINC SULFATE 220 MILLIGRAM(S): 25 INJECTION, SOLUTION INTRAVENOUS at 14:22

## 2024-10-12 RX ADMIN — Medication 81 MILLIGRAM(S): at 14:21

## 2024-10-12 NOTE — OCCUPATIONAL THERAPY INITIAL EVALUATION ADULT - BALANCE DISTURBANCE, IDENTIFIED IMPAIRMENT CONTRIBUTE, REHAB EVAL
Prescription approved per [facility] Refill Protocol.   impaired coordination/impaired postural control/decreased strength

## 2024-10-12 NOTE — PROGRESS NOTE ADULT - PROBLEM SELECTOR PLAN 8
Home med ASA 81mg qd  - c/w home med Terbinafine Pregnancy And Lactation Text: This medication is Pregnancy Category B and is considered safe during pregnancy. It is also excreted in breast milk and breast feeding isn't recommended.

## 2024-10-12 NOTE — OCCUPATIONAL THERAPY INITIAL EVALUATION ADULT - PERTINENT HX OF CURRENT PROBLEM, REHAB EVAL
78yo M w/ PMHx HTn, CAD, DM2, BPH, & peripheral neuropathy, lumbar spinal stenosis - p/w bilat lower ext weakness & inability to walk which has been worsening since ~1mo ago. Fell the day before presenting to ED which he states was due to weakness. Denies any presyncopal sx, LOC, head strike. Says he's been feeling tired lately but this is the first time he's fallen in a while. Has been feeling dizzy w/ unsteady gait for past few weeks, but that his leg weakness has felt worse over last few days specifically.  Also endorses that the hasn't felt the urge to pee, but notices that he feels bloated, is able to void partially but still feels bladder fullness.

## 2024-10-12 NOTE — OCCUPATIONAL THERAPY INITIAL EVALUATION ADULT - GENERAL OBSERVATIONS, REHAB EVAL
Pt's RN Raz aware of intent to eval/tx; cleared Pt. Pt received in supine w/ his family present - +heplock, bed alarm. Pt agreeable to OT. Rehab aide Cj present.

## 2024-10-12 NOTE — OCCUPATIONAL THERAPY INITIAL EVALUATION ADULT - MD ORDER
S/P Fall  BLE weakness, unsteady gait, w/ difficulty walking  UTI  MR Lumbar spine w/ L4-L5 spinal canal stenosis, L5-S1 nerve root compression, NO cord compression  Pending further w/u

## 2024-10-12 NOTE — PROGRESS NOTE ADULT - PROBLEM SELECTOR PLAN 1
#Bilateral leg weakness  Chronic, seen on prior admission in Sept '24. Likely partially 2/2 chronic BPPV vs other spinal nerve compressions. Was discharged in Sept '24 w/ plan for vestibular rehab - was not able to go because of insurance issues but found a place w/ openings next week.   Weakness also possibly exacerbated iso UTI, & iso known spinal nerve compressions/stenosis.   MR Lumbar spine w/ L4-L5 spinal canal stenosis, L5-S1 nerve root compression, NO cord compression   Plan:   - PT rec RUBY  - Pt plan to attent vestibular rehab post-discharge  - Ortho consulted, to see the patient again today; rec outpatient MRI

## 2024-10-12 NOTE — PROGRESS NOTE ADULT - SUBJECTIVE AND OBJECTIVE BOX
OVERNIGHT EVENTS: CHRISTINE     SUBJECTIVE: Patient seen and examined at bedside, resting comfortably in bed, and does not appear to be in any acute distress. Patient has no acute complaints. Family at bedside report that patient has been declining over the last few months to years. Denies SOB, CP. ROS is otherwise negative.       Vital Signs Last 24 Hrs  T(C): 37.3 (12 Oct 2024 09:48), Max: 37.4 (11 Oct 2024 17:09)  T(F): 99.1 (12 Oct 2024 09:48), Max: 99.3 (11 Oct 2024 17:09)  HR: 66 (12 Oct 2024 09:48) (56 - 83)  BP: 120/75 (12 Oct 2024 09:48) (117/74 - 130/79)  BP(mean): --  RR: 16 (12 Oct 2024 09:48) (16 - 18)  SpO2: 93% (12 Oct 2024 09:48) (93% - 96%)    Parameters below as of 12 Oct 2024 09:48  Patient On (Oxygen Delivery Method): room air            PHYSICAL EXAM:  General: in no acute distress  Eyes: EOMI intact bilaterally. Anicteric sclerae, moist conjunctivae  HENT: Moist mucous membranes  Neck: Trachea midline, supple  Lungs: CTA B/L. No wheezes, rales, or rhonchi  Cardiovascular: RRR. No murmurs, rubs, or gallops  Abdomen: Soft, non-tender non-distended; No rebound or guarding  Extremities: WWP, b/l LE edema  Neurological: Alert and oriented, 5/5 strength throughout  Skin: Warm and dry. No obvious rash   Psych: normal affect    LABS:                          13.9   7.61  )-----------( 145      ( 12 Oct 2024 07:41 )             44.5   10-12    134[L]  |  101  |  11  ----------------------------<  70  4.2   |  21[L]  |  0.82    Ca    8.8      12 Oct 2024 07:41  Phos  3.4     10-12  Mg     1.8     10-12    TPro  6.6  /  Alb  3.3  /  TBili  0.6  /  DBili  x   /  AST  See Note  /  ALT  43  /  AlkPhos  46  10-12

## 2024-10-12 NOTE — PROGRESS NOTE ADULT - NSPROGADDITIONALINFOA_GEN_ALL_CORE
-Orthostatics  -Peripheral neuropathy appears chronic, c/w Gabapentin  -B/l LE edema - consider dopplers

## 2024-10-12 NOTE — OCCUPATIONAL THERAPY INITIAL EVALUATION ADULT - ADDITIONAL COMMENTS
Pt states that prior to admission, he lived with his wife in an apartment with an elevator to arrive to unit. Patient was independent in ADLs and utilized a rollator for ambulation. Patient states that he has a cane, shower chair and grab bars.

## 2024-10-13 LAB
ALBUMIN SERPL ELPH-MCNC: 3.5 G/DL — SIGNIFICANT CHANGE UP (ref 3.3–5)
ALP SERPL-CCNC: 49 U/L — SIGNIFICANT CHANGE UP (ref 40–120)
ALT FLD-CCNC: 36 U/L — SIGNIFICANT CHANGE UP (ref 10–45)
ANION GAP SERPL CALC-SCNC: 10 MMOL/L — SIGNIFICANT CHANGE UP (ref 5–17)
AST SERPL-CCNC: 49 U/L — HIGH (ref 10–40)
BILIRUB SERPL-MCNC: 0.5 MG/DL — SIGNIFICANT CHANGE UP (ref 0.2–1.2)
BUN SERPL-MCNC: 10 MG/DL — SIGNIFICANT CHANGE UP (ref 7–23)
CALCIUM SERPL-MCNC: 8.8 MG/DL — SIGNIFICANT CHANGE UP (ref 8.4–10.5)
CHLORIDE SERPL-SCNC: 103 MMOL/L — SIGNIFICANT CHANGE UP (ref 96–108)
CO2 SERPL-SCNC: 24 MMOL/L — SIGNIFICANT CHANGE UP (ref 22–31)
CREAT SERPL-MCNC: 0.78 MG/DL — SIGNIFICANT CHANGE UP (ref 0.5–1.3)
CULTURE RESULTS: ABNORMAL
EGFR: 92 ML/MIN/1.73M2 — SIGNIFICANT CHANGE UP
GLUCOSE BLDC GLUCOMTR-MCNC: 112 MG/DL — HIGH (ref 70–99)
GLUCOSE BLDC GLUCOMTR-MCNC: 125 MG/DL — HIGH (ref 70–99)
GLUCOSE BLDC GLUCOMTR-MCNC: 137 MG/DL — HIGH (ref 70–99)
GLUCOSE BLDC GLUCOMTR-MCNC: 147 MG/DL — HIGH (ref 70–99)
GLUCOSE SERPL-MCNC: 128 MG/DL — HIGH (ref 70–99)
POTASSIUM SERPL-MCNC: 3.6 MMOL/L — SIGNIFICANT CHANGE UP (ref 3.5–5.3)
POTASSIUM SERPL-SCNC: 3.6 MMOL/L — SIGNIFICANT CHANGE UP (ref 3.5–5.3)
PROT SERPL-MCNC: 6.6 G/DL — SIGNIFICANT CHANGE UP (ref 6–8.3)
SODIUM SERPL-SCNC: 137 MMOL/L — SIGNIFICANT CHANGE UP (ref 135–145)
SPECIMEN SOURCE: SIGNIFICANT CHANGE UP

## 2024-10-13 PROCEDURE — 99232 SBSQ HOSP IP/OBS MODERATE 35: CPT | Mod: GC

## 2024-10-13 RX ORDER — SENNOSIDES 8.6 MG
2 TABLET ORAL AT BEDTIME
Refills: 0 | Status: DISCONTINUED | OUTPATIENT
Start: 2024-10-13 | End: 2024-10-13

## 2024-10-13 RX ADMIN — Medication 0.4 MILLIGRAM(S): at 23:04

## 2024-10-13 RX ADMIN — ENOXAPARIN SODIUM 40 MILLIGRAM(S): 150 INJECTION SUBCUTANEOUS at 18:32

## 2024-10-13 RX ADMIN — Medication 500 MILLIGRAM(S): at 18:32

## 2024-10-13 RX ADMIN — Medication 81 MILLIGRAM(S): at 11:21

## 2024-10-13 RX ADMIN — ENOXAPARIN SODIUM 40 MILLIGRAM(S): 150 INJECTION SUBCUTANEOUS at 06:56

## 2024-10-13 RX ADMIN — Medication 500 MILLIGRAM(S): at 06:56

## 2024-10-13 RX ADMIN — GABAPENTIN 300 MILLIGRAM(S): 800 TABLET, FILM COATED ORAL at 06:56

## 2024-10-13 RX ADMIN — GABAPENTIN 300 MILLIGRAM(S): 800 TABLET, FILM COATED ORAL at 23:04

## 2024-10-13 RX ADMIN — GABAPENTIN 300 MILLIGRAM(S): 800 TABLET, FILM COATED ORAL at 13:48

## 2024-10-13 RX ADMIN — ZINC SULFATE 220 MILLIGRAM(S): 25 INJECTION, SOLUTION INTRAVENOUS at 11:21

## 2024-10-13 RX ADMIN — FINASTERIDE 5 MILLIGRAM(S): 5 TABLET, FILM COATED ORAL at 11:21

## 2024-10-13 NOTE — PROGRESS NOTE ADULT - SUBJECTIVE AND OBJECTIVE BOX
Physical Medicine and Rehabilitation Progress Note :       Patient is a 79y old  Male who presents with a chief complaint of UTI, leg weakness (13 Oct 2024 05:42)      HPI:  78yo M w/ PMHx HTn, CAD, DM2, BPH, & peripheral neuropathy, lumbar spinal stenosis - p/w bilat lower ext weakness & inability to walk which has been worsening since ~1mo ago. Fell the day before presenting to ED which he states was due to weakness. Denies any presyncopal sx, LOC, head strike. Says he's been feeling tired lately but this is the first time he's fell in a while. Has been feeling dizzy w/ unsteady gait for past few weeks, but that his leg weakness has felt worse over last few days specifically.  Also endorses that the hasn't felt the urge to pee, but notices that he feels bloated, is able to void partially but still feels bladder fullness.  No dysuria or hematuria that he's noticed. Denies any recent fever, chills, flank pain, bowel incontinence, or urinary incontinence.     Vitals: HR 97.8F, HR 66, BP 90s-100s/60s. RR 18 sat 98% RA  Labs: CBC wnl, BMP unrevealing (BUN/Cr 14/1.17). UA w/LE, ++WBC, many bacteria. Limited RVP neg.   CT Head non con: No acute intracranial hemorrhage  MR Lumbar spine: No acute fracture. +L5-S1 disc protrusion w/ compression on anterior thecal sac, spinal canal stenosis centrally. Extension of disc protrusion/herniation to left side w/ compromise of L5-S1 foramen, likely exiting nerve root compression. Moderate L4-L5 annular bulge L4-L5 w/ marked facet hypertrophy causing central & bilateral foraminal stenosis.   Consults: Ortho   (11 Oct 2024 03:51)                            13.9   7.61  )-----------( 145      ( 12 Oct 2024 07:41 )             44.5       10-13    137  |  103  |  10  ----------------------------<  128[H]  3.6   |  24  |  0.78    Ca    8.8      13 Oct 2024 05:30  Phos  3.4     10-12  Mg     1.8     10-12    TPro  6.6  /  Alb  3.5  /  TBili  0.5  /  DBili  x   /  AST  49[H]  /  ALT  36  /  AlkPhos  49  10-13    Vital Signs Last 24 Hrs  T(C): 36.8 (13 Oct 2024 05:27), Max: 37.3 (12 Oct 2024 09:48)  T(F): 98.3 (13 Oct 2024 05:27), Max: 99.1 (12 Oct 2024 09:48)  HR: 81 (13 Oct 2024 05:27) (66 - 83)  BP: 127/74 (13 Oct 2024 05:27) (120/75 - 135/77)  BP(mean): --  RR: 18 (13 Oct 2024 05:27) (16 - 18)  SpO2: 96% (13 Oct 2024 05:27) (93% - 96%)    Parameters below as of 13 Oct 2024 05:27  Patient On (Oxygen Delivery Method): room air        MEDICATIONS  (STANDING):  ascorbic acid 500 milliGRAM(s) Oral two times a day  aspirin enteric coated 81 milliGRAM(s) Oral daily  dextrose 5%. 1000 milliLiter(s) (50 mL/Hr) IV Continuous <Continuous>  dextrose 5%. 1000 milliLiter(s) (100 mL/Hr) IV Continuous <Continuous>  dextrose 50% Injectable 25 Gram(s) IV Push once  dextrose 50% Injectable 12.5 Gram(s) IV Push once  dextrose 50% Injectable 25 Gram(s) IV Push once  enoxaparin Injectable 40 milliGRAM(s) SubCutaneous every 12 hours  finasteride 5 milliGRAM(s) Oral daily  gabapentin 300 milliGRAM(s) Oral every 8 hours  glucagon  Injectable 1 milliGRAM(s) IntraMuscular once  insulin lispro (ADMELOG) corrective regimen sliding scale   SubCutaneous Before meals and at bedtime  tamsulosin 0.4 milliGRAM(s) Oral at bedtime  zinc sulfate 220 milliGRAM(s) Oral daily    MEDICATIONS  (PRN):  dextrose Oral Gel 15 Gram(s) Oral once PRN Blood Glucose LESS THAN 70 milliGRAM(s)/deciliter      T(C): 36.8 (10-13-24 @ 05:27), Max: 37.3 (10-12-24 @ 09:48)  HR: 81 (10-13-24 @ 05:27) (66 - 83)  BP: 127/74 (10-13-24 @ 05:27) (120/75 - 135/77)  RR: 18 (10-13-24 @ 05:27) (16 - 18)  SpO2: 96% (10-13-24 @ 05:27) (93% - 96%)    Physical Exam:   79 y o man lying comfortably in semi Rock's position , awake , alert , no acute complaints     Head: normocephalic , atraumatic    Eyes: PERRLA , EOMI , no nystagmus , sclera anicteric    ENT / FACE: neg nasal discharge , uvula midline , no oropharyngeal erythema / exudate    Neck: supple , negative JVD , negative carotid bruits , no thyromegaly    Chest: CTA bilaterally , neg wheeze / rhonchi / rales / crackles / egophany    Cardiovascular: regular rate and rhythm , neg murmurs / rubs / gallops    Abdomen: soft , non distended , no tenderness to palpation in all 4 quadrants ,  normal bowel sounds     Lower Extremities: WWP , neg cyanosis /clubbing / edema      Neurologic Exam:     Alert and oriented  x 3    Motor Exam:        > 3+/5 x 4 extremities      Sensation:         intact to light touch x 4 extremities     DTR:           biceps/brachioradialis: equal                            patella/ankle: equal          10/12/2024  Functional Status Assessment :       Previous Level of Function:      · Bed Mobility/Transfers	independent  · Bathing	independent  · Upper Body Dressing	independent  · Lower Body Dressing	independent  · Grooming	independent  · Toileting	independent  · Eating	independent  · Home Management Skills	needed assist  · Additional Comments	Pt states that prior to admission, he lived with his wife in an apartment with an elevator to arrive to unit. Patient was independent in ADLs and utilized a rollator for ambulation. Patient states that he has a cane, shower chair and grab bars.      Cognitive Status Examination:   · Level of Consciousness	lethargic/somnolent  · Orientation	person; place; situation  · Follow Commands/Answers Questions	100% of the time  · Personal Safety and Judgment	Requires cues to increase safety awareness w/ functional activities  · Short Term Memory	Continue to assess  · Long Term Memory	intact    Range of Motion Exam:   · Range of Motion Examination, Upper Extremity	bilateral UE Passive ROM was WFL  (within functional limits); bilateral UE Active ROM was WFL  (within functional limits)  · Range of Motion Examination, Lower Extremity	bilateral LE Passive ROM was WFL  (within functional limits); bilateral LE Active ROM was WFL  (within functional limits)    Manual Muscle Testing:   · Manual Muscle Testing Results	BUEs 5/5; BLEs 4/5    Muscle Tone Assessment:   · Muscle Tone Assessment	normal    Bed Mobility: Rolling/Turning:     · Level of [place]	minimum assist (75% patients effort)  · Physical Assist/Nonphysical Assist	2 person assist; verbal cues    Bed Mobility: Scooting/Bridging:     · Level of [place]	minimum assist (75% patients effort)  · Physical Assist/Nonphysical Assist	1 person assist; verbal cues    Bed Mobility: Sit to Supine:     · Level of [place]	moderate assist (50% patients effort)  · Physical Assist/Nonphysical Assist	2 person assist; verbal cues    Bed Mobility: Supine to Sit:     · Level of [place]	moderate assist (50% patients effort)  · Physical Assist/Nonphysical Assist	2 person assist; verbal cues    Bed Mobility Analysis:     · Impairments Contributing to Impaired Bed Mobility	decreased strength; decreased flexibility; impaired coordination    Transfer: Sit to Stand:     · Level of [place]	moderate assist (50% patients effort)  · Physical Assist/Nonphysical Assist	2 person assist; verbal cues  · Assistive Device	rolling walker    Transfer: Stand to Sit:     · Level of [place]	moderate assist (50% patients effort)  · Physical Assist/Nonphysical Assist	2 person assist; verbal cues    Sit/Stand Transfer Safety Analysis:     · Transfer Safety Concerns Noted	decreased sequencing ability; decreased safety awareness  · Impairments Contributing to Impaired Transfers	impaired balance; impaired coordination; decreased flexibility; decreased strength; impaired postural control    Balance Skills Assessment:     · Sitting Balance: Static	fair plus  · Sitting Balance: Dynamic	fair balance  · Sit-to-Stand Balance	poor plus  · Standing Balance: Static	poor plus  · Standing Balance: Dynamic	poor balance  · Systems Impairment Contributing to Balance Disturbance	neuromuscular  · Identified Impairments Contributing to Balance Disturbance	decreased strength; impaired coordination; impaired postural control    Sensory Examination:     Grossly Intact:   · Gross Sensory Examination	Reports h/o of neuropathy to b/l feet and fingertips    · Balance Skills	Pt took ~6 side steps EOB w/ Max A x2, RW      Fine Motor Coordination:     Grossly Intact:   · Grossly Intact	Left UE; Right UE      Fine Motor Coordination:   · Left Hand, Manipulation of Objects	mild impairment  · Right Hand, Manipulation of Objects	mild impairment    Upper Body Dressing Training:     · Level of [place]	maximum assist (25% patients effort)  · Physical Assist/Nonphysical Assist	1 person assist; verbal cues    Lower Body Dressing Training:     · Level of [place]	dependent (less than 25% patients effort)    Toilet Hygiene Training:     · Level of [place]	dependent (less than 25% patients effort)    Grooming Training:     · Level of [place]	moderate assist (50% patients effort)            PM&R Impression : as above    Current disposition plan recommendation :    subacute rehab placement

## 2024-10-13 NOTE — PROGRESS NOTE ADULT - SUBJECTIVE AND OBJECTIVE BOX
*****INCOMPLETE NOTE*****    INTERVAL HPI/OVERNIGHT EVENTS:    SUBJECTIVE: Patient seen and examined at bedside, resting comfortably in bed, and does not appear to be in any acute distress. Patient reports    Vital Signs Last 24 Hrs  T(C): 36.8 (13 Oct 2024 05:27), Max: 37.3 (12 Oct 2024 09:48)  T(F): 98.3 (13 Oct 2024 05:27), Max: 99.1 (12 Oct 2024 09:48)  HR: 81 (13 Oct 2024 05:27) (56 - 83)  BP: 127/74 (13 Oct 2024 05:27) (120/75 - 135/77)  BP(mean): --  RR: 18 (13 Oct 2024 05:27) (16 - 18)  SpO2: 96% (13 Oct 2024 05:27) (93% - 96%)    Parameters below as of 13 Oct 2024 05:27  Patient On (Oxygen Delivery Method): room air        PHYSICAL EXAM:  General: in no acute distress  Eyes: EOMI intact bilaterally. Anicteric sclerae, moist conjunctivae  HENT: Moist mucous membranes  Neck: Trachea midline, supple  Lungs: CTA B/L. No wheezes, rales, or rhonchi  Cardiovascular: RRR. No murmurs, rubs, or gallops  Abdomen: Soft, non-tender non-distended; No rebound or guarding  Extremities: WWP, No clubbing, cyanosis or edema  Neurological: Alert and oriented  Skin: Warm and dry. No obvious rash     LABS:                        13.9   7.61  )-----------( 145      ( 12 Oct 2024 07:41 )             44.5     10-12    134[L]  |  101  |  11  ----------------------------<  70  4.2   |  21[L]  |  0.82    Ca    8.8      12 Oct 2024 07:41  Phos  3.4     10-12  Mg     1.8     10-12    TPro  6.6  /  Alb  3.3  /  TBili  0.6  /  DBili  x   /  AST  See Note  /  ALT  43  /  AlkPhos  46  10-12   INTERVAL HPI/OVERNIGHT EVENTS: CHRISTINE     SUBJECTIVE: Patient seen and examined at bedside, resting comfortably in bed, and does not appear to be in any acute distress. Per nursing, patient had several liquid bowel movements overnight. Nursing also reports patient became agitated overnight thinking he is at home or driving a train. Patient states he feels well and wants to go home.     Vital Signs Last 24 Hrs  T(C): 36.8 (13 Oct 2024 05:27), Max: 37.3 (12 Oct 2024 09:48)  T(F): 98.3 (13 Oct 2024 05:27), Max: 99.1 (12 Oct 2024 09:48)  HR: 81 (13 Oct 2024 05:27) (56 - 83)  BP: 127/74 (13 Oct 2024 05:27) (120/75 - 135/77)  BP(mean): --  RR: 18 (13 Oct 2024 05:27) (16 - 18)  SpO2: 96% (13 Oct 2024 05:27) (93% - 96%)    Parameters below as of 13 Oct 2024 05:27  Patient On (Oxygen Delivery Method): room air      PHYSICAL EXAM:  General: in no acute distress. Obese.   Eyes: EOMI intact bilaterally. Anicteric sclerae, moist conjunctivae  HENT: Moist mucous membranes  Neck: Trachea midline, supple  Lungs: CTA B/L. No wheezes, rales, or rhonchi  Cardiovascular: RRR. No murmurs, rubs, or gallops  Abdomen: Soft, non-tender non-distended; No rebound or guarding  Extremities: WWP, b/l LE edema  Neurological: Alert and oriented x2-3. 5/5 strength throughout.   Skin: Warm and dry. No obvious rash   Psych: normal affect    LABS:                        13.9   7.61  )-----------( 145      ( 12 Oct 2024 07:41 )             44.5     10-12    134[L]  |  101  |  11  ----------------------------<  70  4.2   |  21[L]  |  0.82    Ca    8.8      12 Oct 2024 07:41  Phos  3.4     10-12  Mg     1.8     10-12    TPro  6.6  /  Alb  3.3  /  TBili  0.6  /  DBili  x   /  AST  See Note  /  ALT  43  /  AlkPhos  46  10-12

## 2024-10-13 NOTE — PROGRESS NOTE ADULT - PROBLEM SELECTOR PLAN 1
#Bilateral leg weakness  Chronic, seen on prior admission in Sept '24. Likely partially 2/2 chronic BPPV vs other spinal nerve compressions. Was discharged in Sept '24 w/ plan for vestibular rehab - was not able to go because of insurance issues but found a place w/ openings next week.   Weakness also possibly exacerbated iso UTI, & iso known spinal nerve compressions/stenosis.   MR Lumbar spine w/ L4-L5 spinal canal stenosis, L5-S1 nerve root compression, NO cord compression     Plan:   - PT rec RUBY  - Pt plan to attend vestibular rehab post-discharge  - Ortho consulted, to see the patient again today; rec outpatient MRI #Bilateral leg weakness  Chronic, seen on prior admission in Sept '24. Likely partially 2/2 chronic BPPV vs other spinal nerve compressions. Was discharged in Sept '24 w/ plan for vestibular rehab - was not able to go because of insurance issues but found a place w/ openings next week.   Weakness also possibly exacerbated iso UTI, & iso known spinal nerve compressions/stenosis.   MR Lumbar spine w/ L4-L5 spinal canal stenosis, L5-S1 nerve root compression, NO cord compression     Plan:   - PT rec RUBY  - Pt plan to attend vestibular rehab post-discharge  - Ortho consulted, #Bilateral leg weakness  Chronic, seen on prior admission in Sept '24. Likely partially 2/2 chronic BPPV vs other spinal nerve compressions. Was discharged in Sept '24 w/ plan for vestibular rehab - was not able to go because of insurance issues but found a place w/ openings next week.   Weakness also possibly exacerbated iso UTI, & iso known spinal nerve compressions/stenosis.   MR Lumbar spine w/ L4-L5 spinal canal stenosis, L5-S1 nerve root compression, NO cord compression     Plan:   - PT rec RUBY  - Pt plan to attend vestibular rehab post-discharge  - Ortho consulted, recommend OP orthopedic follow up

## 2024-10-13 NOTE — PROGRESS NOTE ADULT - PROBLEM SELECTOR PLAN 2
Patient failed ___ TOVs.     Plan:  - Patient failed TOVs. Per nursing, patient has been incontinent throughout the evening of 10/12- 10/13 with bladder scans throughout the evening with very little urine or 0mL.     Plan:  - felder ?? Patient failed TOVs. Per nursing, patient has been incontinent throughout the evening of 10/12- 10/13 with bladder scans throughout the evening with very little urine or 0mL.     Plan:  - TOVs   - repeat bladder scans, felder if he fails 3x  - f/u urine cultures

## 2024-10-13 NOTE — PROGRESS NOTE ADULT - PROBLEM SELECTOR PLAN 4
Presented w/ urinary retention. UA w/LE, ++WBC, many bacteria. UCx from prior admission 9/5 w/ >100K cfu Staph Epi, s/p course IV CTX. s/p CTX 1g in ED 10/11 AM.  NO dysuria, increased frequency or infectious symptoms.      Home meds: Tamsulosin 0.4mg qd, Finasteride 5mg qd    Plan  - Despite + UA, will monitor off antibiotics given patient does not have symptoms of infection . retention likely 2/2 to h/o BPH   - Segura placed for urinary retention  - c/w home meds Presented w/ urinary retention. UA w/LE, ++WBC, many bacteria. UCx from prior admission 9/5 w/ >100K cfu Staph Epi, s/p course IV CTX. s/p CTX 1g in ED 10/11 AM.  NO dysuria, increased frequency or infectious symptoms.      Home meds: Tamsulosin 0.4mg qd, Finasteride 5mg qd    Plan  - Despite + UA, will monitor off antibiotics given patient does not have symptoms of infection . retention likely 2/2 to h/o BPH   - c/w home meds  - felder removed for TOVs, ? will replace Presented w/ urinary retention. UA w/LE, ++WBC, many bacteria. UCx from prior admission 9/5 w/ >100K cfu Staph Epi, s/p course IV CTX. s/p CTX 1g in ED 10/11 AM.  NO dysuria, increased frequency or infectious symptoms.      Home meds: Tamsulosin 0.4mg qd, Finasteride 5mg qd    Plan  - Despite + UA, will monitor off antibiotics given patient does not have symptoms of infection . retention likely 2/2 to h/o BPH   - c/w home meds  - felder removed for TOVs, replace PRN

## 2024-10-14 LAB
GI PCR PANEL: SIGNIFICANT CHANGE UP
GLUCOSE BLDC GLUCOMTR-MCNC: 109 MG/DL — HIGH (ref 70–99)
GLUCOSE BLDC GLUCOMTR-MCNC: 119 MG/DL — HIGH (ref 70–99)
GLUCOSE BLDC GLUCOMTR-MCNC: 144 MG/DL — HIGH (ref 70–99)
GLUCOSE BLDC GLUCOMTR-MCNC: 154 MG/DL — HIGH (ref 70–99)

## 2024-10-14 PROCEDURE — 74018 RADEX ABDOMEN 1 VIEW: CPT | Mod: 26

## 2024-10-14 PROCEDURE — 99233 SBSQ HOSP IP/OBS HIGH 50: CPT

## 2024-10-14 RX ADMIN — Medication 500 MILLIGRAM(S): at 18:40

## 2024-10-14 RX ADMIN — Medication 0.4 MILLIGRAM(S): at 23:31

## 2024-10-14 RX ADMIN — FINASTERIDE 5 MILLIGRAM(S): 5 TABLET, FILM COATED ORAL at 11:47

## 2024-10-14 RX ADMIN — GABAPENTIN 300 MILLIGRAM(S): 800 TABLET, FILM COATED ORAL at 23:30

## 2024-10-14 RX ADMIN — ENOXAPARIN SODIUM 40 MILLIGRAM(S): 150 INJECTION SUBCUTANEOUS at 18:40

## 2024-10-14 RX ADMIN — GABAPENTIN 300 MILLIGRAM(S): 800 TABLET, FILM COATED ORAL at 06:36

## 2024-10-14 RX ADMIN — Medication 2: at 10:00

## 2024-10-14 RX ADMIN — Medication 500 MILLIGRAM(S): at 06:36

## 2024-10-14 RX ADMIN — Medication 81 MILLIGRAM(S): at 11:46

## 2024-10-14 RX ADMIN — GABAPENTIN 300 MILLIGRAM(S): 800 TABLET, FILM COATED ORAL at 13:59

## 2024-10-14 RX ADMIN — ZINC SULFATE 220 MILLIGRAM(S): 25 INJECTION, SOLUTION INTRAVENOUS at 11:47

## 2024-10-14 RX ADMIN — ENOXAPARIN SODIUM 40 MILLIGRAM(S): 150 INJECTION SUBCUTANEOUS at 06:36

## 2024-10-14 NOTE — PROGRESS NOTE ADULT - PROBLEM SELECTOR PLAN 4
Presented w/ urinary retention. UA w/LE, ++WBC, many bacteria. UCx from prior admission 9/5 w/ >100K cfu Staph Epi, s/p course IV CTX. s/p CTX 1g in ED 10/11 AM.  NO dysuria, increased frequency or infectious symptoms.      Home meds: Tamsulosin 0.4mg qd, Finasteride 5mg qd    Plan  - Despite + UA, will monitor off antibiotics given patient does not have symptoms of infection . retention likely 2/2 to h/o BPH   - c/w home meds  - felder removed for TOVs, replace PRN Presented w/ urinary retention. UA w/LE, ++WBC, many bacteria. UCx from prior admission 9/5 w/ >100K cfu Staph Epi, s/p course IV CTX. s/p CTX 1g in ED 10/11 AM.  NO dysuria, increased frequency or infectious symptoms.      Home meds: Tamsulosin 0.4mg qd, Finasteride 5mg qd    Plan  - Despite + UA, will monitor off antibiotics given patient does not have symptoms of infection . retention likely 2/2 to h/o BPH   - c/w home meds  - Failed TOV x3, felder inserted

## 2024-10-14 NOTE — PROGRESS NOTE ADULT - SUBJECTIVE AND OBJECTIVE BOX
Internal Medicine Progress Note  Kerri Moss, PGY-8 [phone]    ******INCOMPLETE******    HOSPITAL COURSE:        OVERNIGHT EVENTS/INTERVAL HPI:        OBJECTIVE:  Vital Signs Last 24 Hrs  T(C): 36.8 (13 Oct 2024 20:55), Max: 37.4 (13 Oct 2024 15:35)  T(F): 98.2 (13 Oct 2024 20:55), Max: 99.3 (13 Oct 2024 15:35)  HR: 91 (13 Oct 2024 20:55) (81 - 94)  BP: 110/76 (13 Oct 2024 20:55) (110/76 - 135/80)  BP(mean): 95 (13 Oct 2024 11:17) (95 - 95)  RR: 17 (13 Oct 2024 20:55) (17 - 18)  SpO2: 92% (13 Oct 2024 20:55) (92% - 96%)    Parameters below as of 13 Oct 2024 20:55  Patient On (Oxygen Delivery Method): room air      I&O's Detail    12 Oct 2024 07:01  -  13 Oct 2024 07:00  --------------------------------------------------------  IN:    IV PiggyBack: 50 mL  Total IN: 50 mL    OUT:    Intermittent Catheterization - Urethral (mL): 900 mL  Total OUT: 900 mL    Total NET: -850 mL      13 Oct 2024 07:01  -  14 Oct 2024 05:15  --------------------------------------------------------  IN:  Total IN: 0 mL    OUT:    Post-Void Residual per Intermittent Catheterization (mL): 500 mL    Voided (mL): 200 mL  Total OUT: 700 mL    Total NET: -700 mL        PHYSICAL EXAM:  General: in no acute distress. Obese.   Eyes: EOMI intact bilaterally. Anicteric sclerae, moist conjunctivae  HENT: Moist mucous membranes  Neck: Trachea midline, supple  Lungs: CTA B/L. No wheezes, rales, or rhonchi  Cardiovascular: RRR. No murmurs, rubs, or gallops  Abdomen: Soft, non-tender non-distended; No rebound or guarding  Extremities: WWP, b/l LE edema  Neurological: Alert and oriented x2-3. 5/5 strength throughout.   Skin: Warm and dry. No obvious rash   Psych: normal affect    Medications:  MEDICATIONS  (STANDING):  ascorbic acid 500 milliGRAM(s) Oral two times a day  aspirin enteric coated 81 milliGRAM(s) Oral daily  dextrose 5%. 1000 milliLiter(s) (50 mL/Hr) IV Continuous <Continuous>  dextrose 5%. 1000 milliLiter(s) (100 mL/Hr) IV Continuous <Continuous>  dextrose 50% Injectable 25 Gram(s) IV Push once  dextrose 50% Injectable 12.5 Gram(s) IV Push once  dextrose 50% Injectable 25 Gram(s) IV Push once  enoxaparin Injectable 40 milliGRAM(s) SubCutaneous every 12 hours  finasteride 5 milliGRAM(s) Oral daily  gabapentin 300 milliGRAM(s) Oral every 8 hours  glucagon  Injectable 1 milliGRAM(s) IntraMuscular once  insulin lispro (ADMELOG) corrective regimen sliding scale   SubCutaneous Before meals and at bedtime  tamsulosin 0.4 milliGRAM(s) Oral at bedtime  zinc sulfate 220 milliGRAM(s) Oral daily    MEDICATIONS  (PRN):  dextrose Oral Gel 15 Gram(s) Oral once PRN Blood Glucose LESS THAN 70 milliGRAM(s)/deciliter      Labs:                        13.9   7.61  )-----------( 145      ( 12 Oct 2024 07:41 )             44.5     10-13    137  |  103  |  10  ----------------------------<  128[H]  3.6   |  24  |  0.78    Ca    8.8      13 Oct 2024 05:30  Phos  3.4     10-12  Mg     1.8     10-12    TPro  6.6  /  Alb  3.5  /  TBili  0.5  /  DBili  x   /  AST  49[H]  /  ALT  36  /  AlkPhos  49  10-13        Urinalysis Basic - ( 13 Oct 2024 05:30 )    Color: x / Appearance: x / SG: x / pH: x  Gluc: 128 mg/dL / Ketone: x  / Bili: x / Urobili: x   Blood: x / Protein: x / Nitrite: x   Leuk Esterase: x / RBC: x / WBC x   Sq Epi: x / Non Sq Epi: x / Bacteria: x          Radiology: Reviewed Internal Medicine Progress Note  Kerri Moss, PGY-4 [phone]    OVERNIGHT EVENTS/INTERVAL HPI:  Overnight, BS with 469 cc and straight cath'd 500cc. 2 AM BS 7 cc. In AM, O2 90-91% RA, asymptomatic. Neg GI PCR. PM BS with 400 cc so felder inserted. ordered KUB c/f constipation.      OBJECTIVE:  Vital Signs Last 24 Hrs  T(C): 36.8 (13 Oct 2024 20:55), Max: 37.4 (13 Oct 2024 15:35)  T(F): 98.2 (13 Oct 2024 20:55), Max: 99.3 (13 Oct 2024 15:35)  HR: 91 (13 Oct 2024 20:55) (81 - 94)  BP: 110/76 (13 Oct 2024 20:55) (110/76 - 135/80)  BP(mean): 95 (13 Oct 2024 11:17) (95 - 95)  RR: 17 (13 Oct 2024 20:55) (17 - 18)  SpO2: 92% (13 Oct 2024 20:55) (92% - 96%)    Parameters below as of 13 Oct 2024 20:55  Patient On (Oxygen Delivery Method): room air      I&O's Detail    12 Oct 2024 07:01  -  13 Oct 2024 07:00  --------------------------------------------------------  IN:    IV PiggyBack: 50 mL  Total IN: 50 mL    OUT:    Intermittent Catheterization - Urethral (mL): 900 mL  Total OUT: 900 mL    Total NET: -850 mL      13 Oct 2024 07:01  -  14 Oct 2024 05:15  --------------------------------------------------------  IN:  Total IN: 0 mL    OUT:    Post-Void Residual per Intermittent Catheterization (mL): 500 mL    Voided (mL): 200 mL  Total OUT: 700 mL    Total NET: -700 mL        PHYSICAL EXAM:  General: in no acute distress. Obese.   Eyes: EOMI intact bilaterally. Anicteric sclerae, moist conjunctivae  HENT: Moist mucous membranes  Neck: Trachea midline, supple  Lungs: CTA B/L. No wheezes, rales, or rhonchi  Cardiovascular: RRR. No murmurs, rubs, or gallops  Abdomen: Soft, non-tender non-distended; No rebound or guarding  Extremities: WWP, b/l LE edema  Neurological: Alert and oriented x2-3. 5/5 strength throughout.   Skin: Warm and dry. No obvious rash   Psych: normal affect    Medications:  MEDICATIONS  (STANDING):  ascorbic acid 500 milliGRAM(s) Oral two times a day  aspirin enteric coated 81 milliGRAM(s) Oral daily  dextrose 5%. 1000 milliLiter(s) (50 mL/Hr) IV Continuous <Continuous>  dextrose 5%. 1000 milliLiter(s) (100 mL/Hr) IV Continuous <Continuous>  dextrose 50% Injectable 25 Gram(s) IV Push once  dextrose 50% Injectable 12.5 Gram(s) IV Push once  dextrose 50% Injectable 25 Gram(s) IV Push once  enoxaparin Injectable 40 milliGRAM(s) SubCutaneous every 12 hours  finasteride 5 milliGRAM(s) Oral daily  gabapentin 300 milliGRAM(s) Oral every 8 hours  glucagon  Injectable 1 milliGRAM(s) IntraMuscular once  insulin lispro (ADMELOG) corrective regimen sliding scale   SubCutaneous Before meals and at bedtime  tamsulosin 0.4 milliGRAM(s) Oral at bedtime  zinc sulfate 220 milliGRAM(s) Oral daily    MEDICATIONS  (PRN):  dextrose Oral Gel 15 Gram(s) Oral once PRN Blood Glucose LESS THAN 70 milliGRAM(s)/deciliter      Labs:                        13.9   7.61  )-----------( 145      ( 12 Oct 2024 07:41 )             44.5     10-13    137  |  103  |  10  ----------------------------<  128[H]  3.6   |  24  |  0.78    Ca    8.8      13 Oct 2024 05:30  Phos  3.4     10-12  Mg     1.8     10-12    TPro  6.6  /  Alb  3.5  /  TBili  0.5  /  DBili  x   /  AST  49[H]  /  ALT  36  /  AlkPhos  49  10-13        Urinalysis Basic - ( 13 Oct 2024 05:30 )    Color: x / Appearance: x / SG: x / pH: x  Gluc: 128 mg/dL / Ketone: x  / Bili: x / Urobili: x   Blood: x / Protein: x / Nitrite: x   Leuk Esterase: x / RBC: x / WBC x   Sq Epi: x / Non Sq Epi: x / Bacteria: x          Radiology: Reviewed

## 2024-10-14 NOTE — PROGRESS NOTE ADULT - PROBLEM SELECTOR PLAN 2
Patient failed TOVs. Per nursing, patient has been incontinent throughout the evening of 10/12- 10/13 with bladder scans throughout the evening with very little urine or 0mL.     Plan:  - TOVs   - repeat bladder scans, felder if he fails 3x  - f/u urine cultures Patient failed TOVs. Per nursing, patient has been incontinent throughout the evening of 10/12- 10/13 with bladder scans throughout the evening with very little urine or 0mL.       Plan:  - TOVs   - repeat bladder scans, felder if he fails 3x  - f/u urine cultures Patient failed TOVs. Per nursing, patient has been incontinent throughout the evening of 10/12- 10/13 with bladder scans throughout the evening with very little urine or 0mL.       Plan:  - Failed TOV x3, felder inserted  - 10/10 urine culture with coag neg staph and actiomyces neuii 50-99k  - f/u KUB to evaluate for overflow diarrhea (from constipation) which can affect urination. GI PCR neg

## 2024-10-14 NOTE — PROGRESS NOTE ADULT - PROBLEM SELECTOR PLAN 1
#Bilateral leg weakness  Chronic, seen on prior admission in Sept '24. Likely partially 2/2 chronic BPPV vs other spinal nerve compressions. Was discharged in Sept '24 w/ plan for vestibular rehab - was not able to go because of insurance issues but found a place w/ openings next week.   Weakness also possibly exacerbated iso UTI, & iso known spinal nerve compressions/stenosis.   MR Lumbar spine w/ L4-L5 spinal canal stenosis, L5-S1 nerve root compression, NO cord compression     Plan:   - PT rec RUBY  - Pt plan to attend vestibular rehab post-discharge  - Ortho consulted, recommend OP orthopedic follow up

## 2024-10-14 NOTE — PROGRESS NOTE ADULT - PROBLEM SELECTOR PLAN 7
Home meds: Spironolactone 50qd, Losartan 25qd  - Holding home meds for now iso soft BPs, restart as appropriate Home meds: Spironolactone 50qd, Losartan 25qd  - Holding home meds for normotensive BPs, restart as appropriate

## 2024-10-15 DIAGNOSIS — R19.7 DIARRHEA, UNSPECIFIED: ICD-10-CM

## 2024-10-15 LAB
ADD ON TEST-SPECIMEN IN LAB: SIGNIFICANT CHANGE UP
ALBUMIN SERPL ELPH-MCNC: 3.6 G/DL — SIGNIFICANT CHANGE UP (ref 3.3–5)
ALP SERPL-CCNC: 57 U/L — SIGNIFICANT CHANGE UP (ref 40–120)
ALT FLD-CCNC: 32 U/L — SIGNIFICANT CHANGE UP (ref 10–45)
ANION GAP SERPL CALC-SCNC: 10 MMOL/L — SIGNIFICANT CHANGE UP (ref 5–17)
ANION GAP SERPL CALC-SCNC: 13 MMOL/L — SIGNIFICANT CHANGE UP (ref 5–17)
ANION GAP SERPL CALC-SCNC: 9 MMOL/L — SIGNIFICANT CHANGE UP (ref 5–17)
AST SERPL-CCNC: 26 U/L — SIGNIFICANT CHANGE UP (ref 10–40)
BASOPHILS # BLD AUTO: 0.03 K/UL — SIGNIFICANT CHANGE UP (ref 0–0.2)
BASOPHILS NFR BLD AUTO: 0.4 % — SIGNIFICANT CHANGE UP (ref 0–2)
BILIRUB SERPL-MCNC: 0.5 MG/DL — SIGNIFICANT CHANGE UP (ref 0.2–1.2)
BUN SERPL-MCNC: 10 MG/DL — SIGNIFICANT CHANGE UP (ref 7–23)
BUN SERPL-MCNC: 10 MG/DL — SIGNIFICANT CHANGE UP (ref 7–23)
BUN SERPL-MCNC: 11 MG/DL — SIGNIFICANT CHANGE UP (ref 7–23)
C DIFF GDH STL QL: NEGATIVE — SIGNIFICANT CHANGE UP
C DIFF GDH STL QL: SIGNIFICANT CHANGE UP
CALCIUM SERPL-MCNC: 9.1 MG/DL — SIGNIFICANT CHANGE UP (ref 8.4–10.5)
CALCIUM SERPL-MCNC: 9.1 MG/DL — SIGNIFICANT CHANGE UP (ref 8.4–10.5)
CALCIUM SERPL-MCNC: 9.2 MG/DL — SIGNIFICANT CHANGE UP (ref 8.4–10.5)
CHLORIDE SERPL-SCNC: 101 MMOL/L — SIGNIFICANT CHANGE UP (ref 96–108)
CHLORIDE SERPL-SCNC: 102 MMOL/L — SIGNIFICANT CHANGE UP (ref 96–108)
CHLORIDE SERPL-SCNC: 103 MMOL/L — SIGNIFICANT CHANGE UP (ref 96–108)
CO2 SERPL-SCNC: 25 MMOL/L — SIGNIFICANT CHANGE UP (ref 22–31)
CO2 SERPL-SCNC: 26 MMOL/L — SIGNIFICANT CHANGE UP (ref 22–31)
CO2 SERPL-SCNC: 27 MMOL/L — SIGNIFICANT CHANGE UP (ref 22–31)
CREAT SERPL-MCNC: 0.75 MG/DL — SIGNIFICANT CHANGE UP (ref 0.5–1.3)
CREAT SERPL-MCNC: 0.79 MG/DL — SIGNIFICANT CHANGE UP (ref 0.5–1.3)
CREAT SERPL-MCNC: 0.81 MG/DL — SIGNIFICANT CHANGE UP (ref 0.5–1.3)
EGFR: 91 ML/MIN/1.73M2 — SIGNIFICANT CHANGE UP
EGFR: 92 ML/MIN/1.73M2 — SIGNIFICANT CHANGE UP
EGFR: 93 ML/MIN/1.73M2 — SIGNIFICANT CHANGE UP
EOSINOPHIL # BLD AUTO: 0.14 K/UL — SIGNIFICANT CHANGE UP (ref 0–0.5)
EOSINOPHIL NFR BLD AUTO: 2 % — SIGNIFICANT CHANGE UP (ref 0–6)
GLUCOSE BLDC GLUCOMTR-MCNC: 125 MG/DL — HIGH (ref 70–99)
GLUCOSE BLDC GLUCOMTR-MCNC: 134 MG/DL — HIGH (ref 70–99)
GLUCOSE BLDC GLUCOMTR-MCNC: 95 MG/DL — SIGNIFICANT CHANGE UP (ref 70–99)
GLUCOSE BLDC GLUCOMTR-MCNC: 98 MG/DL — SIGNIFICANT CHANGE UP (ref 70–99)
GLUCOSE SERPL-MCNC: 116 MG/DL — HIGH (ref 70–99)
GLUCOSE SERPL-MCNC: 123 MG/DL — HIGH (ref 70–99)
GLUCOSE SERPL-MCNC: 99 MG/DL — SIGNIFICANT CHANGE UP (ref 70–99)
HCT VFR BLD CALC: 43.1 % — SIGNIFICANT CHANGE UP (ref 39–50)
HGB BLD-MCNC: 13.9 G/DL — SIGNIFICANT CHANGE UP (ref 13–17)
IMM GRANULOCYTES NFR BLD AUTO: 0.3 % — SIGNIFICANT CHANGE UP (ref 0–0.9)
LYMPHOCYTES # BLD AUTO: 1.09 K/UL — SIGNIFICANT CHANGE UP (ref 1–3.3)
LYMPHOCYTES # BLD AUTO: 15.3 % — SIGNIFICANT CHANGE UP (ref 13–44)
MAGNESIUM SERPL-MCNC: 1.7 MG/DL — SIGNIFICANT CHANGE UP (ref 1.6–2.6)
MAGNESIUM SERPL-MCNC: 2.2 MG/DL — SIGNIFICANT CHANGE UP (ref 1.6–2.6)
MCHC RBC-ENTMCNC: 28.9 PG — SIGNIFICANT CHANGE UP (ref 27–34)
MCHC RBC-ENTMCNC: 32.3 GM/DL — SIGNIFICANT CHANGE UP (ref 32–36)
MCV RBC AUTO: 89.6 FL — SIGNIFICANT CHANGE UP (ref 80–100)
MONOCYTES # BLD AUTO: 0.69 K/UL — SIGNIFICANT CHANGE UP (ref 0–0.9)
MONOCYTES NFR BLD AUTO: 9.7 % — SIGNIFICANT CHANGE UP (ref 2–14)
NEUTROPHILS # BLD AUTO: 5.15 K/UL — SIGNIFICANT CHANGE UP (ref 1.8–7.4)
NEUTROPHILS NFR BLD AUTO: 72.3 % — SIGNIFICANT CHANGE UP (ref 43–77)
NRBC # BLD: 0 /100 WBCS — SIGNIFICANT CHANGE UP (ref 0–0)
PHOSPHATE SERPL-MCNC: 2.8 MG/DL — SIGNIFICANT CHANGE UP (ref 2.5–4.5)
PHOSPHATE SERPL-MCNC: 3.3 MG/DL — SIGNIFICANT CHANGE UP (ref 2.5–4.5)
PLATELET # BLD AUTO: 185 K/UL — SIGNIFICANT CHANGE UP (ref 150–400)
POTASSIUM SERPL-MCNC: 2.6 MMOL/L — CRITICAL LOW (ref 3.5–5.3)
POTASSIUM SERPL-MCNC: 3.2 MMOL/L — LOW (ref 3.5–5.3)
POTASSIUM SERPL-MCNC: 3.4 MMOL/L — LOW (ref 3.5–5.3)
POTASSIUM SERPL-SCNC: 2.6 MMOL/L — CRITICAL LOW (ref 3.5–5.3)
POTASSIUM SERPL-SCNC: 3.2 MMOL/L — LOW (ref 3.5–5.3)
POTASSIUM SERPL-SCNC: 3.4 MMOL/L — LOW (ref 3.5–5.3)
PROT SERPL-MCNC: 7.1 G/DL — SIGNIFICANT CHANGE UP (ref 6–8.3)
RBC # BLD: 4.81 M/UL — SIGNIFICANT CHANGE UP (ref 4.2–5.8)
RBC # FLD: 13.5 % — SIGNIFICANT CHANGE UP (ref 10.3–14.5)
SODIUM SERPL-SCNC: 138 MMOL/L — SIGNIFICANT CHANGE UP (ref 135–145)
SODIUM SERPL-SCNC: 138 MMOL/L — SIGNIFICANT CHANGE UP (ref 135–145)
SODIUM SERPL-SCNC: 140 MMOL/L — SIGNIFICANT CHANGE UP (ref 135–145)
WBC # BLD: 6.99 K/UL — SIGNIFICANT CHANGE UP (ref 3.8–10.5)
WBC # FLD AUTO: 6.99 K/UL — SIGNIFICANT CHANGE UP (ref 3.8–10.5)

## 2024-10-15 PROCEDURE — 99233 SBSQ HOSP IP/OBS HIGH 50: CPT

## 2024-10-15 RX ORDER — MAGNESIUM SULFATE 500 MG/ML
2 VIAL (ML) INJECTION ONCE
Refills: 0 | Status: COMPLETED | OUTPATIENT
Start: 2024-10-15 | End: 2024-10-15

## 2024-10-15 RX ORDER — MAGNESIUM SULFATE 500 MG/ML
2 VIAL (ML) INJECTION ONCE
Refills: 0 | Status: DISCONTINUED | OUTPATIENT
Start: 2024-10-15 | End: 2024-10-15

## 2024-10-15 RX ADMIN — Medication 25 GRAM(S): at 09:18

## 2024-10-15 RX ADMIN — FINASTERIDE 5 MILLIGRAM(S): 5 TABLET, FILM COATED ORAL at 13:08

## 2024-10-15 RX ADMIN — Medication 500 MILLIGRAM(S): at 06:55

## 2024-10-15 RX ADMIN — Medication 500 MILLIGRAM(S): at 18:56

## 2024-10-15 RX ADMIN — GABAPENTIN 300 MILLIGRAM(S): 800 TABLET, FILM COATED ORAL at 22:35

## 2024-10-15 RX ADMIN — Medication 100 MILLIEQUIVALENT(S): at 07:46

## 2024-10-15 RX ADMIN — Medication 40 MILLIEQUIVALENT(S): at 07:46

## 2024-10-15 RX ADMIN — Medication 100 MILLIEQUIVALENT(S): at 18:57

## 2024-10-15 RX ADMIN — Medication 40 MILLIEQUIVALENT(S): at 22:35

## 2024-10-15 RX ADMIN — Medication 100 MILLIEQUIVALENT(S): at 13:08

## 2024-10-15 RX ADMIN — Medication 100 MILLIEQUIVALENT(S): at 10:26

## 2024-10-15 RX ADMIN — Medication 81 MILLIGRAM(S): at 13:08

## 2024-10-15 RX ADMIN — GABAPENTIN 300 MILLIGRAM(S): 800 TABLET, FILM COATED ORAL at 06:55

## 2024-10-15 RX ADMIN — ENOXAPARIN SODIUM 40 MILLIGRAM(S): 150 INJECTION SUBCUTANEOUS at 18:56

## 2024-10-15 RX ADMIN — Medication 40 MILLIEQUIVALENT(S): at 18:56

## 2024-10-15 RX ADMIN — Medication 0.4 MILLIGRAM(S): at 22:35

## 2024-10-15 RX ADMIN — ZINC SULFATE 220 MILLIGRAM(S): 25 INJECTION, SOLUTION INTRAVENOUS at 13:15

## 2024-10-15 RX ADMIN — ENOXAPARIN SODIUM 40 MILLIGRAM(S): 150 INJECTION SUBCUTANEOUS at 06:55

## 2024-10-15 RX ADMIN — GABAPENTIN 300 MILLIGRAM(S): 800 TABLET, FILM COATED ORAL at 13:08

## 2024-10-15 RX ADMIN — Medication 20 MILLIEQUIVALENT(S): at 22:42

## 2024-10-15 NOTE — PROGRESS NOTE ADULT - PROBLEM SELECTOR PLAN 4
Presented w/ urinary retention. UA w/LE, ++WBC, many bacteria. UCx from prior admission 9/5 w/ >100K cfu Staph Epi, s/p course IV CTX. s/p CTX 1g in ED 10/11 AM.  NO dysuria, increased frequency or infectious symptoms.      Home meds: Tamsulosin 0.4mg qd, Finasteride 5mg qd    Plan  - Despite + UA, will monitor off antibiotics given patient does not have symptoms of infection . retention likely 2/2 to h/o BPH   - c/w home meds  - Failed TOV x3, felder inserted Affects all aspects of care

## 2024-10-15 NOTE — PROGRESS NOTE ADULT - PROBLEM SELECTOR PLAN 5
Home meds: Weekly Ozempic injections, Metformin 500mg BID. PT states he has never required insulin. Last A1c wnl     Plan  - DASH CC diet, mISS  - hold metformin while inpatient Presented w/ urinary retention. UA w/LE, ++WBC, many bacteria. UCx from prior admission 9/5 w/ >100K cfu Staph Epi, s/p course IV CTX. s/p CTX 1g in ED 10/11 AM.  NO dysuria, increased frequency or infectious symptoms.      Home meds: Tamsulosin 0.4mg qd, Finasteride 5mg qd    Plan  - Despite + UA, will monitor off antibiotics given patient does not have symptoms of infection . retention likely 2/2 to h/o BPH   - c/w home meds  - Failed TOV x3, felder inserted

## 2024-10-15 NOTE — PROGRESS NOTE ADULT - TIME BILLING
Evaluation of patient, review of charts, d/w PCM
Bedside exam and interview   Reviewed vitals, labs   Discussed patient's plan of care with house staff   Documentation of encounter  Excludes teaching time and/or separately reported services
Evaluation of patient, review of charts
Bedside exam and interview   Reviewed vitals, labs   Discussed patient's plan of care with house staff   Documentation of encounter  Excludes teaching time and/or separately reported services

## 2024-10-15 NOTE — PROGRESS NOTE ADULT - ATTENDING COMMENTS
78yo M w/ PMHx HTn, CAD, DM2, BPH, & peripheral neuropathy, lumbar spinal stenosis who presented with 1 month of bilateral LE weakness with difficulty urinating, found to have multi-level disc bulging with lumbar canal stenosis. MRI reviewed by Ortho c/w chronic degenerative changes with low suspicion for cauda equina syndrome and rec to f/u outpatient with outpatient thoracic/cervical MRI, pending RUBY.    #bilateral LE weakness, subacute on chronic - suspect 2/2 chronic myelopathy in the setting of spinal stenosis, disc bulging. Strength is intact but feels weak when walking. Appreciate PT, plan for RUBY and outpatient Ortho f/u.    #urinary retention #diarrhea - known BPH and history of difficulty urinating, worse over the last few days since hospitalizatiom Segura placed today due to intermittent retention requiring multiple straight caths over the weekend. Optimize mobility. KUB to eval for overflow diarrhea, GI PCR sent which was negative. C diff not done but no risk factors. Tolerating PO without n/v, abd distended but soft and nontender.
Patient was seen and examined at bedside on 10/13/2024 at 12 pm. Patient reports that he feels improved, wants to go home. Denies SOB, abdominal pain, N/V, urinary symptoms. Reports that he has had an increase in bowel movements. ROS is otherwise negative. Vitals, labwork and pertinent imaging reviewed. Exam - NAD, AAO x 4, PERRLA, EOMI, MMM, supple neck, chest - CTA b/l, CV - rrr, s1s2, no m/r/g, abd - soft, NTND, + BS, ext - wwp, psych - normal affect, skin - no rash, Neuro - 5/5 strength throughout, light sensation intact    Plan:  -TOV  -PT/OT rec RUBY  -Stool count
80yo M w/ PMHx HTn, CAD, DM2, BPH, & peripheral neuropathy, lumbar spinal stenosis who presented with 1 month of bilateral LE weakness with difficulty urinating, found to have multi-level disc bulging with lumbar canal stenosis. MRI reviewed by Ortho c/w chronic degenerative changes with low suspicion for cauda equina syndrome and rec to f/u outpatient with outpatient thoracic/cervical MRI.    #bilateral LE weakness, subacute on chronic - suspect 2/2 chronic myelopathy in the setting of spinal stenosis, disc bulging. Strength is intact but feels weak when walking. Appreciate PT, plan for RUBY and outpatient Ortho f/u.    #diarrhea  #electrolyte derangements  Known BPH and history of difficulty urinating, worse over the last few days since hospitalization and Segura placed 10/14 for intermittent retention requiring multiple straight caths over the weekend. KUB done 10/14 with mild colonic distension suggestive of colonic ileus without stool. Pt more distended on exam today but non-tender without rebound/guarding, and no n/v, tolerating PO diet with ongoing diarrhea/fecal incontinence with coughing. Re-c/s Ortho for c/f cauda equina syndrome given both bowel and bladder dysfunction but strength exam intact and prior MRI without cord compression.  - GI PCR, c diff are negative  - can resume liquid diet for oral rehydration, advance with improvement in ileus  - replete lytes, re-check in PM  - optimize mobility, PT  - NGT for decompression if having n/v  - monitor BMs

## 2024-10-15 NOTE — PROGRESS NOTE ADULT - PROBLEM SELECTOR PLAN 7
Home meds: Spironolactone 50qd, Losartan 25qd  - Holding home meds for normotensive BPs, restart as appropriate Iso long-standing DM. Home med: Gabapentin 300mg TID   - c/w home med

## 2024-10-15 NOTE — PROGRESS NOTE ADULT - SUBJECTIVE AND OBJECTIVE BOX
*****INCOMPLETE NOTE*****    INTERVAL HPI/OVERNIGHT EVENTS:    SUBJECTIVE: Patient seen and examined at bedside, resting comfortably in bed, and does not appear to be in any acute distress. Patient reports    Vital Signs Last 24 Hrs  T(C): 36.6 (14 Oct 2024 21:42), Max: 37 (14 Oct 2024 08:53)  T(F): 97.9 (14 Oct 2024 21:42), Max: 98.6 (14 Oct 2024 08:53)  HR: 95 (14 Oct 2024 21:42) (89 - 95)  BP: 122/72 (14 Oct 2024 21:42) (108/64 - 129/83)  BP(mean): --  RR: 18 (14 Oct 2024 21:42) (18 - 18)  SpO2: 92% (14 Oct 2024 21:42) (91% - 92%)    Parameters below as of 14 Oct 2024 21:42  Patient On (Oxygen Delivery Method): room air        PHYSICAL EXAM:  General: in no acute distress  Eyes: EOMI intact bilaterally. Anicteric sclerae, moist conjunctivae  HENT: Moist mucous membranes  Neck: Trachea midline, supple  Lungs: CTA B/L. No wheezes, rales, or rhonchi  Cardiovascular: RRR. No murmurs, rubs, or gallops  Abdomen: Soft, non-tender non-distended; No rebound or guarding  Extremities: WWP, No clubbing, cyanosis or edema  Neurological: Alert and oriented  Skin: Warm and dry. No obvious rash     LABS:         **** INCOMPLETE****    INTERVAL HPI/OVERNIGHT EVENTS: several episodes of diarrhea per nursing     SUBJECTIVE: Patient seen and examined at bedside, resting comfortably in bed, and does not appear to be in any acute distress. Patient has no complaints and states his last bowel movement was yesterday.     Vital Signs Last 24 Hrs  T(C): 36.6 (14 Oct 2024 21:42), Max: 37 (14 Oct 2024 08:53)  T(F): 97.9 (14 Oct 2024 21:42), Max: 98.6 (14 Oct 2024 08:53)  HR: 95 (14 Oct 2024 21:42) (89 - 95)  BP: 122/72 (14 Oct 2024 21:42) (108/64 - 129/83)  BP(mean): --  RR: 18 (14 Oct 2024 21:42) (18 - 18)  SpO2: 92% (14 Oct 2024 21:42) (91% - 92%)    Parameters below as of 14 Oct 2024 21:42  Patient On (Oxygen Delivery Method): room air      PHYSICAL EXAM:  General: in no acute distress  Eyes: EOMI intact bilaterally. Anicteric sclerae, moist conjunctivae. Segura in place.   HENT: Moist mucous membranes  Neck: Trachea midline, supple  Lungs: CTA B/L. No wheezes, rales, or rhonchi  Cardiovascular: RRR. No murmurs, rubs, or gallops  Abdomen: Distended. Mild tenderness to palpation. Sheets and towels visibly soiled.   Extremities: WWP, No clubbing, cyanosis or edema  Neurological: Alert and oriented  Skin: Warm and dry. No obvious rash     LABS:         **** INCOMPLETE****    INTERVAL HPI/OVERNIGHT EVENTS: several episodes of diarrhea per nursing. Per overnight provider, patient passively reported an episode of black stool. Pt also raised concern about left leg swelling, non tender.     SUBJECTIVE: Patient seen and examined at bedside, resting comfortably in bed, and does not appear to be in any acute distress. Patient has no complaints and states his last bowel movement was yesterday.     Vital Signs Last 24 Hrs  T(C): 36.6 (14 Oct 2024 21:42), Max: 37 (14 Oct 2024 08:53)  T(F): 97.9 (14 Oct 2024 21:42), Max: 98.6 (14 Oct 2024 08:53)  HR: 95 (14 Oct 2024 21:42) (89 - 95)  BP: 122/72 (14 Oct 2024 21:42) (108/64 - 129/83)  BP(mean): --  RR: 18 (14 Oct 2024 21:42) (18 - 18)  SpO2: 92% (14 Oct 2024 21:42) (91% - 92%)    Parameters below as of 14 Oct 2024 21:42  Patient On (Oxygen Delivery Method): room air      PHYSICAL EXAM:  General: in no acute distress  Eyes: EOMI intact bilaterally. Anicteric sclerae, moist conjunctivae. Segura in place.   HENT: Moist mucous membranes  Neck: Trachea midline, supple  Lungs: CTA B/L. No wheezes, rales, or rhonchi  Cardiovascular: RRR. No murmurs, rubs, or gallops  Abdomen: Distended. Mild tenderness to palpation. Sheets and towels visibly soiled.   Extremities: WWP, No clubbing, cyanosis or edema  Neurological: Alert and oriented  Skin: Warm and dry. No obvious rash     LABS:         **** INCOMPLETE****    INTERVAL HPI/OVERNIGHT EVENTS: several episodes of diarrhea per nursing. Per overnight provider, patient passively reported an episode of black stool. Pt also raised concern about left leg swelling, non tender.     SUBJECTIVE: Patient seen and examined at bedside, resting comfortably in bed, and does not appear to be in any acute distress. Patient has no complaints and states his last bowel movement was yesterday.     Vital Signs Last 24 Hrs  T(C): 36.6 (14 Oct 2024 21:42), Max: 37 (14 Oct 2024 08:53)  T(F): 97.9 (14 Oct 2024 21:42), Max: 98.6 (14 Oct 2024 08:53)  HR: 95 (14 Oct 2024 21:42) (89 - 95)  BP: 122/72 (14 Oct 2024 21:42) (108/64 - 129/83)  BP(mean): --  RR: 18 (14 Oct 2024 21:42) (18 - 18)  SpO2: 92% (14 Oct 2024 21:42) (91% - 92%)    Parameters below as of 14 Oct 2024 21:42  Patient On (Oxygen Delivery Method): room air      PHYSICAL EXAM:  General: in no acute distress  Eyes: EOMI intact bilaterally. Anicteric sclerae, moist conjunctivae. Segura in place.   HENT: Moist mucous membranes  Neck: Trachea midline, supple  Lungs: CTA B/L. No wheezes, rales, or rhonchi  Cardiovascular: RRR. No murmurs, rubs, or gallops  Abdomen: Distended. Mild tenderness to palpation. Sheets and towels visibly soiled.   Extremities: WWP, No clubbing, cyanosis or edema. LLE cool to touch compared to RLE.   Neurological: Alert and oriented  Skin: Warm and dry. No obvious rash     LABS:         INTERVAL HPI/OVERNIGHT EVENTS: several episodes of diarrhea per nursing. Per overnight provider, patient passively reported an episode of black stool. Pt also raised concern about left leg swelling, non tender.     SUBJECTIVE: Patient seen and examined at bedside, resting comfortably in bed, and does not appear to be in any acute distress. Patient has no complaints and states his last bowel movement was yesterday.     Vital Signs Last 24 Hrs  T(C): 36.6 (14 Oct 2024 21:42), Max: 37 (14 Oct 2024 08:53)  T(F): 97.9 (14 Oct 2024 21:42), Max: 98.6 (14 Oct 2024 08:53)  HR: 95 (14 Oct 2024 21:42) (89 - 95)  BP: 122/72 (14 Oct 2024 21:42) (108/64 - 129/83)  BP(mean): --  RR: 18 (14 Oct 2024 21:42) (18 - 18)  SpO2: 92% (14 Oct 2024 21:42) (91% - 92%)    Parameters below as of 14 Oct 2024 21:42  Patient On (Oxygen Delivery Method): room air      PHYSICAL EXAM:  General: in no acute distress  Eyes: EOMI intact bilaterally. Anicteric sclerae, moist conjunctivae. Segura in place.   HENT: Moist mucous membranes  Neck: Trachea midline, supple  Lungs: CTA B/L. No wheezes, rales, or rhonchi  Cardiovascular: RRR. No murmurs, rubs, or gallops  Abdomen: Distended. Mild tenderness to palpation. Sheets and towels visibly soiled.   Extremities: WWP, No clubbing, cyanosis or edema. LLE cool to touch compared to RLE.   Neurological: Alert and oriented  Skin: Warm and dry. No obvious rash     LABS:

## 2024-10-15 NOTE — PROGRESS NOTE ADULT - PROBLEM SELECTOR PLAN 1
#Bilateral leg weakness  Chronic, seen on prior admission in Sept '24. Likely partially 2/2 chronic BPPV vs other spinal nerve compressions. Was discharged in Sept '24 w/ plan for vestibular rehab - was not able to go because of insurance issues but found a place w/ openings next week.   Weakness also possibly exacerbated iso UTI, & iso known spinal nerve compressions/stenosis.   MR Lumbar spine w/ L4-L5 spinal canal stenosis, L5-S1 nerve root compression, NO cord compression     Plan:   - PT rec RUBY  - Pt plan to attend vestibular rehab post-discharge  - Ortho consulted, recommend OP orthopedic follow up #Bilateral leg weakness  Chronic, seen on prior admission in Sept '24. Likely partially 2/2 chronic BPPV vs other spinal nerve compressions. Was discharged in Sept '24 w/ plan for vestibular rehab - was not able to go because of insurance issues but found a place w/ openings next week.   Weakness also possibly exacerbated iso UTI, & iso known spinal nerve compressions/stenosis.   MR Lumbar spine w/ L4-L5 spinal canal stenosis, L5-S1 nerve root compression, NO cord compression     Plan:   - reconsult Ortho given new urinary retention and persistent diarrhea   - PT rec RUBY, pt not ready for dc today

## 2024-10-15 NOTE — PROGRESS NOTE ADULT - PROBLEM SELECTOR PLAN 3
Affects all aspects of care Patient failed TOVs. Per nursing, patient has been incontinent throughout the evening of 10/12- 10/13 with bladder scans throughout the evening with very little urine or 0mL.     KUB: Mild gaseous distention of colonic loops measuring up to 6.7 cm suggesting colonic ileus. No stool burden.    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

## 2024-10-15 NOTE — PROGRESS NOTE ADULT - PROBLEM SELECTOR PLAN 8
Home med ASA 81mg qd  - c/w home med Home meds: Spironolactone 50qd, Losartan 25qd  - Holding home meds for normotensive BPs, restart as appropriate

## 2024-10-15 NOTE — PROGRESS NOTE ADULT - SUBJECTIVE AND OBJECTIVE BOX
Physical Medicine and Rehabilitation Progress Note :       Patient is a 79y old  Male who presents with a chief complaint of UTI, leg weakness (15 Oct 2024 06:03)      HPI:  80yo M w/ PMHx HTn, CAD, DM2, BPH, & peripheral neuropathy, lumbar spinal stenosis - p/w bilat lower ext weakness & inability to walk which has been worsening since ~1mo ago. Fell the day before presenting to ED which he states was due to weakness. Denies any presyncopal sx, LOC, head strike. Says he's been feeling tired lately but this is the first time he's fell in a while. Has been feeling dizzy w/ unsteady gait for past few weeks, but that his leg weakness has felt worse over last few days specifically.  Also endorses that the hasn't felt the urge to pee, but notices that he feels bloated, is able to void partially but still feels bladder fullness.  No dysuria or hematuria that he's noticed. Denies any recent fever, chills, flank pain, bowel incontinence, or urinary incontinence.     Vitals: HR 97.8F, HR 66, BP 90s-100s/60s. RR 18 sat 98% RA  Labs: CBC wnl, BMP unrevealing (BUN/Cr 14/1.17). UA w/LE, ++WBC, many bacteria. Limited RVP neg.   CT Head non con: No acute intracranial hemorrhage  MR Lumbar spine: No acute fracture. +L5-S1 disc protrusion w/ compression on anterior thecal sac, spinal canal stenosis centrally. Extension of disc protrusion/herniation to left side w/ compromise of L5-S1 foramen, likely exiting nerve root compression. Moderate L4-L5 annular bulge L4-L5 w/ marked facet hypertrophy causing central & bilateral foraminal stenosis.   Consults: Ortho   (11 Oct 2024 03:51)                            13.9   6.99  )-----------( 185      ( 15 Oct 2024 05:30 )             43.1       10-15    140  |  101  |  10  ----------------------------<  116[H]  2.6[LL]   |  26  |  0.75    Ca    9.1      15 Oct 2024 05:30  Phos  3.3     10-15  Mg     1.7     10-15    TPro  7.1  /  Alb  3.6  /  TBili  0.5  /  DBili  x   /  AST  26  /  ALT  32  /  AlkPhos  57  10-15    Vital Signs Last 24 Hrs  T(C): 36.8 (15 Oct 2024 09:42), Max: 36.8 (15 Oct 2024 09:42)  T(F): 98.2 (15 Oct 2024 09:42), Max: 98.2 (15 Oct 2024 09:42)  HR: 95 (15 Oct 2024 09:42) (91 - 97)  BP: 120/71 (15 Oct 2024 09:42) (111/70 - 129/83)  BP(mean): --  RR: 17 (15 Oct 2024 09:42) (17 - 18)  SpO2: 95% (15 Oct 2024 09:42) (92% - 95%)    Parameters below as of 15 Oct 2024 09:42  Patient On (Oxygen Delivery Method): nasal cannula  O2 Flow (L/min): 2      MEDICATIONS  (STANDING):  ascorbic acid 500 milliGRAM(s) Oral two times a day  aspirin enteric coated 81 milliGRAM(s) Oral daily  dextrose 5%. 1000 milliLiter(s) (50 mL/Hr) IV Continuous <Continuous>  dextrose 5%. 1000 milliLiter(s) (100 mL/Hr) IV Continuous <Continuous>  dextrose 50% Injectable 12.5 Gram(s) IV Push once  dextrose 50% Injectable 25 Gram(s) IV Push once  dextrose 50% Injectable 25 Gram(s) IV Push once  enoxaparin Injectable 40 milliGRAM(s) SubCutaneous every 12 hours  finasteride 5 milliGRAM(s) Oral daily  gabapentin 300 milliGRAM(s) Oral every 8 hours  glucagon  Injectable 1 milliGRAM(s) IntraMuscular once  insulin lispro (ADMELOG) corrective regimen sliding scale   SubCutaneous Before meals and at bedtime  potassium chloride  10 mEq/100 mL IVPB 10 milliEquivalent(s) IV Intermittent every 1 hour  tamsulosin 0.4 milliGRAM(s) Oral at bedtime  zinc sulfate 220 milliGRAM(s) Oral daily    MEDICATIONS  (PRN):  dextrose Oral Gel 15 Gram(s) Oral once PRN Blood Glucose LESS THAN 70 milliGRAM(s)/deciliter      T(C): 36.8 (10-15-24 @ 09:42), Max: 36.8 (10-15-24 @ 09:42)  HR: 95 (10-15-24 @ 09:42) (91 - 97)  BP: 120/71 (10-15-24 @ 09:42) (111/70 - 129/83)  RR: 17 (10-15-24 @ 09:42) (17 - 18)  SpO2: 95% (10-15-24 @ 09:42) (92% - 95%)    Physical Exam:   79 y o man lying comfortably in semi Rock's position , awake , alert , no acute complaints     Head: normocephalic , atraumatic    Eyes: PERRLA , EOMI , no nystagmus , sclera anicteric    ENT / FACE: neg nasal discharge , uvula midline , no oropharyngeal erythema / exudate    Neck: supple , negative JVD , negative carotid bruits , no thyromegaly    Chest: CTA bilaterally , neg wheeze / rhonchi / rales / crackles / egophany    Cardiovascular: regular rate and rhythm , neg murmurs / rubs / gallops    Abdomen: soft , non distended , no tenderness to palpation in all 4 quadrants ,  normal bowel sounds     Lower Extremities: WWP , neg cyanosis /clubbing / edema      Neurologic Exam:     Alert and oriented  x 3    Motor Exam:        > 3+/5 x 4 extremities      Sensation:         intact to light touch x 4 extremities     DTR:           biceps/brachioradialis: equal                            patella/ankle: equal        10/14/2024  Functional Status Assessment :       Pain Assessment/Number Scale (0-10) Adult  Presence of Pain: denies pain/discomfort (Rating = 0)  Pain Rating (0-10): Rest: 0 (no pain/absence of nonverbal indicators of pain)  Pain Rating (0-10): Activity: 0 (no pain/absence of nonverbal indicators of pain)    Safety      AM-PAC Functional Assessment: Basic Mobility  Type of Assessment: Daily assessment  Turning from your back to your side while in a flat bed without using bedrails?: 2 = A lot of assistance  Moving from lying on your back to sitting on the flat side of a flat bed without using bedrails?: 2 = A lot of assistance  Moving to and from a bed to a chair (including a wheelchair)?: 2 = A lot of assistance  Standing up from a chair using your arms (e.g. wheelchair or bedside chair)?: 2 = A lot of assistance  Walking in hospital room?: 2 = A lot of assistance  Climbing 3-5 steps with a railing?: 1 = Total assistance  Score: 11   Row Comment: Ask the patient "How much help from another person do you currently need? (If the patient hasn't done an activity recently, how much help from another person do you think he/she needs if he/she tried?)    Cognitive/Neuro      Cognitive/Neuro/Behavioral  Cognitive/Neuro/Behavioral [WDL Definition: Alert; opens eyes spontaneously; arouses to voice or touch; oriented x 4; follows commands; speech spontaneous, logical; purposeful motor response; behavior appropriate to situation]: WDL    Language Assistance  Preferred Language to Address Healthcare Preferred Language to Address Healthcare: English    Therapeutic Interventions      Bed Mobility  Bed Mobility Training Rolling/Turning: maximum assist (25% patient effort);  2 person assist  Bed Mobility Training Scooting: maximum assist (25% patient effort);  2 person assist  Bed Mobility Training Bridging: maximum assist (25% patient effort);  2 person assist  Bed Mobility Training Sit-to-Supine: maximum assist (25% patient effort);  2 person assist  Bed Mobility Training Supine-to-Sit: 2 person assist;  maximum assist (25% patient effort)  Bed Mobility Training Limitations: impaired ability to control trunk for mobility;  decreased strength;  impaired balance;  impaired postural control    Sit-Stand Transfer Training  Transfer Training Sit-to-Stand Transfer: maximum assist (25% patient effort);  2 person assist;  rolling walker  Transfer Training Stand-to-Sit Transfer: maximum assist (25% patient effort);  2 person assist;  rolling walker  Sit-to-Stand Transfer Training Transfer Safety Analysis: decreased weight-shifting ability;  decreased sequencing ability;  decreased balance;  decreased strength;  impaired balance;  impaired postural control    Gait Training  Gait Training Treatment not Performed: patient unavailable for treatment,  2/2 dec standing balance    Therapeutic Exercise  Therapeutic Exercise Detail: Dangle BLE off EOB ~5 mins for goal of performing upright sitting balance. Pt w/ slight retropulsion and left truncal lean noted - VC and TC provided to initiate COG within LOS.             PM&R Impression : as above    Current disposition plan recommendation :    subacute rehab placement

## 2024-10-15 NOTE — PROGRESS NOTE ADULT - PROBLEM SELECTOR PLAN 2
Patient failed TOVs. Per nursing, patient has been incontinent throughout the evening of 10/12- 10/13 with bladder scans throughout the evening with very little urine or 0mL.     KUB: Mild gaseous distention of colonic loops measuring up to 6.7 cm suggesting colonic ileus. No stool burden.    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 GI PCR ordered and was negative. Pt with significant ABD distension. K to 2.6 and Mag 1.7 this morning, repleted.     KUB: Mild gaseous distention of colonic loops measuring up to 6.7 cm suggesting colonic ileus. No stool burden.    Plan   - obtain C diff   - reconsult ortho given new symptom and prior MRI findings   - hold imodium until C diff result returns   - low threshold for NG decompression if patient begins to develop abdominal pain, worsening distension   - q4h abdominal exams   - recheck CMP, Mag, Phos after repletion is completed.

## 2024-10-15 NOTE — PROGRESS NOTE ADULT - SUBJECTIVE AND OBJECTIVE BOX
Ortho Note    Pt comfortable without complaints, pain controlled. Pt reporting diarrhea for the last couple of days since he has been admitted to the hospital. Pt also reported by primary team to be retaining urine   Denies new onset back pain, CP SOB, N/V, weakness, numbness/tingling to the perineal area, fevers, chills    Vital Signs Last 24 Hrs  T(C): 36.8 (10-15-24 @ 09:42), Max: 36.8 (10-15-24 @ 09:42)  T(F): 98.2 (10-15-24 @ 09:42), Max: 98.2 (10-15-24 @ 09:42)  HR: 95 (10-15-24 @ 09:42) (95 - 95)  BP: 120/71 (10-15-24 @ 09:42) (120/71 - 120/71)  BP(mean): --  RR: 17 (10-15-24 @ 09:42) (17 - 17)  SpO2: 95% (10-15-24 @ 09:42) (95% - 95%)  I&O's Summary    14 Oct 2024 07:01  -  15 Oct 2024 07:00  --------------------------------------------------------  IN: 0 mL / OUT: 1050 mL / NET: -1050 mL        General: Pt Alert and oriented, NAD  Pulses: 2+ DP/. Skin warm, dry, well perfused b/l  Sensation: SILT L3-S1 b/l  Motor: Quad/Ham/EHL/FHL/TA/GS 5/5 b/l                          13.9   6.99  )-----------( 185      ( 15 Oct 2024 05:30 )             43.1     10-15    140  |  101  |  10  ----------------------------<  116[H]  2.6[LL]   |  26  |  0.75    Ca    9.1      15 Oct 2024 05:30  Phos  3.3     10-15  Mg     1.7     10-15    TPro  7.1  /  Alb  3.6  /  TBili  0.5  /  DBili  x   /  AST  26  /  ALT  32  /  AlkPhos  57  10-15      A/P: 79yMale with lower back paoin  - Stable. No acute concern for cauda equina at this time based on previous MRI imaging and physical exam findings  - Recommendation for outpatient thoracic and cervical MRI remains   - Pain Control  - DVT ppx: SCDs  - PT, WBS: WBAT  - Dispo: Per primary team    Ortho Pager [phone] Ortho Note    Pt comfortable without complaints, pain controlled. Pt reporting diarrhea for the last couple of days since he has been admitted to the hospital. Pt also reported by primary team to be retaining urine   Denies new onset back pain, CP SOB, N/V, weakness, numbness/tingling to the perineal area, fevers, chills    Vital Signs Last 24 Hrs  T(C): 36.8 (10-15-24 @ 09:42), Max: 36.8 (10-15-24 @ 09:42)  T(F): 98.2 (10-15-24 @ 09:42), Max: 98.2 (10-15-24 @ 09:42)  HR: 95 (10-15-24 @ 09:42) (95 - 95)  BP: 120/71 (10-15-24 @ 09:42) (120/71 - 120/71)  BP(mean): --  RR: 17 (10-15-24 @ 09:42) (17 - 17)  SpO2: 95% (10-15-24 @ 09:42) (95% - 95%)  I&O's Summary    14 Oct 2024 07:01  -  15 Oct 2024 07:00  --------------------------------------------------------  IN: 0 mL / OUT: 1050 mL / NET: -1050 mL        General: Pt Alert and oriented, NAD  Pulses: 2+ DP/. Skin warm, dry, well perfused b/l  Sensation: SILT L3-S1 b/l  Motor: Quad/Ham/EHL/FHL/TA/GS 5/5 b/l                          13.9   6.99  )-----------( 185      ( 15 Oct 2024 05:30 )             43.1     10-15    140  |  101  |  10  ----------------------------<  116[H]  2.6[LL]   |  26  |  0.75    Ca    9.1      15 Oct 2024 05:30  Phos  3.3     10-15  Mg     1.7     10-15    TPro  7.1  /  Alb  3.6  /  TBili  0.5  /  DBili  x   /  AST  26  /  ALT  32  /  AlkPhos  57  10-15      A/P: 79yMale with lower back paoin  - Stable. No acute concern for cauda equina at this time based on previous MRI imaging and physical exam findings  - Recommendation for outpatient thoracic and cervical MRI remains, but may be completed outpatient  - Pain Control  - DVT ppx: SCDs  - PT, WBS: WBAT  - Dispo: Per primary team    Ortho Pager [phone]

## 2024-10-15 NOTE — PROGRESS NOTE ADULT - PROBLEM SELECTOR PLAN 9
F: None  E: Replete PRN  N: DASH CC  P: Lovenox 40     Dispo: [facility] Home med ASA 81mg qd  - c/w home med

## 2024-10-15 NOTE — PROGRESS NOTE ADULT - PROBLEM SELECTOR PLAN 6
Iso long-standing DM. Home med: Gabapentin 300mg TID   - c/w home med Home meds: Weekly Ozempic injections, Metformin 500mg BID. PT states he has never required insulin. Last A1c wnl     Plan  - DASH CC diet, mISS  - hold metformin while inpatient

## 2024-10-16 ENCOUNTER — TRANSCRIPTION ENCOUNTER (OUTPATIENT)
Age: 79
End: 2024-10-16

## 2024-10-16 ENCOUNTER — NON-APPOINTMENT (OUTPATIENT)
Age: 79
End: 2024-10-16

## 2024-10-16 VITALS
DIASTOLIC BLOOD PRESSURE: 75 MMHG | HEART RATE: 91 BPM | OXYGEN SATURATION: 94 % | RESPIRATION RATE: 18 BRPM | SYSTOLIC BLOOD PRESSURE: 139 MMHG | TEMPERATURE: 98 F

## 2024-10-16 DIAGNOSIS — R26.81 UNSTEADINESS ON FEET: ICD-10-CM

## 2024-10-16 DIAGNOSIS — R29.898 OTHER SYMPTOMS AND SIGNS INVOLVING THE MUSCULOSKELETAL SYSTEM: ICD-10-CM

## 2024-10-16 LAB
ALBUMIN SERPL ELPH-MCNC: 3.3 G/DL — SIGNIFICANT CHANGE UP (ref 3.3–5)
ALP SERPL-CCNC: 51 U/L — SIGNIFICANT CHANGE UP (ref 40–120)
ALT FLD-CCNC: 29 U/L — SIGNIFICANT CHANGE UP (ref 10–45)
ANION GAP SERPL CALC-SCNC: 11 MMOL/L — SIGNIFICANT CHANGE UP (ref 5–17)
AST SERPL-CCNC: 20 U/L — SIGNIFICANT CHANGE UP (ref 10–40)
BASOPHILS # BLD AUTO: 0.04 K/UL — SIGNIFICANT CHANGE UP (ref 0–0.2)
BASOPHILS NFR BLD AUTO: 0.6 % — SIGNIFICANT CHANGE UP (ref 0–2)
BILIRUB SERPL-MCNC: 0.5 MG/DL — SIGNIFICANT CHANGE UP (ref 0.2–1.2)
BUN SERPL-MCNC: 10 MG/DL — SIGNIFICANT CHANGE UP (ref 7–23)
CALCIUM SERPL-MCNC: 8.7 MG/DL — SIGNIFICANT CHANGE UP (ref 8.4–10.5)
CHLORIDE SERPL-SCNC: 105 MMOL/L — SIGNIFICANT CHANGE UP (ref 96–108)
CO2 SERPL-SCNC: 24 MMOL/L — SIGNIFICANT CHANGE UP (ref 22–31)
CREAT SERPL-MCNC: 0.75 MG/DL — SIGNIFICANT CHANGE UP (ref 0.5–1.3)
EGFR: 93 ML/MIN/1.73M2 — SIGNIFICANT CHANGE UP
EOSINOPHIL # BLD AUTO: 0.16 K/UL — SIGNIFICANT CHANGE UP (ref 0–0.5)
EOSINOPHIL NFR BLD AUTO: 2.3 % — SIGNIFICANT CHANGE UP (ref 0–6)
GLUCOSE BLDC GLUCOMTR-MCNC: 107 MG/DL — HIGH (ref 70–99)
GLUCOSE BLDC GLUCOMTR-MCNC: 114 MG/DL — HIGH (ref 70–99)
GLUCOSE BLDC GLUCOMTR-MCNC: 134 MG/DL — HIGH (ref 70–99)
GLUCOSE SERPL-MCNC: 112 MG/DL — HIGH (ref 70–99)
HCT VFR BLD CALC: 41.1 % — SIGNIFICANT CHANGE UP (ref 39–50)
HGB BLD-MCNC: 12.9 G/DL — LOW (ref 13–17)
IMM GRANULOCYTES NFR BLD AUTO: 0.4 % — SIGNIFICANT CHANGE UP (ref 0–0.9)
LYMPHOCYTES # BLD AUTO: 1.11 K/UL — SIGNIFICANT CHANGE UP (ref 1–3.3)
LYMPHOCYTES # BLD AUTO: 16.1 % — SIGNIFICANT CHANGE UP (ref 13–44)
MAGNESIUM SERPL-MCNC: 1.8 MG/DL — SIGNIFICANT CHANGE UP (ref 1.6–2.6)
MCHC RBC-ENTMCNC: 27.8 PG — SIGNIFICANT CHANGE UP (ref 27–34)
MCHC RBC-ENTMCNC: 31.4 GM/DL — LOW (ref 32–36)
MCV RBC AUTO: 88.6 FL — SIGNIFICANT CHANGE UP (ref 80–100)
MONOCYTES # BLD AUTO: 0.65 K/UL — SIGNIFICANT CHANGE UP (ref 0–0.9)
MONOCYTES NFR BLD AUTO: 9.4 % — SIGNIFICANT CHANGE UP (ref 2–14)
NEUTROPHILS # BLD AUTO: 4.92 K/UL — SIGNIFICANT CHANGE UP (ref 1.8–7.4)
NEUTROPHILS NFR BLD AUTO: 71.2 % — SIGNIFICANT CHANGE UP (ref 43–77)
NRBC # BLD: 0 /100 WBCS — SIGNIFICANT CHANGE UP (ref 0–0)
PHOSPHATE SERPL-MCNC: 2.8 MG/DL — SIGNIFICANT CHANGE UP (ref 2.5–4.5)
PLATELET # BLD AUTO: 213 K/UL — SIGNIFICANT CHANGE UP (ref 150–400)
POTASSIUM SERPL-MCNC: 3.5 MMOL/L — SIGNIFICANT CHANGE UP (ref 3.5–5.3)
POTASSIUM SERPL-SCNC: 3.5 MMOL/L — SIGNIFICANT CHANGE UP (ref 3.5–5.3)
PROT SERPL-MCNC: 6.5 G/DL — SIGNIFICANT CHANGE UP (ref 6–8.3)
RBC # BLD: 4.64 M/UL — SIGNIFICANT CHANGE UP (ref 4.2–5.8)
RBC # FLD: 13.3 % — SIGNIFICANT CHANGE UP (ref 10.3–14.5)
SODIUM SERPL-SCNC: 140 MMOL/L — SIGNIFICANT CHANGE UP (ref 135–145)
WBC # BLD: 6.91 K/UL — SIGNIFICANT CHANGE UP (ref 3.8–10.5)
WBC # FLD AUTO: 6.91 K/UL — SIGNIFICANT CHANGE UP (ref 3.8–10.5)

## 2024-10-16 PROCEDURE — 85027 COMPLETE CBC AUTOMATED: CPT

## 2024-10-16 PROCEDURE — 83735 ASSAY OF MAGNESIUM: CPT

## 2024-10-16 PROCEDURE — 87324 CLOSTRIDIUM AG IA: CPT

## 2024-10-16 PROCEDURE — 74018 RADEX ABDOMEN 1 VIEW: CPT

## 2024-10-16 PROCEDURE — 96374 THER/PROPH/DIAG INJ IV PUSH: CPT

## 2024-10-16 PROCEDURE — 87507 IADNA-DNA/RNA PROBE TQ 12-25: CPT

## 2024-10-16 PROCEDURE — 86850 RBC ANTIBODY SCREEN: CPT

## 2024-10-16 PROCEDURE — 87449 NOS EACH ORGANISM AG IA: CPT

## 2024-10-16 PROCEDURE — 86900 BLOOD TYPING SEROLOGIC ABO: CPT

## 2024-10-16 PROCEDURE — 87637 SARSCOV2&INF A&B&RSV AMP PRB: CPT

## 2024-10-16 PROCEDURE — 36415 COLL VENOUS BLD VENIPUNCTURE: CPT

## 2024-10-16 PROCEDURE — 97110 THERAPEUTIC EXERCISES: CPT

## 2024-10-16 PROCEDURE — 99285 EMERGENCY DEPT VISIT HI MDM: CPT | Mod: 25

## 2024-10-16 PROCEDURE — 72148 MRI LUMBAR SPINE W/O DYE: CPT | Mod: MC

## 2024-10-16 PROCEDURE — 85025 COMPLETE CBC W/AUTO DIFF WBC: CPT

## 2024-10-16 PROCEDURE — 71045 X-RAY EXAM CHEST 1 VIEW: CPT

## 2024-10-16 PROCEDURE — 87086 URINE CULTURE/COLONY COUNT: CPT

## 2024-10-16 PROCEDURE — 97530 THERAPEUTIC ACTIVITIES: CPT

## 2024-10-16 PROCEDURE — 86901 BLOOD TYPING SEROLOGIC RH(D): CPT

## 2024-10-16 PROCEDURE — 87077 CULTURE AEROBIC IDENTIFY: CPT

## 2024-10-16 PROCEDURE — 97165 OT EVAL LOW COMPLEX 30 MIN: CPT

## 2024-10-16 PROCEDURE — 99239 HOSP IP/OBS DSCHRG MGMT >30: CPT

## 2024-10-16 PROCEDURE — 82962 GLUCOSE BLOOD TEST: CPT

## 2024-10-16 PROCEDURE — 97161 PT EVAL LOW COMPLEX 20 MIN: CPT

## 2024-10-16 PROCEDURE — 84100 ASSAY OF PHOSPHORUS: CPT

## 2024-10-16 PROCEDURE — 84484 ASSAY OF TROPONIN QUANT: CPT

## 2024-10-16 PROCEDURE — 81001 URINALYSIS AUTO W/SCOPE: CPT

## 2024-10-16 PROCEDURE — 80048 BASIC METABOLIC PNL TOTAL CA: CPT

## 2024-10-16 PROCEDURE — 97116 GAIT TRAINING THERAPY: CPT

## 2024-10-16 PROCEDURE — 80053 COMPREHEN METABOLIC PANEL: CPT

## 2024-10-16 PROCEDURE — 70450 CT HEAD/BRAIN W/O DYE: CPT | Mod: MC

## 2024-10-16 RX ADMIN — Medication 81 MILLIGRAM(S): at 12:28

## 2024-10-16 RX ADMIN — GABAPENTIN 300 MILLIGRAM(S): 800 TABLET, FILM COATED ORAL at 13:17

## 2024-10-16 RX ADMIN — Medication 500 MILLIGRAM(S): at 17:27

## 2024-10-16 RX ADMIN — GABAPENTIN 300 MILLIGRAM(S): 800 TABLET, FILM COATED ORAL at 06:38

## 2024-10-16 RX ADMIN — FINASTERIDE 5 MILLIGRAM(S): 5 TABLET, FILM COATED ORAL at 12:28

## 2024-10-16 RX ADMIN — ZINC SULFATE 220 MILLIGRAM(S): 25 INJECTION, SOLUTION INTRAVENOUS at 12:28

## 2024-10-16 RX ADMIN — ENOXAPARIN SODIUM 40 MILLIGRAM(S): 150 INJECTION SUBCUTANEOUS at 18:25

## 2024-10-16 RX ADMIN — ENOXAPARIN SODIUM 40 MILLIGRAM(S): 150 INJECTION SUBCUTANEOUS at 06:38

## 2024-10-16 RX ADMIN — Medication 500 MILLIGRAM(S): at 06:38

## 2024-10-16 NOTE — PROGRESS NOTE ADULT - SUBJECTIVE AND OBJECTIVE BOX
Patient is a 79y old  Male who presents with a chief complaint of UTI, leg weakness (15 Oct 2024 12:32)      HOSPITAL COURSE:     OVERNIGHT EVENTS:    SUBJECTIVE:     ROS: otherwise negative      T(C): 36.8 (10-16-24 @ 05:42), Max: 36.8 (10-15-24 @ 15:29)  HR: 90 (10-16-24 @ 05:42) (77 - 90)  BP: 116/67 (10-16-24 @ 05:42) (116/67 - 132/77)  RR: 18 (10-16-24 @ 05:42) (18 - 18)  SpO2: 96% (10-16-24 @ 05:42) (93% - 96%)  Wt(kg): --Vital Signs Last 24 Hrs  T(C): 36.8 (16 Oct 2024 05:42), Max: 36.8 (15 Oct 2024 15:29)  T(F): 98.2 (16 Oct 2024 05:42), Max: 98.3 (15 Oct 2024 15:29)  HR: 90 (16 Oct 2024 05:42) (77 - 90)  BP: 116/67 (16 Oct 2024 05:42) (116/67 - 132/77)  BP(mean): --  RR: 18 (16 Oct 2024 05:42) (18 - 18)  SpO2: 96% (16 Oct 2024 05:42) (93% - 96%)    Parameters below as of 16 Oct 2024 05:42  Patient On (Oxygen Delivery Method): nasal cannula  O2 Flow (L/min): 2      PHYSICAL EXAM:  Constitutional: resting comfortably in bed; NAD  Head: NC/AT  Eyes: PERRL, EOMI, anicteric sclera  ENT: no nasal discharge; MMM  Neck: supple; no JVD or thyromegaly  Respiratory: CTA B/L; no W/R/R, no retractions  Cardiac: +S1/S2; RRR; no M/R/G  Gastrointestinal: soft, NT/ND; no rebound or guarding; +BSx4  Back: spine midline, no bony tenderness or step-offs; no CVAT B/L  Extremities: WWP, no clubbing or cyanosis; no peripheral edema. Capillary refill <2 sec  Musculoskeletal: NROM x4; no joint swelling, tenderness or erythema  Vascular: 2+ radial, DP/PT pulses B/L  Dermatologic: skin warm, dry and intact; no rashes, wounds, or scars  Lymphatic: no submandibular or cervical LAD  Neurologic: AAOx3; CNII-XII grossly intact; no focal deficits  Psychiatric: affect and characteristics of appearance, verbalizations, behaviors are appropriate    LABS:                        12.9   6.91  )-----------( 213      ( 16 Oct 2024 09:10 )             41.1     10-16    140  |  105  |  10  ----------------------------<  112[H]  3.5   |  24  |  0.75    Ca    8.7      16 Oct 2024 09:10  Phos  2.8     10-16  Mg     1.8     10-16    TPro  6.5  /  Alb  3.3  /  TBili  0.5  /  DBili  x   /  AST  20  /  ALT  29  /  AlkPhos  51  10-16        Urinalysis Basic - ( 16 Oct 2024 09:10 )    Color: x / Appearance: x / SG: x / pH: x  Gluc: 112 mg/dL / Ketone: x  / Bili: x / Urobili: x   Blood: x / Protein: x / Nitrite: x   Leuk Esterase: x / RBC: x / WBC x   Sq Epi: x / Non Sq Epi: x / Bacteria: x      CAPILLARY BLOOD GLUCOSE      POCT Blood Glucose.: 114 mg/dL (16 Oct 2024 09:19)  POCT Blood Glucose.: 125 mg/dL (15 Oct 2024 22:10)  POCT Blood Glucose.: 95 mg/dL (15 Oct 2024 17:47)  POCT Blood Glucose.: 134 mg/dL (15 Oct 2024 12:51)        Urinalysis Basic - ( 16 Oct 2024 09:10 )    Color: x / Appearance: x / SG: x / pH: x  Gluc: 112 mg/dL / Ketone: x  / Bili: x / Urobili: x   Blood: x / Protein: x / Nitrite: x   Leuk Esterase: x / RBC: x / WBC x   Sq Epi: x / Non Sq Epi: x / Bacteria: x        MEDICATIONS  (STANDING):  ascorbic acid 500 milliGRAM(s) Oral two times a day  aspirin enteric coated 81 milliGRAM(s) Oral daily  dextrose 5%. 1000 milliLiter(s) (100 mL/Hr) IV Continuous <Continuous>  dextrose 5%. 1000 milliLiter(s) (50 mL/Hr) IV Continuous <Continuous>  dextrose 50% Injectable 12.5 Gram(s) IV Push once  dextrose 50% Injectable 25 Gram(s) IV Push once  dextrose 50% Injectable 25 Gram(s) IV Push once  enoxaparin Injectable 40 milliGRAM(s) SubCutaneous every 12 hours  finasteride 5 milliGRAM(s) Oral daily  gabapentin 300 milliGRAM(s) Oral every 8 hours  glucagon  Injectable 1 milliGRAM(s) IntraMuscular once  insulin lispro (ADMELOG) corrective regimen sliding scale   SubCutaneous Before meals and at bedtime  tamsulosin 0.4 milliGRAM(s) Oral at bedtime  zinc sulfate 220 milliGRAM(s) Oral daily    MEDICATIONS  (PRN):  dextrose Oral Gel 15 Gram(s) Oral once PRN Blood Glucose LESS THAN 70 milliGRAM(s)/deciliter      RADIOLOGY & ADDITIONAL TESTS: Reviewed   Patient is a 79y old  Male who presents with a chief complaint of UTI, leg weakness (15 Oct 2024 12:32)    OVERNIGHT EVENTS: NAOE    SUBJECTIVE: Pt is doing well at bedside no new complaints at this time. Pt endorses diarrhea four times yesterday and states that he feels better overall. Pt is leaving for [facility] today at 3 pm. Patient has intact rectal tone and has no signs of saddle anesthesia. Suspect GI incontience is due to some other reason but unclear right now the exact reason.  However patient is medically ready for discharge.       ROS: otherwise negative      T(C): 36.8 (10-16-24 @ 05:42), Max: 36.8 (10-15-24 @ 15:29)  HR: 90 (10-16-24 @ 05:42) (77 - 90)  BP: 116/67 (10-16-24 @ 05:42) (116/67 - 132/77)  RR: 18 (10-16-24 @ 05:42) (18 - 18)  SpO2: 96% (10-16-24 @ 05:42) (93% - 96%)  Wt(kg): --Vital Signs Last 24 Hrs  T(C): 36.8 (16 Oct 2024 05:42), Max: 36.8 (15 Oct 2024 15:29)  T(F): 98.2 (16 Oct 2024 05:42), Max: 98.3 (15 Oct 2024 15:29)  HR: 90 (16 Oct 2024 05:42) (77 - 90)  BP: 116/67 (16 Oct 2024 05:42) (116/67 - 132/77)  BP(mean): --  RR: 18 (16 Oct 2024 05:42) (18 - 18)  SpO2: 96% (16 Oct 2024 05:42) (93% - 96%)    Parameters below as of 16 Oct 2024 05:42  Patient On (Oxygen Delivery Method): nasal cannula  O2 Flow (L/min): 2      PHYSICAL EXAM:  General: in no acute distress  Eyes: EOMI intact bilaterally. Anicteric sclerae, moist conjunctivae. Segura in place.   HENT: Moist mucous membranes  Neck: Trachea midline, supple  Lungs: CTA B/L. No wheezes, rales, or rhonchi  Cardiovascular: RRR. No murmurs, rubs, or gallops  Abdomen: mildly distended, soft. No guarding and tenderness. Patient with decreased distension compared to yesterday.  : Rectal tone intact. No saddle anesthesia.  Extremities: WWP, No clubbing, cyanosis or edema.  Neurological: Alert and oriented x3  Skin: Warm and dry. No obvious rash       LABS:                        12.9   6.91  )-----------( 213      ( 16 Oct 2024 09:10 )             41.1     10-16    140  |  105  |  10  ----------------------------<  112[H]  3.5   |  24  |  0.75    Ca    8.7      16 Oct 2024 09:10  Phos  2.8     10-16  Mg     1.8     10-16    TPro  6.5  /  Alb  3.3  /  TBili  0.5  /  DBili  x   /  AST  20  /  ALT  29  /  AlkPhos  51  10-16        Urinalysis Basic - ( 16 Oct 2024 09:10 )    Color: x / Appearance: x / SG: x / pH: x  Gluc: 112 mg/dL / Ketone: x  / Bili: x / Urobili: x   Blood: x / Protein: x / Nitrite: x   Leuk Esterase: x / RBC: x / WBC x   Sq Epi: x / Non Sq Epi: x / Bacteria: x      CAPILLARY BLOOD GLUCOSE      POCT Blood Glucose.: 114 mg/dL (16 Oct 2024 09:19)  POCT Blood Glucose.: 125 mg/dL (15 Oct 2024 22:10)  POCT Blood Glucose.: 95 mg/dL (15 Oct 2024 17:47)  POCT Blood Glucose.: 134 mg/dL (15 Oct 2024 12:51)        Urinalysis Basic - ( 16 Oct 2024 09:10 )    Color: x / Appearance: x / SG: x / pH: x  Gluc: 112 mg/dL / Ketone: x  / Bili: x / Urobili: x   Blood: x / Protein: x / Nitrite: x   Leuk Esterase: x / RBC: x / WBC x   Sq Epi: x / Non Sq Epi: x / Bacteria: x        MEDICATIONS  (STANDING):  ascorbic acid 500 milliGRAM(s) Oral two times a day  aspirin enteric coated 81 milliGRAM(s) Oral daily  dextrose 5%. 1000 milliLiter(s) (100 mL/Hr) IV Continuous <Continuous>  dextrose 5%. 1000 milliLiter(s) (50 mL/Hr) IV Continuous <Continuous>  dextrose 50% Injectable 12.5 Gram(s) IV Push once  dextrose 50% Injectable 25 Gram(s) IV Push once  dextrose 50% Injectable 25 Gram(s) IV Push once  enoxaparin Injectable 40 milliGRAM(s) SubCutaneous every 12 hours  finasteride 5 milliGRAM(s) Oral daily  gabapentin 300 milliGRAM(s) Oral every 8 hours  glucagon  Injectable 1 milliGRAM(s) IntraMuscular once  insulin lispro (ADMELOG) corrective regimen sliding scale   SubCutaneous Before meals and at bedtime  tamsulosin 0.4 milliGRAM(s) Oral at bedtime  zinc sulfate 220 milliGRAM(s) Oral daily    MEDICATIONS  (PRN):  dextrose Oral Gel 15 Gram(s) Oral once PRN Blood Glucose LESS THAN 70 milliGRAM(s)/deciliter      RADIOLOGY & ADDITIONAL TESTS: Reviewed

## 2024-10-16 NOTE — PROGRESS NOTE ADULT - PROBLEM SELECTOR PLAN 1
#Bilateral leg weakness  Chronic, seen on prior admission in Sept '24. Likely partially 2/2 chronic BPPV vs other spinal nerve compressions. Was discharged in Sept '24 w/ plan for vestibular rehab - was not able to go because of insurance issues but found a place w/ openings next week.   Weakness also possibly exacerbated iso UTI, & iso known spinal nerve compressions/stenosis.   MR Lumbar spine w/ L4-L5 spinal canal stenosis, L5-S1 nerve root compression, NO cord compression     Plan:   - reconsult Ortho given new urinary retention and persistent diarrhea   - PT rec [facility], pt not ready for dc today

## 2024-10-16 NOTE — DISCHARGE NOTE NURSING/CASE MANAGEMENT/SOCIAL WORK - NSDCPEFALRISK_GEN_ALL_CORE
For information on Fall & Injury Prevention, visit: [email or web address] OR  [email or web address] OR  https://www.cdc.gov/steadi/patient.html

## 2024-10-16 NOTE — PROGRESS NOTE ADULT - ASSESSMENT
I M    79 y o M w/ PMHx HTn, CAD, DM2, BPH, & peripheral neuropathy, lumbar spinal stenosis - p/w 1mo bilat lower ext weakness acutely worsened over last 1wk, along w/ difficulty urinating - found w/ UTI & multiple spinal nerve compressions. Admitted for further mgmt.     Nutritional Assessment:  · Nutritional Assessment	This patient has been assessed with a concern for Malnutrition and has been determined to have a diagnosis/diagnoses of Morbid obesity (BMI > 40).    This patient is being managed with:   Diet DASH/TLC-  Sodium & Cholesterol Restricted  Consistent Carbohydrate {No Snacks} (CSTCHO)  Aaron(7 Gm Arginine/7 Gm Glut/1.2 Gm HMB     Qty per Day:  2  Entered: Oct 11 2024  1:58PM    Problem/Plan - 1:  ·  Problem: Leg weakness.   ·  Plan: #Bilateral leg weakness  Chronic, seen on prior admission in Sept '24. Likely partially 2/2 chronic BPPV vs other spinal nerve compressions. Was discharged in Sept '24 w/ plan for vestibular rehab - was not able to go because of insurance issues but found a place w/ openings next week.   Weakness also possibly exacerbated iso UTI, & iso known spinal nerve compressions/stenosis.   MR Lumbar spine w/ L4-L5 spinal canal stenosis, L5-S1 nerve root compression, NO cord compression   Plan:   - PT rec RUBY  - Pt plan to attent vestibular rehab post-discharge  - Ortho consulted, to see the patient again today; rec outpatient MRI.    Problem/Plan - 2:  ·  Problem: Urinary retention.   ·  Plan: TOV.    Problem/Plan - 3:  ·  Problem: Morbid obesity.   ·  Plan: Affects all aspects of care.    Problem/Plan - 4:  ·  Problem: BPH (benign prostatic hyperplasia).   ·  Plan: Presented w/ urinary retention. UA w/LE, ++WBC, many bacteria. UCx from prior admission 9/5 w/ >100K cfu Staph Epi, s/p course IV CTX. s/p CTX 1g in ED 10/11 AM.  NO dysuria, increased frequency or infectious symptoms.      Home meds: Tamsulosin 0.4mg qd, Finasteride 5mg qd    Plan  - Despite + UA, will monitor off antibitoics given patient does not have symptoms of infection . retention likely 2/2 to h/o BPH   - Segura placed for urinary retention  - c/w home meds.    Problem/Plan - 5:  ·  Problem: DM2 (diabetes mellitus, type 2).   ·  Plan: Home meds: Weekly Ozempic injections, Metformin 500mg BID. PT states he has never required insulin. Last A1c wnl     Plan  - DASH CC diet, mISS  - hold metformin while inpatient.    Problem/Plan - 6:  ·  Problem: History of peripheral neuropathy.   ·  Plan: Iso long-standing DM. Home med: Gabapentin 300mg TID   - c/w home med.    Problem/Plan - 7:  ·  Problem: HTN (hypertension).   ·  Plan: Home meds: Spironolactone 50qd, Losartan 25qd  - Holding home meds for now iso soft BPs, restart as appropriate.    Problem/Plan - 8:  ·  Problem: CAD (coronary artery disease).   ·  Plan: Home med ASA 81mg qd  - c/w home med.    Problem/Plan - 9:  ·  Problem: Preventive measure.   ·  Plan: F: None  E: Replete PRN  N: DASH CC  P: Lovenox 40     Dispo: RMF.  
ECHOCARDIOGRAPHY REPORT



PATIENT NAME:  LOIS VANEGAS

MRN:  [id number]        [place] LLCT#:  [id number]  ROOM#:

DATE OF SERVICE:2018                             :  2018

[facility] REFERENCE #:  [id number]

PRIMARY CARE:  [facility]

ORDER #:  [id number]

INDICATION:  Followup on small ductus arteriosus seen on prior echo in

July.



REPORT



Patient weight:  12 pounds 3 ounces

Height:  26 inches



This echo shows the ductus is still open, but it is quite small.  The

chamber sizes are not abnormally enlarged.  The left ventricular systolic

performance is normal.  The left atrium does not appear enlarged.  The

aortic arch shows no coarctation.  The morphology of the four cardiac

valves is normal and the left coronary artery appears to have a normal

origin.



The Doppler velocities are normal through the danae.



Cardiac dimensions in centimeters:

LVED 2.0

LVES 1.2

LV wall 0.3

Septum 0.3

Right ventricle 1.4

Left atrium 1.3

Aortic root 0.9



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

EFFECT.



INTERPRETING PHYSICIAN: MAISHA CORRAL MD









/:  [id number]      DT:  2018 TT:  1138      ID:  [id number]

/:  [id number]      DD:  2018 TD:  0909     JOB:  [id number]



cc:[facility] LEJEUNE HANNON, DAVID MD

   [facility], PEDIATRICS M.D.

>
78yo M w/ PMHx HTn, CAD, DM2, BPH, & peripheral neuropathy, lumbar spinal stenosis - p/w 1mo bilat lower ext weakness acutely worsened over last 1wk, along w/ difficulty urinating - found w/ UTI & multiple spinal nerve compressions. Admitted for further mgmt. 
{\rtf1\mjpfai86093\ansi\jenexfv2171\ftnbj\uc1\deff0  {\fonttbl{\f0 \fnil Segoe UI;}{\f1 \fnil \fcharset0 Segoe UI;}{\f2 \fnil Times New Saran;}}  {\colortbl ;\utw111\cjetu092\gnqe811 ;\red0\green0\blue0 ;\red0\green0\mdnv784 ;\red0\green0\blue0 ;}  {\stylesheet{\f0\fs20 Normal;}{\cs1 Default Paragraph Font;}{\cs2\f0\fs16 Line Number;}{\cs3\f2\fs24\ul\cf3 Hyperlink;}}  {\*\revtbl{Unknown;}}  \hycoin45861\luakys03504\fppan4193\fhusp3683\evvxp0839\ohlso0125\pmkqyws444\sdwbhur057\nogrowautofit\yaausj793\formshade\nofeaturethrottle1\dntblnsbdb\fet4\aendnotes\aftnnrlc\pgbrdrhead\pgbrdrfoot  \sectd\xlgmau04146\wkefpm76854\guttersxn0\qfkgmspw7074\vfowrrjt3247\ymsujdwl7519\sjfvdcix9547\migrlgb931\hrsgcmg297\sbkpage\pgncont\pgndec  \plain\plain\f0\fs24\ql\plain\f0\fs24\plain\f0\fs20\uidb7794\hich\f0\dbch\f0\loch\f0\fs20\par  I M\par  \par  79 y o M w/ PMHx HTn, CAD, DM2, BPH, & peripheral neuropathy, lumbar spinal stenosis - p/w 1mo bilat lower ext weakness acutely worsened over last 1wk, along w/ difficulty urinating - found w/ UTI & multiple spinal nerve compressions. Admitted for further   mgmt. \par  \par  \plain\f1\fs20\jtww4526\hich\f1\dbch\f1\loch\f1\cf2\fs20\ul{\field{\*\fldinst HYPERLINK [id number] }{\fldrslt Nutritional Assessment:}}\plain\f0\fs20\jpxn7507\hich\f0\dbch\f0\loch\f0\fs20\ql\par  \trowd\igeyoi73\lastrow\vwxbxap34\trpaddfl3\eefxvvk19\trpaddfr3\trpaddt0\trpaddft3\trpaddb0\trpaddfb3\trleft0  \clvertalt\ygbrtz42\clpadft3\bdjalj70\clpadfr3\clpadl0\clpadfl3\clpadb0\clpadfb3\rniql9941  \clvertalt\ibauhs86\clpadft3\omimgd35\clpadfr3\clpadl0\clpadfl3\clpadb0\clpadfb3\tjvxf9333  \pard\intbl\ssparaaux0\s0\fi-120\li120\ql\plain\f0\fs24{\*\bkmkstart xw198275734753}{\*\bkmkend xg430844707906}\plain\f0\fs20\dres3405\hich\f0\dbch\f0\loch\f0\fs20 \'b7 Nutritional Assessment\cell  \pard\intbl\ssparaaux0\s0\li300\ri300\ql\plain\f0\fs24\plain\f0\fs20\qlpk8129\hich\f0\dbch\f0\loch\f0\fs20 This patient has been assessed with a concern for Malnutrition and has been determined to have a diagnosis/diagnoses of Morbid obesity (BMI > 40).\par  \par  This patient is being managed with: \par  Diet DASH/TLC-\par  Sodium & Cholesterol Restricted\par  Consistent Carbohydrate \{No Snacks\} (CSTCHO)\par  Aaron(7 Gm Arginine/7 Gm Glut/1.2 Gm HMB     Qty per Day:  2\par  Entered: Oct 11 2024  1:58PM\cell  \intbl\row  \pard\ssparaaux0\s0\ql\plain\f0\fs24\plain\f0\fs20\udlj0854\hich\f0\dbch\f0\loch\f0\fs20\par  \plain\f1\fs20\gzsy2581\hich\f1\dbch\f1\loch\f1\cf2\fs20\ul{\field{\*\fldinst HYPERLINK [id number] }{\fldrslt Problem/Plan - 1:}}\plain\f0\fs20\wrlb7853\hich\f0\dbch\f0\loch\f0\fs20\ql\par  \'b7  {\*\bkmkstart jc71959238912}{\*\bkmkend we11097362818}Problem: {\*\bkmkstart wt76779193749}{\*\bkmkend za19647136231}Leg weakness. \par  \'b7  {\*\bkmkstart cq53405461430}{\*\bkmkend ls26691757360}Plan: {\*\bkmkstart ym61927402704}{\*\bkmkend eo55898288194}#Bilateral leg weakness\par  Chronic, seen on prior admission in Sept '24. Likely partially 2/2 chronic BPPV vs other spinal nerve compressions. Was discharged in Sept '24 w/ plan for vestibular rehab - was not able to go because of insurance issues but found a place w/ openings   next week. \par  Weakness also possibly exacerbated iso UTI, & iso known spinal nerve compressions/stenosis. \par  MR Lumbar spine w/ L4-L5 spinal canal stenosis, L5-S1 nerve root compression, NO cord compression \par  \par  Plan: \par  - reconsult Ortho given new urinary retention and persistent diarrhea \par  - PT rec RUBY, pt not ready for dc today.\plain\f1\fs20\fppw8897\hich\f1\dbch\f1\loch\f1\cf2\fs20\strike\plain\f0\fs20\icyf8268\hich\f0\dbch\f0\loch\f0\fs20\par  \par  \plain\f1\fs20\thnp7608\hich\f1\dbch\f1\loch\f1\cf2\fs20\ul{\field{\*\fldinst HYPERLINK [id number] }{\fldrslt Problem/Plan - 2:}}\plain\f0\fs20\mkha0689\hich\f0\dbch\f0\loch\f0\fs20\ql\par  \'b7  {\*\bkmkstart pv82527048552}{\*\bkmkend pp91982329657}Problem: {\*\bkmkstart mp27193868816}{\*\bkmkend qj11951382172}Diarrhea.\plain\f1\fs20\pipb9008\hich\f1\dbch\f1\loch\f1\cf2\fs20\strike\plain\f0\fs20\fhcy0416\hich\f0\dbch\f0\loch\f0\fs20\par  \'b7  {\*\bkmkstart zo62700246392}{\*\bkmkend ya12282996959}Plan: {\*\bkmkstart tn34294329532}{\*\bkmkend ah76077140989}Pt with several days of persistent diarrhea. GI PCR ordered and was negative. Pt with significant ABD distension. K to 2.6 and Mag   1.7 this morning, repleted. \par  \par  KUB: Mild gaseous distention of colonic loops measuring up to 6.7 cm suggesting colonic ileus. No stool burden.\par  \par  Plan \par  - obtain C diff \par  - reconsult ortho given new symptom and prior MRI findings \par  - hold imodium until C diff result returns \par  - low threshold for NG decompression if patient begins to develop abdominal pain, worsening distension \par  - q4h abdominal exams \par  - recheck CMP, Mag, Phos after repletion is completed.\plain\f1\fs20\wjko9669\hich\f1\dbch\f1\loch\f1\cf2\fs20\strike\plain\f0\fs20\lkip7361\hich\f0\dbch\f0\loch\f0\fs20\par  \par  \plain\f1\fs20\irbj5142\hich\f1\dbch\f1\loch\f1\cf2\fs20\ul{\field{\*\fldinst HYPERLINK [id number] }{\fldrslt Problem/Plan - 3:}}\plain\f0\fs20\wbgx2196\hich\f0\dbch\f0\loch\f0\fs20\ql\par  \'b7  {\*\bkmkstart kb58478478217}{\*\bkmkend au65146161885}Problem: {\*\bkmkstart mq33784539403}{\*\bkmkend wg72022866150}Urinary retention.\plain\f1\fs20\ivyn0840\hich\f1\dbch\f1\loch\f1\cf2\fs20\strike\plain\f0\fs20\qosz5006\hich\f0\dbch\f0\loch\f0\fs20\par  \'b7  {\*\bkmkstart rp30320358603}{\*\bkmkend jn98693068621}Plan: {\*\bkmkstart vx64489888210}{\*\bkmkend le08159055940}Patient failed TOVs. Per nursing, patient has been incontinent throughout the evening of 10/12- 10/13 with bladder scans throughout   the evening with very little urine or 0mL. \par  \par  KUB: Mild gaseous distention of colonic loops measuring up to 6.7 cm suggesting colonic ileus. No stool burden.\par  \par  Plan:\par  - Failed TOV x3, felder inserted\par  - 10/10 urine culture with coag neg staph and actiomyces neuii 50-99k\par  - GI PCR neg.\plain\f1\fs20\gibf0809\hich\f1\dbch\f1\loch\f1\cf2\fs20\strike\plain\f0\fs20\cfne3847\hich\f0\dbch\f0\loch\f0\fs20\par  \par  \plain\f1\fs20\yjba2933\hich\f1\dbch\f1\loch\f1\cf2\fs20\ul{\field{\*\fldinst HYPERLINK [id number] }{\fldrslt Problem/Plan - 4:}}\plain\f0\fs20\faro4665\hich\f0\dbch\f0\loch\f0\fs20\ql\par  \'b7  {\*\bkmkstart pm02338146123}{\*\bkmkend ka37591771496}Problem: {\*\bkmkstart lp61257578786}{\*\bkmkend eg41651564022}Morbid obesity.\plain\f1\fs20\jqdq5358\hich\f1\dbch\f1\loch\f1\cf2\fs20\strike\plain\f0\fs20\zhsu5654\hich\f0\dbch\f0\loch\f0\fs20\par  \'b7  {\*\bkmkstart lc39720967724}{\*\bkmkend ba96735213001}Plan: {\*\bkmkstart wa08432110226}{\*\bkmkend tw78262357950}Affects all aspects of care.\plain\f1\fs20\pngt0748\hich\f1\dbch\f1\loch\f1\cf2\fs20\strike\plain\f0\fs20\qoyn5636\hich\f0\dbch\f0\loch\f0\fs20\par  \par  \plain\f1\fs20\dkhg9182\hich\f1\dbch\f1\loch\f1\cf2\fs20\ul{\field{\*\fldinst HYPERLINK [id number] }{\fldrslt Problem/Plan - 5:}}\plain\f0\fs20\btfq7370\hich\f0\dbch\f0\loch\f0\fs20\ql\par  \'b7  {\*\bkmkstart uk02155107717}{\*\bkmkend ij93357084044}Problem: {\*\bkmkstart dr61367180469}{\*\bkmkend uy33693564794}BPH (benign prostatic hyperplasia).\plain\f1\fs20\ufgi6029\hich\f1\dbch\f1\loch\f1\cf2\fs20\strike\plain\f0\fs20\qkwx6132\hich\f0\dbch\f0\loch\f0\fs20\par  \'b7  {\*\bkmkstart mu57975359817}{\*\bkmkend es20550650545}Plan: {\*\bkmkstart tm75753466978}{\*\bkmkend mm33538072135}Presented w/ urinary retention. UA w/LE, ++WBC, many bacteria. UCx from prior admission 9/5 w/ >100K cfu Staph Epi, s/p course IV CTX.   s/p CTX 1g in ED 10/11 AM.  NO dysuria, increased frequency or infectious symptoms. \par   \par  Home meds: Tamsulosin 0.4mg qd, Finasteride 5mg qd\par  \par  Plan\par  - Despite + UA, will monitor off antibiotics given patient does not have symptoms of infection . retention likely 2/2 to h/o BPH \par  - c/w home meds\par  - Failed TOV x3, felder inserted.\plain\f1\fs20\ynne1779\hich\f1\dbch\f1\loch\f1\cf2\fs20\strike\plain\f0\fs20\jvqs1563\hich\f0\dbch\f0\loch\f0\fs20\par  \par  \plain\f1\fs20\fvho7100\hich\f1\dbch\f1\loch\f1\cf2\fs20\ul{\field{\*\fldinst HYPERLINK [id number] }{\fldrslt Problem/Plan - 6:}}\plain\f0\fs20\khct1569\hich\f0\dbch\f0\loch\f0\fs20\ql\par  \'b7  {\*\bkmkstart oq90075379075}{\*\bkmkend vi10887181832}Problem: {\*\bkmkstart sb63170990819}{\*\bkmkend sa77419968209}DM2 (diabetes mellitus, type 2).\plain\f1\fs20\htpo4275\hich\f1\dbch\f1\loch\f1\cf2\fs20\strike\plain\f0\fs20\iqyh7606\hich\f0\dbch\f0\loch\f0\fs20\par  \'b7  {\*\bkmkstart aj91343377381}{\*\bkmkend jw88821236418}Plan: {\*\bkmkstart qi81360177671}{\*\bkmkend ev94248238713}Home meds: Weekly Ozempic injections, Metformin 500mg BID. PT states he has never required insulin. Last A1c wnl \par  \par  Plan\par  - DASH CC diet, mISS\par  - hold metformin while inpatient.\plain\f1\fs20\cplc6127\hich\f1\dbch\f1\loch\f1\cf2\fs20\strike\plain\f0\fs20\jbpq2831\hich\f0\dbch\f0\loch\f0\fs20\par  \par  \plain\f1\fs20\jlrb0657\hich\f1\dbch\f1\loch\f1\cf2\fs20\ul{\field{\*\fldinst HYPERLINK [id number] }{\fldrslt Problem/Plan - 7:}}\plain\f0\fs20\vfxm4631\hich\f0\dbch\f0\loch\f0\fs20\ql\par  \'b7  {\*\bkmkstart lo68642661003}{\*\bkmkend gj66888068328}Problem: {\*\bkmkstart wa38354832118}{\*\bkmkend va06513916774}History of peripheral neuropathy.\plain\f1\fs20\hmea2609\hich\f1\dbch\f1\loch\f1\cf2\fs20\strike\plain\f0\fs20\srcj9495\hich\f0\dbch\f0\loch\f0\fs20\par  \'b7  {\*\bkmkstart bg82219934583}{\*\bkmkend nj88357628365}Plan: {\*\bkmkstart yg11357050930}{\*\bkmkend qn79140956943}Iso long-standing DM. Home med: Gabapentin 300mg TID \par  - c/w home med.\plain\f1\fs20\ayzk0212\hich\f1\dbch\f1\loch\f1\cf2\fs20\strike\plain\f0\fs20\nseu9965\hich\f0\dbch\f0\loch\f0\fs20\par  \par  \plain\f1\fs20\dtef3598\hich\f1\dbch\f1\loch\f1\cf2\fs20\ul{\field{\*\fldinst HYPERLINK [id number] }{\fldrslt Problem/Plan - 8:}}\plain\f0\fs20\ubfs7460\hich\f0\dbch\f0\loch\f0\fs20\ql\par  \'b7  {\*\bkmkstart ur68154543565}{\*\bkmkend hh69625494264}Problem: {\*\bkmkstart ob19269556533}{\*\bkmkend jg17724983912}HTN (hypertension).\plain\f1\fs20\hkih7456\hich\f1\dbch\f1\loch\f1\cf2\fs20\strike\plain\f0\fs20\naxe6849\hich\f0\dbch\f0\loch\f0\fs20\par  \'b7  {\*\bkmkstart ik21271642257}{\*\bkmkend yu16373814343}Plan: {\*\bkmkstart mj21635147418}{\*\bkmkend gx33210892782}Home meds: Spironolactone 50qd, Losartan 25qd\par  - Holding home meds for normotensive BPs, restart as appropriate.\plain\f1\fs20\xmgl4557\hich\f1\dbch\f1\loch\f1\cf2\fs20\strike\plain\f0\fs20\gktn9678\hich\f0\dbch\f0\loch\f0\fs20\par  \par  \plain\f1\fs20\mupq2292\hich\f1\dbch\f1\loch\f1\cf2\fs20\ul{\field{\*\fldinst HYPERLINK [id number] }{\fldrslt Problem/Plan - 9:}}\plain\f0\fs20\mkpc0270\hich\f0\dbch\f0\loch\f0\fs20\ql\par  \'b7  {\*\bkmkstart if75905971437}{\*\bkmkend wz69076899246}Problem: {\*\bkmkstart bh61621408122}{\*\bkmkend pf95757534999}CAD (coronary artery disease).\plain\f1\fs20\iupp0953\hich\f1\dbch\f1\loch\f1\cf2\fs20\strike\plain\f0\fs20\qilo8952\hich\f0\dbch\f0\loch\f0\fs20\par  \'b7  {\*\bkmkstart yg19295827525}{\*\bkmkend lk69759782858}Plan: {\*\bkmkstart nx51479115903}{\*\bkmkend oh34865721495}Home med ASA 81mg qd\par  - c/w home med.\plain\f1\fs20\uimh7156\hich\f1\dbch\f1\loch\f1\cf2\fs20\strike\plain\f0\fs20\avhf9328\hich\f0\dbch\f0\loch\f0\fs20\par  \par  \plain\f1\fs20\qpvr0710\hich\f1\dbch\f1\loch\f1\cf2\fs20\ul{\field{\*\fldinst HYPERLINK [id number] }{\fldrslt Problem/Plan - 10:}}\plain\f0\fs20\kyud5732\hich\f0\dbch\f0\loch\f0\fs20\ql\par  \'b7  {\*\bkmkstart xz07209367250}{\*\bkmkend zw45653140804}Problem: {\*\bkmkstart ps57205112278}{\*\bkmkend lc05975681363}Preventive measure. \par  \'b7  {\*\bkmkstart im98508686223}{\*\bkmkend je62912940421}Plan; {\*\bkmkstart je55353378836}{\*\bkmkend sm07792453278}F: None\par  E: Replete PRN\par  N: DASH CC\par  P: Lovenox 40 \par  \par  Dispo: RMF{\*\bkmkstart bkcommentCR}{\*\bkmkend bkcommentCR}.\par  \plain\f1\fs16\ebap6843\hich\f1\dbch\f1\loch\f1\cf2\fs16\par  \plain\f0\fs20\lzam4920\hich\f0\dbch\f0\loch\f0\fs20\par  }  
80yo M w/ PMHx HTn, CAD, DM2, BPH, & peripheral neuropathy, lumbar spinal stenosis - p/w 1mo bilat lower ext weakness acutely worsened over last 1wk, along w/ difficulty urinating - found w/ UTI & multiple spinal nerve compressions. Admitted for further mgmt. 
78yo M w/ PMHx HTn, CAD, DM2, BPH, & peripheral neuropathy, lumbar spinal stenosis - p/w 1mo bilat lower ext weakness acutely worsened over last 1wk, along w/ difficulty urinating - found w/ UTI & multiple spinal nerve compressions. Admitted for further mgmt. 
80yo M w/ PMHx HTn, CAD, DM2, BPH, & peripheral neuropathy, lumbar spinal stenosis - p/w 1mo bilat lower ext weakness acutely worsened over last 1wk, along w/ difficulty urinating - found w/ UTI & multiple spinal nerve compressions. Admitted for further mgmt.

## 2024-10-16 NOTE — DISCHARGE NOTE NURSING/CASE MANAGEMENT/SOCIAL WORK - PATIENT PORTAL LINK FT
You can access the FollowMyHealth Patient Portal offered by [facility] by registering at the following website: [email or web address]. By joining [facility]’s FollowMyHealth portal, you will also be able to view your health information using other applications (apps) compatible with our system.

## 2024-10-16 NOTE — PROGRESS NOTE ADULT - PROBLEM SELECTOR PLAN 10
F: None  E: Replete PRN  N:DASH; CC  P: Lovenox 40     Dispo: RUBY
F: None  E: Replete PRN  N: DASH CC  P: Lovenox 40     Dispo: RMF

## 2024-10-16 NOTE — PROGRESS NOTE ADULT - PROBLEM SELECTOR PLAN 8
Home meds: Spironolactone 50qd, Losartan 25qd  - Holding home meds for normotensive BPs, restart as appropriate

## 2024-10-16 NOTE — CHART NOTE - NSCHARTNOTEFT_GEN_A_CORE
ADMITTING DIAGNOSIS:   Patient is a 79y old  Male who presents with a chief complaint of UTI, leg weakness (16 Oct 2024 10:53)    PAST MEDICAL & SURGICAL HISTORY:  HTN (hypertension)  Cirrhosis  CAD (coronary artery disease)  HLD (hyperlipidemia)  T2DM (type 2 diabetes mellitus)  BPH (benign prostatic hyperplasia)  No significant past surgical history    CURRENT DIET ORDER:   Diet, DASH/TLC:   Sodium & Cholesterol Restricted  Consistent Carbohydrate {Evening Snack} (CSTCHOSN) (10-16-24 @ 14:30)    PO INTAKE: Good (%) [X]  Fair (50-75%) [   ] Poor (<25%) [   ]    GI ISSUES: Pt reported diarrhea     PAIN: none reported at this time    SKIN: stage II pressure injury in between buttcheeks per RN flowsheets    10-15-24 @ 07:01  -  10-16-24 @ 07:00  --------------------------------------------------------  IN: 0 mL / OUT: 450 mL / NET: -450 mL    EDEMA: 2+ L ankle + leg edema ; 3+ R ankle + leg edema per RN flowsheets    LABS:   10-16    140  |  105  |  10  ----------------------------<  112[H]  3.5   |  24  |  0.75    Ca    8.7      16 Oct 2024 09:10  Phos  2.8     10-16  Mg     1.8     10-16    TPro  6.5  /  Alb  3.3  /  TBili  0.5  /  DBili  x   /  AST  20  /  ALT  29  /  AlkPhos  51  10-16    CAPILLARY BLOOD GLUCOSE  POCT Blood Glucose.: 134 mg/dL (16 Oct 2024 12:34)  POCT Blood Glucose.: 114 mg/dL (16 Oct 2024 09:19)  POCT Blood Glucose.: 125 mg/dL (15 Oct 2024 22:10)  POCT Blood Glucose.: 95 mg/dL (15 Oct 2024 17:47)    MEDICATIONS:  MEDICATIONS  (STANDING):  ascorbic acid 500 milliGRAM(s) Oral two times a day  aspirin enteric coated 81 milliGRAM(s) Oral daily  dextrose 5%. 1000 milliLiter(s) (100 mL/Hr) IV Continuous <Continuous>  dextrose 5%. 1000 milliLiter(s) (50 mL/Hr) IV Continuous <Continuous>  dextrose 50% Injectable 12.5 Gram(s) IV Push once  dextrose 50% Injectable 25 Gram(s) IV Push once  dextrose 50% Injectable 25 Gram(s) IV Push once  enoxaparin Injectable 40 milliGRAM(s) SubCutaneous every 12 hours  finasteride 5 milliGRAM(s) Oral daily  gabapentin 300 milliGRAM(s) Oral every 8 hours  glucagon  Injectable 1 milliGRAM(s) IntraMuscular once  insulin lispro (ADMELOG) corrective regimen sliding scale   SubCutaneous Before meals and at bedtime  tamsulosin 0.4 milliGRAM(s) Oral at bedtime  zinc sulfate 220 milliGRAM(s) Oral daily    MEDICATIONS  (PRN):  dextrose Oral Gel 15 Gram(s) Oral once PRN Blood Glucose LESS THAN 70 milliGRAM(s)/deciliter    WEIGHT: (10/11)  Height for BMI (FEET)	5 Feet  Height for BMI (INCHES)	7.5 Inch(s)  Height for BMI (CENTIMETERS)	171.45 Centimeter(s)  Weight for BMI (lbs)	286.8 lb  Weight for BMI (kg)	130.1 kg  Body Mass Index	44.2    WEIGHT CHANGE: none updated weight since admission    NUTRITION FOCUSED PHYSICAL EXAM: completed 10/11; Not Pertinent at this time    ESTIMATED ENERGY NEEDS:   Weight used for calculations	IBW  Estimated Energy Needs Weight (lbs)	166.1 lb  Estimated Energy Needs Weight (kg)	75.3 kg  Estimated Energy Needs From (von/kg)	30  Estimated Energy Needs To (von/kg)	35  Estimated Energy Needs Calculated From (von/kg)	2259  Estimated Energy Needs Calculated To (von/kg)	2635    Weight used for calculations	IBW  Estimated Protein Needs Weight (lbs)	166.1 lb  Estimated Protein Needs Weight (kg)	75.3 kg  Estimated Protein Needs From (g/kg)	1.25  Estimated Protein Needs To (g/kg)	1.5  Estimated Protein Needs Calculated From (g/kg)	94.12  Estimated Protein Needs Calculated To (g/kg)	112.95    *Using +10% ideal body weight (166.1 pounds) as pt is >120% ideal body weight and current weight likely inaccurate in setting of 2+/3+ edema. Needs adjusted for older age, pressure injury. fluid needs per team. ideal body weight: 151 pounds; % ideal body weight: 190%    SUBJECTIVE:   Per H&P: 78yo M w/ PMHx HTn, CAD, DM2, BPH, & peripheral neuropathy, lumbar spinal stenosis - p/w bilat lower ext weakness & inability to walk which has been worsening since ~1mo ago. Fell the day before presenting to ED which he states was due to weakness. Denies any presyncopal sx, LOC, head strike. Says he's been feeling tired lately but this is the first time he's fell in a while. Has been feeling dizzy w/ unsteady gait for past few weeks, but that his leg weakness has felt worse over last few days specifically. Also endorses that the hasn't felt the urge to pee, but notices that he feels bloated, is able to void partially but still feels bladder fullness.  No dysuria or hematuria that he's noticed. Denies any recent fever, chills, flank pain, bowel incontinence, or urinary incontinence.     (10/11) Met with pt this AM at bedside. Pt was seated upright and able to articulate his nutrition hx well. No known food allergies nor food intolerances reported. Pt reported good appetite and PO intake PTA, denied following any specific diet at home. Pt mentioned current good appetite - RD observed pt eating breakfast of eggs and pancakes. Pt denied chewing/swallowing difficulties and nausea/vomiting/diarrhea/constipation, stated last BM 10/10. Pt usual body weight 218 pounds (weight gain likely secondary to edema). RD reviewed Carbohydrate Consistent diet including sources of carbohydrates, portion sizes, pairing protein with carbohydrates, limiting sugar sweetened beverages in diet and the importance of consistent eating pattern to help optimize glycemic control. Nutrition focused physical exam did not reveal any overt signs of muscle or subcutaneous fat wasting at this time.     (10/16) Met with pt this AM at bedside. Pt was seated upright and able to articulate his nutrition hx well. Pt reported good appetite and adequate PO intake. Pt denied nausea/vomiting/diarrhea/constipation, however endorsed persistent diarrhea, stated last BM 10/16. RD reveiwed diarrhea nutrition therapy including foods that assist with diarrhea - additionally, pt to trial banatrol BID. Pt was accepting to RD suggestion and asking appropriate questions.     PREVIOUS NUTRITION DIAGNOSIS:  1. Increased Nutrient Needs (energy and protein) related to increased physiological demand for nutrients/wound healing as evidenced by stage II pressure injury  2. Overweight/Obesity related to excessive energy intake relative to expenditure as evidenced by BMI >40 kg/m^2    Active [X]  Resolved [   ]    IF RESOLVED, NEW PES: N/A    GOAL:  1. Pt to show improvements in skin integrity  2. Pt to avoid additional weight gain    MONITORING AND EVALUATION:   Current diet order is appropriate and is well tolerated, will continue to monitor:  - Food and nutrient intake/POs  - Nutrition related lab value, specifically POCT BG  - Weight/weight trends  - GI functions    NUTRITION RECOMMENDATIONS:   1. Continue with Consistent Carbohydrate, DASH/TLC diet  - RD will continue to monitor POCT BG   - Insulin and fluid needs per team  - Honor food preferences, as medically able  2. Recommend Aaron BID to assist with wound healing  3. Continue with micronutrients to assist with wound healing:  - Ascorbic acid 500 mg BID  - Zinc sulfate 220 mg/d x 14 days total  4. Appreciate weekly weight trends  5. Ongoing diet education  6. Consider banatrol BID to assist with diarrhea    RISK LEVEL: High [   ] Moderate [X] Low [   ]    Chrissie Pichardo RDN also available via TEAMS

## 2024-10-16 NOTE — PROGRESS NOTE ADULT - NUTRITIONAL ASSESSMENT
This patient has been assessed with a concern for Malnutrition and has been determined to have a diagnosis/diagnoses of Morbid obesity (BMI > 40).    This patient is being managed with:   Diet NPO-  Entered: Oct 15 2024 11:14AM  
This patient has been assessed with a concern for Malnutrition and has been determined to have a diagnosis/diagnoses of Morbid obesity (BMI > 40).    This patient is being managed with:   Diet Soft and Bite Sized-  Entered: Oct 16 2024  8:58AM  
This patient has been assessed with a concern for Malnutrition and has been determined to have a diagnosis/diagnoses of Morbid obesity (BMI > 40).    This patient is being managed with:   Diet DASH/TLC-  Sodium & Cholesterol Restricted  Consistent Carbohydrate {No Snacks} (CSTCHO)  Aaron(7 Gm Arginine/7 Gm Glut/1.2 Gm HMB     Qty per Day:  2  Entered: Oct 11 2024  1:58PM  

## 2024-10-16 NOTE — PROGRESS NOTE ADULT - REASON FOR ADMISSION
UTI, leg weakness

## 2024-10-16 NOTE — PROGRESS NOTE ADULT - PROBLEM SELECTOR PLAN 3
Patient failed TOVs. Per nursing, patient has been incontinent throughout the evening of 10/12- 10/13 with bladder scans throughout the evening with very little urine or 0mL.     KUB: Mild gaseous distention of colonic loops measuring up to 6.7 cm suggesting colonic ileus. No stool burden.    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

## 2024-10-16 NOTE — PROGRESS NOTE ADULT - PROBLEM SELECTOR PLAN 2
Pt with several days of persistent diarrhea. GI PCR ordered and was negative. Pt with significant ABD distension. K to 2.6 and Mag 1.7 this morning, repleted.     KUB: Mild gaseous distention of colonic loops measuring up to 6.7 cm suggesting colonic ileus. No stool burden.    Plan   - obtain C diff   - reconsult ortho given new symptom and prior MRI findings   - hold imodium until C diff result returns   - low threshold for NG decompression if patient begins to develop abdominal pain, worsening distension   - q4h abdominal exams   - recheck CMP, Mag, Phos after repletion is completed.

## 2024-10-16 NOTE — DISCHARGE NOTE NURSING/CASE MANAGEMENT/SOCIAL WORK - FINANCIAL ASSISTANCE
[facility] provides services at a reduced cost to those who are determined to be eligible through [facility]’s financial assistance program. Information regarding [facility]’s financial assistance program can be found by going to [email or web address] or by calling [phone].

## 2024-10-16 NOTE — PROGRESS NOTE ADULT - PROVIDER SPECIALTY LIST ADULT
Internal Medicine
Orthopedics
Orthopedics
Rehab Medicine
Internal Medicine
Internal Medicine
Rehab Medicine
Hospitalist
Internal Medicine
Internal Medicine

## 2024-10-16 NOTE — PROGRESS NOTE ADULT - PROBLEM SELECTOR PLAN 5
Presented w/ urinary retention. UA w/LE, ++WBC, many bacteria. UCx from prior admission 9/5 w/ >100K cfu Staph Epi, s/p course IV CTX. s/p CTX 1g in ED 10/11 AM.  NO dysuria, increased frequency or infectious symptoms.      Home meds: Tamsulosin 0.4mg qd, Finasteride 5mg qd    Plan  - Despite + UA, will monitor off antibiotics given patient does not have symptoms of infection . retention likely 2/2 to h/o BPH   - c/w home meds  - Failed TOV x3, felder inserted

## 2024-10-18 ENCOUNTER — NON-APPOINTMENT (OUTPATIENT)
Age: 79
End: 2024-10-18

## 2024-10-19 ENCOUNTER — RESULT REVIEW (OUTPATIENT)
Age: 79
End: 2024-10-19

## 2024-10-19 ENCOUNTER — INPATIENT (INPATIENT)
Facility: HOSPITAL | Age: 79
LOS: 3 days | Discharge: NURSING, REHAB OR LONG-TERM CARE | DRG: 299 | End: 2024-10-23
Attending: INTERNAL MEDICINE | Admitting: INTERNAL MEDICINE
Payer: MEDICARE

## 2024-10-19 VITALS
DIASTOLIC BLOOD PRESSURE: 74 MMHG | RESPIRATION RATE: 16 BRPM | SYSTOLIC BLOOD PRESSURE: 123 MMHG | HEIGHT: 67.5 IN | TEMPERATURE: 98 F | WEIGHT: 218.04 LBS | HEART RATE: 85 BPM | OXYGEN SATURATION: 97 %

## 2024-10-19 DIAGNOSIS — I80.10 PHLEBITIS AND THROMBOPHLEBITIS OF UNSPECIFIED FEMORAL VEIN: ICD-10-CM

## 2024-10-19 DIAGNOSIS — I26.99 OTHER PULMONARY EMBOLISM WITHOUT ACUTE COR PULMONALE: ICD-10-CM

## 2024-10-19 LAB
ANION GAP SERPL CALC-SCNC: 9 MMOL/L — SIGNIFICANT CHANGE UP (ref 5–17)
APTT BLD: 26.7 SEC — SIGNIFICANT CHANGE UP (ref 24.5–35.6)
BASOPHILS # BLD AUTO: 0.03 K/UL — SIGNIFICANT CHANGE UP (ref 0–0.2)
BASOPHILS NFR BLD AUTO: 0.5 % — SIGNIFICANT CHANGE UP (ref 0–2)
BUN SERPL-MCNC: 7 MG/DL — SIGNIFICANT CHANGE UP (ref 7–23)
CALCIUM SERPL-MCNC: 8.6 MG/DL — SIGNIFICANT CHANGE UP (ref 8.4–10.5)
CHLORIDE SERPL-SCNC: 101 MMOL/L — SIGNIFICANT CHANGE UP (ref 96–108)
CO2 SERPL-SCNC: 26 MMOL/L — SIGNIFICANT CHANGE UP (ref 22–31)
CREAT SERPL-MCNC: 0.73 MG/DL — SIGNIFICANT CHANGE UP (ref 0.5–1.3)
EGFR: 94 ML/MIN/1.73M2 — SIGNIFICANT CHANGE UP
EOSINOPHIL # BLD AUTO: 0.14 K/UL — SIGNIFICANT CHANGE UP (ref 0–0.5)
EOSINOPHIL NFR BLD AUTO: 2.2 % — SIGNIFICANT CHANGE UP (ref 0–6)
GLUCOSE SERPL-MCNC: 116 MG/DL — HIGH (ref 70–99)
HCT VFR BLD CALC: 38 % — LOW (ref 39–50)
HGB BLD-MCNC: 12.2 G/DL — LOW (ref 13–17)
IMM GRANULOCYTES NFR BLD AUTO: 0.3 % — SIGNIFICANT CHANGE UP (ref 0–0.9)
INR BLD: 1.29 — HIGH (ref 0.85–1.16)
LACTATE SERPL-SCNC: 0.9 MMOL/L — SIGNIFICANT CHANGE UP (ref 0.5–2)
LYMPHOCYTES # BLD AUTO: 1.04 K/UL — SIGNIFICANT CHANGE UP (ref 1–3.3)
LYMPHOCYTES # BLD AUTO: 16.7 % — SIGNIFICANT CHANGE UP (ref 13–44)
MAGNESIUM SERPL-MCNC: 1.6 MG/DL — SIGNIFICANT CHANGE UP (ref 1.6–2.6)
MCHC RBC-ENTMCNC: 27.9 PG — SIGNIFICANT CHANGE UP (ref 27–34)
MCHC RBC-ENTMCNC: 32.1 GM/DL — SIGNIFICANT CHANGE UP (ref 32–36)
MCV RBC AUTO: 86.8 FL — SIGNIFICANT CHANGE UP (ref 80–100)
MONOCYTES # BLD AUTO: 0.68 K/UL — SIGNIFICANT CHANGE UP (ref 0–0.9)
MONOCYTES NFR BLD AUTO: 10.9 % — SIGNIFICANT CHANGE UP (ref 2–14)
NEUTROPHILS # BLD AUTO: 4.33 K/UL — SIGNIFICANT CHANGE UP (ref 1.8–7.4)
NEUTROPHILS NFR BLD AUTO: 69.4 % — SIGNIFICANT CHANGE UP (ref 43–77)
NRBC # BLD: 0 /100 WBCS — SIGNIFICANT CHANGE UP (ref 0–0)
NT-PROBNP SERPL-SCNC: 155 PG/ML — SIGNIFICANT CHANGE UP (ref 0–300)
PLATELET # BLD AUTO: 207 K/UL — SIGNIFICANT CHANGE UP (ref 150–400)
POTASSIUM SERPL-MCNC: 3.1 MMOL/L — LOW (ref 3.5–5.3)
POTASSIUM SERPL-SCNC: 3.1 MMOL/L — LOW (ref 3.5–5.3)
PROTHROM AB SERPL-ACNC: 15 SEC — HIGH (ref 9.9–13.4)
RBC # BLD: 4.38 M/UL — SIGNIFICANT CHANGE UP (ref 4.2–5.8)
RBC # FLD: 13.3 % — SIGNIFICANT CHANGE UP (ref 10.3–14.5)
SODIUM SERPL-SCNC: 136 MMOL/L — SIGNIFICANT CHANGE UP (ref 135–145)
TROPONIN T, HIGH SENSITIVITY RESULT: 16 NG/L — SIGNIFICANT CHANGE UP (ref 0–51)
WBC # BLD: 6.24 K/UL — SIGNIFICANT CHANGE UP (ref 3.8–10.5)
WBC # FLD AUTO: 6.24 K/UL — SIGNIFICANT CHANGE UP (ref 3.8–10.5)

## 2024-10-19 PROCEDURE — 93970 EXTREMITY STUDY: CPT | Mod: 26

## 2024-10-19 PROCEDURE — 99285 EMERGENCY DEPT VISIT HI MDM: CPT

## 2024-10-19 PROCEDURE — 71275 CT ANGIOGRAPHY CHEST: CPT | Mod: 26,MC

## 2024-10-19 PROCEDURE — 93306 TTE W/DOPPLER COMPLETE: CPT | Mod: 26

## 2024-10-19 PROCEDURE — 99223 1ST HOSP IP/OBS HIGH 75: CPT

## 2024-10-19 RX ORDER — POTASSIUM CHLORIDE 10 MEQ
10 TABLET, EXTENDED RELEASE ORAL
Refills: 0 | Status: COMPLETED | OUTPATIENT
Start: 2024-10-19 | End: 2024-10-19

## 2024-10-19 RX ORDER — HEPARIN SODIUM 10000 [USP'U]/ML
4000 INJECTION INTRAVENOUS; SUBCUTANEOUS EVERY 6 HOURS
Refills: 0 | Status: DISCONTINUED | OUTPATIENT
Start: 2024-10-19 | End: 2024-10-19

## 2024-10-19 RX ORDER — APIXABAN 5 MG/1
10 TABLET, FILM COATED ORAL ONCE
Refills: 0 | Status: COMPLETED | OUTPATIENT
Start: 2024-10-19 | End: 2024-10-19

## 2024-10-19 RX ORDER — ENOXAPARIN SODIUM 40MG/0.4ML
100 SYRINGE (ML) SUBCUTANEOUS EVERY 12 HOURS
Refills: 0 | Status: DISCONTINUED | OUTPATIENT
Start: 2024-10-19 | End: 2024-10-20

## 2024-10-19 RX ORDER — ALBUTEROL 90 MCG
2 AEROSOL (GRAM) INHALATION EVERY 6 HOURS
Refills: 0 | Status: DISCONTINUED | OUTPATIENT
Start: 2024-10-19 | End: 2024-10-23

## 2024-10-19 RX ORDER — LORAZEPAM 2 MG
2 TABLET ORAL ONCE
Refills: 0 | Status: DISCONTINUED | OUTPATIENT
Start: 2024-10-19 | End: 2024-10-19

## 2024-10-19 RX ORDER — HEPARIN SODIUM 10000 [USP'U]/ML
8000 INJECTION INTRAVENOUS; SUBCUTANEOUS ONCE
Refills: 0 | Status: DISCONTINUED | OUTPATIENT
Start: 2024-10-19 | End: 2024-10-19

## 2024-10-19 RX ORDER — HEPARIN SODIUM 10000 [USP'U]/ML
8000 INJECTION INTRAVENOUS; SUBCUTANEOUS EVERY 6 HOURS
Refills: 0 | Status: DISCONTINUED | OUTPATIENT
Start: 2024-10-19 | End: 2024-10-19

## 2024-10-19 RX ORDER — TAMSULOSIN HCL 0.4 MG
0.4 CAPSULE ORAL AT BEDTIME
Refills: 0 | Status: DISCONTINUED | OUTPATIENT
Start: 2024-10-19 | End: 2024-10-23

## 2024-10-19 RX ORDER — FINASTERIDE 5 MG/1
5 TABLET, FILM COATED ORAL DAILY
Refills: 0 | Status: DISCONTINUED | OUTPATIENT
Start: 2024-10-19 | End: 2024-10-23

## 2024-10-19 RX ORDER — POTASSIUM CHLORIDE 10 MEQ
40 TABLET, EXTENDED RELEASE ORAL ONCE
Refills: 0 | Status: COMPLETED | OUTPATIENT
Start: 2024-10-19 | End: 2024-10-19

## 2024-10-19 RX ORDER — FINASTERIDE 5 MG/1
5 TABLET, FILM COATED ORAL EVERY 24 HOURS
Refills: 0 | Status: DISCONTINUED | OUTPATIENT
Start: 2024-10-19 | End: 2024-10-19

## 2024-10-19 RX ORDER — HEPARIN SODIUM 10000 [USP'U]/ML
INJECTION INTRAVENOUS; SUBCUTANEOUS
Qty: 25000 | Refills: 0 | Status: DISCONTINUED | OUTPATIENT
Start: 2024-10-19 | End: 2024-10-19

## 2024-10-19 RX ORDER — MAGNESIUM SULFATE IN 0.9% NACL 2 G/50 ML
2 INTRAVENOUS SOLUTION, PIGGYBACK (ML) INTRAVENOUS ONCE
Refills: 0 | Status: COMPLETED | OUTPATIENT
Start: 2024-10-19 | End: 2024-10-19

## 2024-10-19 RX ORDER — TAMSULOSIN HCL 0.4 MG
0.8 CAPSULE ORAL AT BEDTIME
Refills: 0 | Status: DISCONTINUED | OUTPATIENT
Start: 2024-10-19 | End: 2024-10-19

## 2024-10-19 RX ADMIN — Medication 100 MILLIEQUIVALENT(S): at 03:05

## 2024-10-19 RX ADMIN — APIXABAN 10 MILLIGRAM(S): 5 TABLET, FILM COATED ORAL at 05:06

## 2024-10-19 RX ADMIN — Medication 100 MILLIEQUIVALENT(S): at 03:00

## 2024-10-19 RX ADMIN — Medication 40 MILLIEQUIVALENT(S): at 03:02

## 2024-10-19 RX ADMIN — Medication 100 MILLIGRAM(S): at 17:28

## 2024-10-19 RX ADMIN — Medication 25 GRAM(S): at 05:06

## 2024-10-19 RX ADMIN — FINASTERIDE 5 MILLIGRAM(S): 5 TABLET, FILM COATED ORAL at 17:27

## 2024-10-19 RX ADMIN — Medication 0.4 MILLIGRAM(S): at 21:52

## 2024-10-19 NOTE — ED PROVIDER NOTE - DATE/TIME 3
Darrick Cornejo, DO       19-Oct-2024 06:39

## 2024-10-19 NOTE — CONSULT NOTE ADULT - ASSESSMENT
RECOMMENDATIONS:  - Obtain official TTE  - Obtain lactate and troponin  - Transition to Lovenox 79 year old male presented with LE swelling from NH after recent hospitalization. On presentation, patient was found to have positive DVT study, given recent addition of supplemental O2 patient was taken for CTPE and was found to have large right main pulmonary artery embolism with associated right heart strain. TTE with RV dilation and septal motion abnormality in systole and diastole. Biomarkers were negative.     RECOMMENDATIONS:  - Continue with Lovenox for now  - Given hemodynamically stable with no significant clots in the main vessels and no respiratory symptoms, no intervention needed at this time but can be considered for EOKS if symptoms arise    Case discussed with Dr. Watts and PERT team  Incomplete note until co-signed by attending

## 2024-10-19 NOTE — ED PROVIDER NOTE - NS ED ROS FT
CONSTITUTIONAL:  No weakness, fevers or chills  EYES/ENT:  No visual changes;  No vertigo or throat pain   NECK:  No pain or stiffness  RESPIRATORY:  No cough, wheezing, hemoptysis; No shortness of breath  CARDIOVASCULAR:  No chest pain or palpitations  GASTROINTESTINAL:  No abdominal or epigastric pain. No nausea, vomiting, or hematemesis; No diarrhea or constipation. No melena or hematochezia.  GENITOURINARY:  + urinary catheter, no dysuria, frequency or hematuria  MUSCULOSKELETAL:  FROM all extremities, normal strength  NEUROLOGICAL:  + numbness in lower extremities, no weakness  SKIN:  No itching, rashes

## 2024-10-19 NOTE — ED PROVIDER NOTE - PROGRESS NOTE DETAILS
will replete K and Mg +DVT on US - will give eliquis  will get CT chest ot r/o PE as pt has had hypoxia requiring supplemental O2 since recent hospital admission received call from radiology - pt with R PE, +heart strain.   will start heparin  will c/s PERT team received call from radiology - pt with R PE, +heart strain.   will start heparin  will c/s PERT team  ICU consulted received call from radiology - pt with R PE, +heart strain.   will start heparin  spoke with PERT team - recommend cards for bedside echo  ICU consulted

## 2024-10-19 NOTE — H&P ADULT - NSHPLABSRESULTS_GEN_ALL_CORE
LABS:                         12.2   6.24  )-----------( 207      ( 19 Oct 2024 01:23 )             38.0     10-19    136  |  101  |  7   ----------------------------<  116[H]  3.1[L]   |  26  |  0.73    Ca    8.6      19 Oct 2024 01:23  Mg     1.6     10-19      PT/INR - ( 19 Oct 2024 01:23 )   PT: 15.0 sec;   INR: 1.29          PTT - ( 19 Oct 2024 01:23 )  PTT:26.7 sec  Urinalysis Basic - ( 19 Oct 2024 01:23 )    Color: x / Appearance: x / SG: x / pH: x  Gluc: 116 mg/dL / Ketone: x  / Bili: x / Urobili: x   Blood: x / Protein: x / Nitrite: x   Leuk Esterase: x / RBC: x / WBC x   Sq Epi: x / Non Sq Epi: x / Bacteria: x            Lactate, Blood: 0.9 mmol/L (10-19 @ 06:58)      RADIOLOGY, EKG & ADDITIONAL TESTS: Reviewed.

## 2024-10-19 NOTE — ED PROVIDER NOTE - CLINICAL SUMMARY MEDICAL DECISION MAKING FREE TEXT BOX
This is a 78yo M w/ PMHx of HTN, CAD, DM2, BPH, peripheral neuropathy, lumbar spine stenosis, with recent 5 day hospitalization for lower extremity weakness and urinary retention discharged 3 days ago presenting from nursing home with bilateral leg swelling. Pt states left is slightly larger than right. Denies SOB, chest pain, cough, difficulty laying flat. Satting 97% on 3L NC. Exam revealing trace pitting edema bilaterally, non-tender. Differential includes DVT vs heart failure vs venous insufficiency vs prolonged sitting. Plan is for labs + This is a 78yo M w/ PMHx of HTN, CAD, DM2, BPH, peripheral neuropathy, lumbar spine stenosis, with recent 5 day hospitalization for lower extremity weakness and urinary retention discharged 3 days ago presenting from nursing home with bilateral leg swelling. Pt states left is slightly larger than right. Denies SOB, chest pain, cough, difficulty laying flat. Satting 97% on 3L NC. Exam revealing trace pitting edema bilaterally, non-tender. Differential includes DVT vs heart failure vs venous insufficiency vs prolonged sitting. Plan is for labs and lower extremity dopplers to eval for DVT. This is a 80yo M w/ PMHx of HTN, CAD, DM2, BPH, peripheral neuropathy, lumbar spine stenosis, with recent 5 day hospitalization for lower extremity weakness and urinary retention discharged 3 days ago presenting from nursing home with bilateral leg swelling. Pt states left is slightly larger than right. Denies SOB, chest pain, cough, difficulty laying flat. Satting 97% on 3L NC (was discharged with oxygen 3 days ago). Exam revealing trace pitting edema bilaterally, non-tender. Differential includes DVT vs heart failure vs venous insufficiency vs prolonged sitting. Plan is for labs and lower extremity dopplers to eval for DVT.

## 2024-10-19 NOTE — CONSULT NOTE ADULT - SUBJECTIVE AND OBJECTIVE BOX
Patient is a 79y old  Male who presents with a chief complaint of     Consult reason: PE  ED vitals: T: 98.5 HR:85 RR:16 BP:123/74 SpO2: 97% on 3L  Labs signficant: WBC wnl. Hypokalemia to 3.1. Coags generally wnl.   Imaging/EKG: CTPE w large R main PA w right heart strain. L basilar consolidation with c/f infarction.  Transverse colon dilation upto 6.3cm.  Acute DVT R peroneal vein.  Interventions: ED s/p eliquis 10mg x 1, Mg 2g, K 40meq    HPI:79 year old male with PMHx of HTN, CAD, DM2, BPH, peripheral neuropathy and lumbar spinal stenosis who was sent in from [facility] for bilateral leg swelling. Patient was discharged from the hospital 3 days prior after admission for weakness. On discharge, patient was sent to the rehabilitation with supplemental O2 (new on discharge). Patient denies any shortness of breath, pleurisy, chest pain, and palpitations. He states his breathing has always felt at baseline, but since his oxygen was low in the hospital they told him he needed it. While in the hospital and while at the rehabilitation patient with minimal mobility.  Patient is a former smoker, smoked less than 1PPD and quit >20 years ago. He endorses a normal colonoscopy within the last year.  Of note, patient was sent on O2 with no etiology for new oxygen requirements.  Seen and examined at bedside, reports feeling fairly well. Still feels weakness and reports chronic diarrhea, which had c.f ileus during prior admission. Worked up, with negative infectious testing. Patient reports breathing is okay s above.  Denies fever, chills, c/p, palpitations, SOB, abdominal pain, nausea, vomiting, or any urinary symptoms.    Allergies    No Known Allergies    Intolerances      Home Medications:  Albuterol (Eqv-ProAir HFA) 90 mcg/inh inhalation aerosol: 2 puff(s) inhaled every 6 hours as needed for  shortness of breath and/or wheezing (11 Oct 2024 04:55)  Aspirin EC 81 mg oral delayed release tablet: 1 tab(s) orally once a day (11 Oct 2024 04:55)  finasteride 5 mg oral tablet: 1 tab(s) orally once a day (at bedtime) (11 Oct 2024 04:55)  gabapentin 300 mg oral capsule: 1 cap(s) orally 3 times a day (11 Oct 2024 04:55)  metFORMIN 500 mg oral tablet: 1 tab(s) orally 2 times a day (11 Oct 2024 04:55)  Ozempic 2 mg/1.5 mL (0.25 mg or 0.5 mg dose) subcutaneous solution: 1 milligram(s) subcutaneously (11 Oct 2024 04:55)  tamsulosin 0.4 mg oral capsule: 1 cap(s) orally once a day (11 Oct 2024 04:55)      SOCIAL HX:     Former smoker, .5- 1 PPD x 20 y, quit 20ya    ETOH occasionally, maybe 2 drinks per monthb        No recreational drug use    PAST MEDICAL & SURGICAL HISTORY:  HTN (hypertension)      Cirrhosis      CAD (coronary artery disease)      HLD (hyperlipidemia)      T2DM (type 2 diabetes mellitus)      BPH (benign prostatic hyperplasia)      No significant past surgical history            PHYSICAL EXAM    ICU Vital Signs Last 24 Hrs  T(C): 37.2 (19 Oct 2024 07:57), Max: 37.2 (19 Oct 2024 07:57)  T(F): 98.9 (19 Oct 2024 07:57), Max: 98.9 (19 Oct 2024 07:57)  HR: 86 (19 Oct 2024 07:57) (75 - 86)  BP: 113/70 (19 Oct 2024 03:51) (113/70 - 123/74)  BP(mean): --  ABP: --  ABP(mean): --  RR: 18 (19 Oct 2024 03:51) (16 - 18)  SpO2: 98% (19 Oct 2024 07:57) (97% - 100%)    O2 Parameters below as of 19 Oct 2024 07:57  Patient On (Oxygen Delivery Method): nasal cannula  O2 Flow (L/min): 3    General: Not in distress, mild diaphoresis  Neck: No JVD.  Lungs: Bilateral BS, No w/r/r  Cardiovascular: RRR. No m/r/g  Gastrointestinal: Soft, Distended abdomen. Mild dullness to percussion  Musculoskeletal: No clubbing.  Moves all extremities.    Skin: WWP. No delayed cap refill.  Neurological: No motor or sensory deficit         LABS:                          12.2   6.24  )-----------( 207      ( 19 Oct 2024 01:23 )             38.0                                               10-19    136  |  101  |  7   ----------------------------<  116[H]  3.1[L]   |  26  |  0.73    Ca    8.6      19 Oct 2024 01:23  Mg     1.6     10-19        PT/INR - ( 19 Oct 2024 01:23 )   PT: 15.0 sec;   INR: 1.29          PTT - ( 19 Oct 2024 01:23 )  PTT:26.7 sec                                       Urinalysis Basic - ( 19 Oct 2024 01:23 )    Color: x / Appearance: x / SG: x / pH: x  Gluc: 116 mg/dL / Ketone: x  / Bili: x / Urobili: x   Blood: x / Protein: x / Nitrite: x   Leuk Esterase: x / RBC: x / WBC x   Sq Epi: x / Non Sq Epi: x / Bacteria: x                                                                                                                                                                                    CXR:    ECHO:    MEDICATIONS  (STANDING):  potassium chloride  10 mEq/100 mL IVPB 10 milliEquivalent(s) IV Intermittent every 1 hour    MEDICATIONS  (PRN):

## 2024-10-19 NOTE — ED ADULT TRIAGE NOTE - SPO2 (%)
Department of Anesthesiology  Postprocedure Note    Patient: Jacob Preciado  MRN: [id number]  YOB: 1982  Date of evaluation: 7/8/2024    Procedure Summary       Date: 07/08/24 Room / Location: [place] [address] / [facility]    Anesthesia Start: 1221 Anesthesia Stop: 1243    Procedure: COLONOSCOPY with polypectomy Diagnosis:       Personal history of colonic polyps      (Personal history of colonic polyps [Z86.010])    Surgeons: Mark Dumont MD Responsible Provider: Thao Nuñez MD    Anesthesia Type: MAC ASA Status: 2            Anesthesia Type: No value filed.    Declan Phase I: Declan Score: 9    Declan Phase II:      Anesthesia Post Evaluation    Patient location during evaluation: bedside  Patient participation: complete - patient participated  Level of consciousness: awake and alert  Pain score: 0  Airway patency: patent  Nausea & Vomiting: no vomiting  Cardiovascular status: blood pressure returned to baseline  Respiratory status: acceptable  Hydration status: euvolemic  Pain management: adequate    No notable events documented.  
97

## 2024-10-19 NOTE — H&P ADULT - HISTORY OF PRESENT ILLNESS
80yo M w/ PMHx HTn, CAD, DM2, BPH, & peripheral neuropathy, lumbar spinal stenosis - presenting from [facility] for bilateral leg swelling. Patient was discharged from the hospital 3 days prior after admission for weakness. On discharge, patient was sent to the rehabilitation with supplemental O2 (new on discharge). Patient denies any shortness of breath, pleurisy, chest pain, and palpitations. He states his breathing has always felt at baseline, but since his oxygen was low in the hospital they told him he needed it. While in the hospital and while at the rehabilitation patient with minimal mobility.  Patient is a former smoker, smoked less than 1PPD and quit >20 years ago. He endorses a normal colonoscopy within the last year.    In the ED:  Vitals: T: 98.5 HR:85 RR:16 BP:123/74 SpO2: 97% on 3L  Labs signficant: WBC wnl. Hypokalemia to 3.1. Coags generally wnl.   Imaging/EKG: CTPE w large R main PA w right heart strain. L basilar consolidation with c/f infarction.  Transverse colon dilation upto 6.3cm.  Acute DVT R peroneal vein.  Interventions: ED s/p eliquis 10mg x 1, Mg 2g, K 40meq  Consults: PERT and ICU

## 2024-10-19 NOTE — H&P ADULT - NSHPPHYSICALEXAM_GEN_ALL_CORE
GENERAL: NAD, lying in bed comfortably  HEAD:  Atraumatic, normocephalic  EYES: EOMI, PERRLA, conjunctiva and sclera clear  NECK: Supple, trachea midline, no JVD  HEART: Regular rate and rhythm, no murmurs, rubs, or gallops  LUNGS: Unlabored respirations.  Clear to auscultation bilaterally, no crackles, wheezing, or rhonchi  ABDOMEN: Soft, nontender, nondistended, +BS  EXTREMITIES: 2+ peripheral pulses bilaterally. No clubbing, cyanosis, or edema  NERVOUS SYSTEM:  A&Ox3, moving all extremities, no focal deficits   SKIN: No rashes or lesions GENERAL: NAD, lying in bed comfortably, obese  HEAD:  Atraumatic, normocephalic  EYES: EOMI, PERRLA, conjunctiva and sclera clear  NECK: Supple, trachea midline, no JVD  HEART: Regular rate and rhythm, no murmurs, rubs, or gallops  LUNGS: Unlabored respirations.  Clear to auscultation bilaterally, no crackles, wheezing, or rhonchi  ABDOMEN: Soft, nontender, mildly distended with tympany, +BS  EXTREMITIES: 2+ peripheral pulses bilaterally. No clubbing, cyanosis, or edema  NERVOUS SYSTEM:  A&Ox3, moving all extremities, no focal deficits   SKIN: No rashes or lesions

## 2024-10-19 NOTE — ED PROVIDER NOTE - OBJECTIVE STATEMENT
This is a 78yo M w/ PMHx of HTN, CAD, DM2, BPH, peripheral neuropathy, lumbar spinal stenosis, with recent 5 day hospitalization for lower extremity weakness and urinary retention discharged 3 days ago presenting from nursing home with bilateral leg swelling. He states both of his legs look swollen with left greater than right. He denies any pain in the legs or calf tenderness. He denies any cough, chest pain, shortness of breath, or difficulty laying flat. The staff at the nursing facility noticed his legs were swollen and sent him to the ER.

## 2024-10-19 NOTE — CONSULT NOTE ADULT - SUBJECTIVE AND OBJECTIVE BOX
PULMONARY EMBOLISM RESPONSE TEAM  INITIAL CONSULT NOTE    Patient is a 79y old M who presents with a chief complaint of leg swelling.    HPI:  79 year old male with PMHx of HTN, CAD, DM2, BPH, peripheral neuropathy and lumbar spinal stenosis who was sent in from [facility] for bilateral leg swelling. Patient was discharged from the hospital 3 days prior after admission for weakness. On discharge, patient was sent to the rehabilitation with supplemental O2 (new on discharge). Patient denies any shortness of breath, pleurisy, chest pain, and palpitations. He states his breathing has always felt at baseline, but since his oxygen was low in the hospital they told him he needed it. While in the hospital and while at the rehabilitation patient with minimal mobility.  Patient is a former smoker, smoked less than 1PPD and quit >20 years ago. He endorses a normal colonoscopy within the last year.      REVIEW OF SYSTEMS:  Constitutional: No fever, dizziness  Respiratory: No shortness of breath cough, or hemoptysis   Cardiovascular: No chest pain, palpitations  Gastrointestinal: No abdominal or epigastric pain. No nausea, vomiting or hematemesis; No diarrhea or constipation. No melena or hematochezia.  Neurological: No headaches, memory loss, loss of strength, numbness or tremors  Musculoskeletal: No joint pain or swelling; No muscle, back or extremity pain  Heme/Lymph: No easy bruising or bleeding gums    PAST MEDICAL & SURGICAL HISTORY:  HTN (hypertension)      Cirrhosis      CAD (coronary artery disease)      HLD (hyperlipidemia)      T2DM (type 2 diabetes mellitus)      BPH (benign prostatic hyperplasia)      No significant past surgical history          FAMILY HISTORY:      MEDICATIONS  (STANDING):  potassium chloride  10 mEq/100 mL IVPB 10 milliEquivalent(s) IV Intermittent every 1 hour    MEDICATIONS  (PRN):      Allergies    No Known Allergies    Intolerances        MEDICATIONS  (STANDING):  potassium chloride  10 mEq/100 mL IVPB 10 milliEquivalent(s) IV Intermittent every 1 hour      MEDICATIONS  (STANDING):  potassium chloride  10 mEq/100 mL IVPB 10 milliEquivalent(s) IV Intermittent every 1 hour    MEDICATIONS  (PRN):      ICU Vital Signs Last 24 Hrs  T(C): 36.9 (19 Oct 2024 03:51), Max: 36.9 (19 Oct 2024 01:02)  T(F): 98.5 (19 Oct 2024 03:51), Max: 98.5 (19 Oct 2024 01:02)  HR: 75 (19 Oct 2024 03:51) (75 - 85)  BP: 113/70 (19 Oct 2024 03:51) (113/70 - 123/74)  BP(mean): --  ABP: --  ABP(mean): --  RR: 18 (19 Oct 2024 03:51) (16 - 18)  SpO2: 100% (19 Oct 2024 03:51) (97% - 100%)    O2 Parameters below as of 19 Oct 2024 03:51  Patient On (Oxygen Delivery Method): nasal cannula  O2 Flow (L/min): 3        PHYSICAL EXAM:  Constitutional: Well appearing, in no acute distress  Head: NC/AT  EENT: Anicteric sclera; oropharynx clear, MMM  Neck: Supple, no appreciable JVD  Respiratory: CTA B/L; no W/R/R  Cardiovascular: +S1/S2, RRR  Gastrointestinal: Protuberant, NT/ND  Extremities: WWP; no pitting edema, clubbing or cyanosis  Vascular: 2+ radial pulses B/L  Neurological: awake and alert; TORRES      Data Review:  Biomarkers:   Troponin   : pending  NTproBNP : 155  Lactate      : pending    Imaging  CTA – Distribution of clot burden: Large right main pulmonary artery embolism with associated right heart strain.  TTE – RV assessment:  Strain [  ]   Dilation [  ]   Dysfunction [  ]   PASP=  LE US duplex: Findings consistent with acute DVT of the RIGHT peroneal vein. Evaluation of LEFT calf vein thrombosis is limited. However, within these limitations, no thrombosis detected    Clinical status:  Hemodynamic status: Stable  Vasopressor requirements: None   Oxygen requirements:  Nasal Canula [ X ]   HFNC [  ]  BiPAP  [  ]   MV [  ]   PULMONARY EMBOLISM RESPONSE TEAM  INITIAL CONSULT NOTE    Patient is a 79y old M who presents with a chief complaint of leg swelling.    HPI:  79 year old male with PMHx of HTN, CAD, DM2, BPH, peripheral neuropathy and lumbar spinal stenosis who was sent in from [facility] for bilateral leg swelling. Patient was discharged from the hospital 3 days prior after admission for weakness. On discharge, patient was sent to the rehabilitation with supplemental O2 (new on discharge). Patient denies any shortness of breath, pleurisy, chest pain, and palpitations. He states his breathing has always felt at baseline, but since his oxygen was low in the hospital they told him he needed it. While in the hospital and while at the rehabilitation patient with minimal mobility.  Patient is a former smoker, smoked less than 1PPD and quit >20 years ago. He endorses a normal colonoscopy within the last year.      REVIEW OF SYSTEMS:  Constitutional: No fever, dizziness  Respiratory: No shortness of breath cough, or hemoptysis   Cardiovascular: No chest pain, palpitations  Gastrointestinal: No abdominal or epigastric pain. No nausea, vomiting or hematemesis; No diarrhea or constipation. No melena or hematochezia.  Neurological: No headaches, memory loss, loss of strength, numbness or tremors  Musculoskeletal: No joint pain or swelling; No muscle, back or extremity pain  Heme/Lymph: No easy bruising or bleeding gums    PAST MEDICAL & SURGICAL HISTORY:  HTN (hypertension)      Cirrhosis      CAD (coronary artery disease)      HLD (hyperlipidemia)      T2DM (type 2 diabetes mellitus)      BPH (benign prostatic hyperplasia)      No significant past surgical history          FAMILY HISTORY:      MEDICATIONS  (STANDING):  potassium chloride  10 mEq/100 mL IVPB 10 milliEquivalent(s) IV Intermittent every 1 hour    MEDICATIONS  (PRN):      Allergies    No Known Allergies    Intolerances        MEDICATIONS  (STANDING):  potassium chloride  10 mEq/100 mL IVPB 10 milliEquivalent(s) IV Intermittent every 1 hour      MEDICATIONS  (STANDING):  potassium chloride  10 mEq/100 mL IVPB 10 milliEquivalent(s) IV Intermittent every 1 hour    MEDICATIONS  (PRN):      ICU Vital Signs Last 24 Hrs  T(C): 36.9 (19 Oct 2024 03:51), Max: 36.9 (19 Oct 2024 01:02)  T(F): 98.5 (19 Oct 2024 03:51), Max: 98.5 (19 Oct 2024 01:02)  HR: 75 (19 Oct 2024 03:51) (75 - 85)  BP: 113/70 (19 Oct 2024 03:51) (113/70 - 123/74)  BP(mean): --  ABP: --  ABP(mean): --  RR: 18 (19 Oct 2024 03:51) (16 - 18)  SpO2: 100% (19 Oct 2024 03:51) (97% - 100%)    O2 Parameters below as of 19 Oct 2024 03:51  Patient On (Oxygen Delivery Method): nasal cannula  O2 Flow (L/min): 3        PHYSICAL EXAM:  Constitutional: Well appearing, in no acute distress  Head: NC/AT  EENT: Anicteric sclera; oropharynx clear, MMM  Neck: Supple, no appreciable JVD  Respiratory: CTA B/L; no W/R/R  Cardiovascular: +S1/S2, RRR  Gastrointestinal: Protuberant, NT/ND  Extremities: WWP; no pitting edema, clubbing or cyanosis  Vascular: 2+ radial pulses B/L  Neurological: awake and alert; TORRES      Data Review:  Biomarkers:   Troponin   : 16  NTproBNP : 155  Lactate      : 0.9    Imaging  CTA – Distribution of clot burden: Large right main pulmonary artery embolism with associated right heart strain.  TTE – RV assessment:  Strain [  ]   Dilation [ X ]   Dysfunction [  ]   LE US duplex: Findings consistent with acute DVT of the RIGHT peroneal vein. Evaluation of LEFT calf vein thrombosis is limited. However, within these limitations, no thrombosis detected    Clinical status:  Hemodynamic status: Stable  Vasopressor requirements: None   Oxygen requirements:  Nasal Canula [ X ]   HFNC [  ]  BiPAP  [  ]   MV [  ]

## 2024-10-19 NOTE — ED ADULT NURSE NOTE - PAIN: PRESENCE, MLM
Pt comes in reporting on Thursday he was snowboarding and hit his head on the ice. Pt stating now he has a HA, ringing in his ears, and dizziness. Pt stating he was wearing a helmet, -LOC.    denies pain/discomfort (Rating = 0)

## 2024-10-19 NOTE — H&P ADULT - ASSESSMENT
NEURO  MAGALYS    CARDS  # HTN (hypertension)   Home meds: Spironolactone 50qd, Losartan 25qd  - resume as appropriate    #CAD (coronary artery disease). Home med ASA 81mg qd  - c/w home med    PULM  #Pulmonary Embolism  - Patient presenting w calf swelling, and imaged found to have a R peroneal DVT, subsequently with CTPE study with large right main pulmonary artery embolism with associated right heart   strain. Left basilar consolidation versus pulmonary infarct. Transverse colon dilation, measuring up to 6.3 cm. PESI III (Class III risk) , SPESI 1 point due to CAD. Troponin and BNP biomarkers re-assuringly within normal limits, CPES score 2 currently, pending formal TTE. S/p eliquis 10mg x 1 in ED  - PERT consulted, can be potential candidate for intervention as per PERT  - started on lovenox 100mg BID  - F/u PERT recs  - Formal TTE    GI  MAGALYS    RENAL  MAGALYS      # BPH (benign prostatic hyperplasia).   Home meds: Tamsulosin 0.4mg qd, Finasteride 5mg qd  - c/w home meds  - q6h bladder scan    ENDO  #DM2 (diabetes mellitus, type 2).    Home meds: Weekly Ozempic injections, Metformin 500mg BID. PT states he has never required insulin. Last A1c wnl   - DASH CC diet, mISS  - hold metformin while inpatient    MSK  #Bilateral leg weakness  Chronic, seen on prior admission in Sept '24. Likely partially 2/2 spinal nerve compressions and spinal canal stenosis. Was discharged in Sept '24 w/ plan for vestibular rehab - was not able to go because of insurance issues.  MR Lumbar spine w/ L4-L5 spinal canal stenosis, L5-S1 nerve root compression, NO cord compression   - has outpatient ortho follow up w/ Dr Arthur  - PT/OT while inpatient    # History of peripheral neuropathy.   Iso long-standing DM. Home med: Gabapentin 300mg TID   - c/w home med    Fluids: none  Electrolytes: Mg >2, K >4  Nutrition: NPO except meds  Prophylaxis: lovenox 100mg BID  Activity: as tolerated  GI: none  C: FC  Dispo: [facility]   NEURO  MAGALYS    CARDS  # HTN (hypertension)   Home meds: Spironolactone 50qd, Losartan 25qd  - resume as appropriate    #CAD (coronary artery disease). Home med ASA 81mg qd  - hold iso potentially going for intervention, re-start after    PULM  #Pulmonary Embolism  - Patient presenting w calf swelling, and imaged found to have a R peroneal DVT, subsequently with CTPE study with large right main pulmonary artery embolism with associated right heart   strain. Left basilar consolidation versus pulmonary infarct. Transverse colon dilation, measuring up to 6.3 cm. PESI III (Class III risk) , SPESI 1 point due to CAD. Troponin and BNP biomarkers re-assuringly within normal limits, CPES score 2 currently, pending formal TTE. S/p eliquis 10mg x 1 in ED  - PERT consulted, can be potential candidate for intervention as per PERT  - started on lovenox 100mg BID  - F/u PERT recs  - f/u Formal TTE    GI  #Diarrhea  Had loose stools in the ED, known history of fecal incontinence; on last admission ruled out infectious causesl negative GIPCR and negative c diff. + abdominal distension. last admission KUB suggestive of possible colonic ileus but no stool burden and patient is moving bowels. likely due to fecal incontinence and non infectious.  -continue to monitor electrolytes and replete as needed   -CTM diarrheal output with hydration     RENAL  MAGALYS      # BPH (benign prostatic hyperplasia).   Home meds: Tamsulosin 0.4mg qd, Finasteride 5mg qd  - c/w home meds  - q6h bladder scan    ENDO  #DM2 (diabetes mellitus, type 2).    Home meds: Weekly Ozempic injections, Metformin 500mg BID. PT states he has never required insulin. Last A1c wnl   - DASH CC diet, mISS  - hold metformin while inpatient    MSK  #Bilateral leg weakness  Chronic, seen on prior admission in Sept '24. Likely partially 2/2 spinal nerve compressions and spinal canal stenosis. Was discharged in Sept '24 w/ plan for vestibular rehab - was not able to go because of insurance issues.  On last admission MR Lumbar spine w/ L4-L5 spinal canal stenosis, L5-S1 nerve root compression, NO cord compression   - has outpatient ortho follow up w/ Dr Arthur  - PT/OT while inpatient    # History of peripheral neuropathy.   Iso long-standing DM. Home med: Gabapentin 300mg TID   - c/w home med    Fluids: none  Electrolytes: Mg >2, K >4  Nutrition: NPO except meds  Prophylaxis: lovenox 100mg BID  Activity: as tolerated  GI: none  C: FC  Dispo: [facility]

## 2024-10-19 NOTE — ED PROVIDER NOTE - ATTENDING CONTRIBUTION TO CARE
79M hx htn, dm, high chol, cad, c/o leg swelling. pt was recently admitted for urinary retention and leg weakness. was discharged to [facility] recently with supplemental nasal O2. pt denies any leg pain. no fevers, no SOB, no cough, no vomiting.   gen- nad  heent- ncat, clear conj  cv -rrr  lungs -ctab  abd - soft, nt, nd  ext -wwp, trace edema b/l, compartments soft, no erythema, 1+dp b/l  neuro -alert, awake, garcia  b/l LE swelling, labs checked, pending US to r/o DVT

## 2024-10-19 NOTE — CONSULT NOTE ADULT - ATTENDING COMMENTS
Patient is a 79-year-old male with history of coronary artery disease, diabetes, peripheral neuropathy with right peroneal vein DVT and large right main pulmonary artery embolism, without any respiratory symptoms, negative BNP and Trop, echo with IVS flattening & dilated RV, intermediate low risk per ESC criteria, multidisciplinary discussion ongoing for possible intervention, on Lovenox, hemodynamically stable at present.

## 2024-10-19 NOTE — CONSULT NOTE ADULT - ASSESSMENT
79 year old male with PMHx of HTN, CAD, DM2, BPH, peripheral neuropathy and lumbar spinal stenosis who was sent in from [facility] for bilateral leg swelling. ICU consulted for Pulmonary embolism w right heart strain noted on CTPE imaging.    #Pulmonary Embolism  - Patient presenting w calf swelling, and imaged found to have a R peroneal DVT, subsequently with CTPE study with large right main pulmonary artery embolism with associated right heart   strain. Left basilar consolidation versus pulmonary infarct. Transverse colon dilation, measuring up to 6.3 cm.  - PESI III (Class III risk) , SPESI 1 point due to CAD  - Pending troponin for CPES score  - Patient is warm well perfused, w minimal complaints. S/p eliquis 10mg x 1 in ED  - PERT consulted, ________  - Recommend troponin, CKMB, CK  - Formal TTE    Recommendations not final until attending attestation 79 year old male with PMHx of HTN, CAD, DM2, BPH, peripheral neuropathy and lumbar spinal stenosis who was sent in from [facility] for bilateral leg swelling. ICU consulted for Pulmonary embolism w right heart strain noted on CTPE imaging.    #Pulmonary Embolism  - Patient presenting w calf swelling, and imaged found to have a R peroneal DVT, subsequently with CTPE study with large right main pulmonary artery embolism with associated right heart   strain. Left basilar consolidation versus pulmonary infarct. Transverse colon dilation, measuring up to 6.3 cm.  - PESI III (Class III risk) , SPESI 1 point due to CAD  - Troponin and BNP biomarkers re-assuringly within normal limits, CPES score 2 currently, pending formal TTE.  - Patient is warm well perfused, w minimal complaints. S/p eliquis 10mg x 1 in ED  - PERT consulted, ________  - Recommend CKMB, CK  - Formal TTE    Recommendations not final until attending attestation 79 year old male with PMHx of HTN, CAD, DM2, BPH, peripheral neuropathy and lumbar spinal stenosis who was sent in from [facility] for bilateral leg swelling. ICU consulted for Pulmonary embolism w right heart strain noted on CTPE imaging.    #Pulmonary Embolism  - Patient presenting w calf swelling, and imaged found to have a R peroneal DVT, subsequently with CTPE study with large right main pulmonary artery embolism with associated right heart   strain. Left basilar consolidation versus pulmonary infarct. Transverse colon dilation, measuring up to 6.3 cm.  - PESI III (Class III risk) , SPESI 1 point due to CAD  - Troponin and BNP biomarkers re-assuringly within normal limits, CPES score 2 currently, pending formal TTE.  - Patient is warm well perfused, w minimal complaints. S/p eliquis 10mg x 1 in ED  - PERT consulted, can be potential candidate for intervention as per PERT  - Recommend lovenox 1mg/kg BID  - F/u PERT recs  - Recommend CKMB, CK  - Formal TTE    Recommendations not final until attending attestation

## 2024-10-19 NOTE — ED ADULT TRIAGE NOTE - CHIEF COMPLAINT QUOTE
sent from [facility] for swelling to right leg and  weakness to both legs for 5 days; denies shortness of breath or pain

## 2024-10-19 NOTE — ED PROVIDER NOTE - PHYSICAL EXAMINATION
T(C): 36.9 (10-19-24 @ 01:02), Max: 36.9 (10-19-24 @ 01:02)  HR: 85 (10-19-24 @ 01:02) (85 - 85)  BP: 123/74 (10-19-24 @ 01:02) (123/74 - 123/74)  RR: 16 (10-19-24 @ 01:02) (16 - 16)  SpO2: 97% (10-19-24 @ 01:02) (97% - 97%)    CONSTITUTIONAL: no apparent distress  EYES: PERRLA and symmetric, EOMI, no conjunctival or scleral injection, non-icteric  ENMT: Oral mucosa with moist membranes  NECK: Supple, symmetric and without tracheal deviation   RESP: No respiratory distress, no use of accessory muscles; CTA bilaterally anteriorly  CV: RRR, +S1S2, no MRG; no JVD, trace pitting edema of lower extremities  GI: Soft, NT, ND, no rebound, no guarding; no palpable masses  : Segura catheter in place  LYMPH: No cervical LAD or tenderness  MSK: normal ROM of all joints without pain, normal muscle strength/tone  Extremities: bilateral lower extremities with trace pitting edema, non-tender, nonerythematous, warm  SKIN: No rashes or ulcers noted  NEURO: CN II-XII intact; sensation intact in upper, numbness in feet bilaterally  PSYCH: Appropriate insight/judgment; A+O x 3, mood and affect appropriate, recent/remote memory intact

## 2024-10-19 NOTE — PATIENT PROFILE ADULT - FALL HARM RISK - HARM RISK INTERVENTIONS
Assistance with ambulation/Assistance OOB with selected safe patient handling equipment/Communicate Risk of Fall with Harm to all staff/Monitor gait and stability/Reinforce activity limits and safety measures with patient and family/Sit up slowly, dangle for a short time, stand at bedside before walking/Tailored Fall Risk Interventions/Visual Cue: Yellow wristband and red socks/Bed in lowest position, wheels locked, appropriate side rails in place/Call bell, personal items and telephone in reach/Instruct patient to call for assistance before getting out of bed or chair/Non-slip footwear when patient is out of bed/[place] to call system/Physically safe environment - no spills, clutter or unnecessary equipment/Purposeful Proactive Rounding/Room/bathroom lighting operational, light cord in reach

## 2024-10-19 NOTE — ED ADULT NURSE NOTE - FINAL NURSING ELECTRONIC SIGNATURE
Called and requested refill(s): patient  Medications: Viagra  Amount: 30 day supply  Pharmacy to be sent to: [facility] [address]  Call back number: [phone]  Okay to leave detailed voice message: yes      
done
19-Oct-2024 09:28

## 2024-10-20 DIAGNOSIS — K56.7 ILEUS, UNSPECIFIED: ICD-10-CM

## 2024-10-20 DIAGNOSIS — R29.898 OTHER SYMPTOMS AND SIGNS INVOLVING THE MUSCULOSKELETAL SYSTEM: ICD-10-CM

## 2024-10-20 DIAGNOSIS — I10 ESSENTIAL (PRIMARY) HYPERTENSION: ICD-10-CM

## 2024-10-20 DIAGNOSIS — Z29.9 ENCOUNTER FOR PROPHYLACTIC MEASURES, UNSPECIFIED: ICD-10-CM

## 2024-10-20 DIAGNOSIS — E11.9 TYPE 2 DIABETES MELLITUS WITHOUT COMPLICATIONS: ICD-10-CM

## 2024-10-20 DIAGNOSIS — R19.7 DIARRHEA, UNSPECIFIED: ICD-10-CM

## 2024-10-20 DIAGNOSIS — N40.0 BENIGN PROSTATIC HYPERPLASIA WITHOUT LOWER URINARY TRACT SYMPTOMS: ICD-10-CM

## 2024-10-20 DIAGNOSIS — I25.10 ATHEROSCLEROTIC HEART DISEASE OF NATIVE CORONARY ARTERY WITHOUT ANGINA PECTORIS: ICD-10-CM

## 2024-10-20 LAB
ANION GAP SERPL CALC-SCNC: 7 MMOL/L — SIGNIFICANT CHANGE UP (ref 5–17)
ANION GAP SERPL CALC-SCNC: 9 MMOL/L — SIGNIFICANT CHANGE UP (ref 5–17)
APTT BLD: 35.6 SEC — SIGNIFICANT CHANGE UP (ref 24.5–35.6)
BASOPHILS # BLD AUTO: 0.06 K/UL — SIGNIFICANT CHANGE UP (ref 0–0.2)
BASOPHILS NFR BLD AUTO: 1.2 % — SIGNIFICANT CHANGE UP (ref 0–2)
BLD GP AB SCN SERPL QL: NEGATIVE — SIGNIFICANT CHANGE UP
BUN SERPL-MCNC: 7 MG/DL — SIGNIFICANT CHANGE UP (ref 7–23)
BUN SERPL-MCNC: 8 MG/DL — SIGNIFICANT CHANGE UP (ref 7–23)
CALCIUM SERPL-MCNC: 8.8 MG/DL — SIGNIFICANT CHANGE UP (ref 8.4–10.5)
CALCIUM SERPL-MCNC: 8.9 MG/DL — SIGNIFICANT CHANGE UP (ref 8.4–10.5)
CHLORIDE SERPL-SCNC: 104 MMOL/L — SIGNIFICANT CHANGE UP (ref 96–108)
CHLORIDE SERPL-SCNC: 105 MMOL/L — SIGNIFICANT CHANGE UP (ref 96–108)
CO2 SERPL-SCNC: 27 MMOL/L — SIGNIFICANT CHANGE UP (ref 22–31)
CO2 SERPL-SCNC: 30 MMOL/L — SIGNIFICANT CHANGE UP (ref 22–31)
CREAT SERPL-MCNC: 0.68 MG/DL — SIGNIFICANT CHANGE UP (ref 0.5–1.3)
CREAT SERPL-MCNC: 0.71 MG/DL — SIGNIFICANT CHANGE UP (ref 0.5–1.3)
EGFR: 94 ML/MIN/1.73M2 — SIGNIFICANT CHANGE UP
EGFR: 96 ML/MIN/1.73M2 — SIGNIFICANT CHANGE UP
EOSINOPHIL # BLD AUTO: 0.16 K/UL — SIGNIFICANT CHANGE UP (ref 0–0.5)
EOSINOPHIL NFR BLD AUTO: 3.3 % — SIGNIFICANT CHANGE UP (ref 0–6)
GLUCOSE SERPL-MCNC: 109 MG/DL — HIGH (ref 70–99)
GLUCOSE SERPL-MCNC: 75 MG/DL — SIGNIFICANT CHANGE UP (ref 70–99)
HCT VFR BLD CALC: 39.9 % — SIGNIFICANT CHANGE UP (ref 39–50)
HGB BLD-MCNC: 12.5 G/DL — LOW (ref 13–17)
IMM GRANULOCYTES NFR BLD AUTO: 0.4 % — SIGNIFICANT CHANGE UP (ref 0–0.9)
INR BLD: 1.32 — HIGH (ref 0.85–1.16)
LYMPHOCYTES # BLD AUTO: 0.68 K/UL — LOW (ref 1–3.3)
LYMPHOCYTES # BLD AUTO: 13.9 % — SIGNIFICANT CHANGE UP (ref 13–44)
MAGNESIUM SERPL-MCNC: 1.7 MG/DL — SIGNIFICANT CHANGE UP (ref 1.6–2.6)
MAGNESIUM SERPL-MCNC: 1.8 MG/DL — SIGNIFICANT CHANGE UP (ref 1.6–2.6)
MCHC RBC-ENTMCNC: 28.8 PG — SIGNIFICANT CHANGE UP (ref 27–34)
MCHC RBC-ENTMCNC: 31.3 GM/DL — LOW (ref 32–36)
MCV RBC AUTO: 91.9 FL — SIGNIFICANT CHANGE UP (ref 80–100)
MONOCYTES # BLD AUTO: 0.49 K/UL — SIGNIFICANT CHANGE UP (ref 0–0.9)
MONOCYTES NFR BLD AUTO: 10 % — SIGNIFICANT CHANGE UP (ref 2–14)
NEUTROPHILS # BLD AUTO: 3.47 K/UL — SIGNIFICANT CHANGE UP (ref 1.8–7.4)
NEUTROPHILS NFR BLD AUTO: 71.2 % — SIGNIFICANT CHANGE UP (ref 43–77)
NRBC # BLD: 0 /100 WBCS — SIGNIFICANT CHANGE UP (ref 0–0)
PHOSPHATE SERPL-MCNC: 2.1 MG/DL — LOW (ref 2.5–4.5)
PHOSPHATE SERPL-MCNC: 3.2 MG/DL — SIGNIFICANT CHANGE UP (ref 2.5–4.5)
PLATELET # BLD AUTO: 206 K/UL — SIGNIFICANT CHANGE UP (ref 150–400)
POTASSIUM SERPL-MCNC: 3.3 MMOL/L — LOW (ref 3.5–5.3)
POTASSIUM SERPL-MCNC: 3.4 MMOL/L — LOW (ref 3.5–5.3)
POTASSIUM SERPL-SCNC: 3.3 MMOL/L — LOW (ref 3.5–5.3)
POTASSIUM SERPL-SCNC: 3.4 MMOL/L — LOW (ref 3.5–5.3)
PROTHROM AB SERPL-ACNC: 15.4 SEC — HIGH (ref 9.9–13.4)
RBC # BLD: 4.34 M/UL — SIGNIFICANT CHANGE UP (ref 4.2–5.8)
RBC # FLD: 13.4 % — SIGNIFICANT CHANGE UP (ref 10.3–14.5)
RH IG SCN BLD-IMP: POSITIVE — SIGNIFICANT CHANGE UP
SODIUM SERPL-SCNC: 141 MMOL/L — SIGNIFICANT CHANGE UP (ref 135–145)
SODIUM SERPL-SCNC: 141 MMOL/L — SIGNIFICANT CHANGE UP (ref 135–145)
WBC # BLD: 4.88 K/UL — SIGNIFICANT CHANGE UP (ref 3.8–10.5)
WBC # FLD AUTO: 4.88 K/UL — SIGNIFICANT CHANGE UP (ref 3.8–10.5)

## 2024-10-20 PROCEDURE — 99233 SBSQ HOSP IP/OBS HIGH 50: CPT | Mod: GC

## 2024-10-20 PROCEDURE — 74018 RADEX ABDOMEN 1 VIEW: CPT | Mod: 26,59

## 2024-10-20 PROCEDURE — 99233 SBSQ HOSP IP/OBS HIGH 50: CPT

## 2024-10-20 PROCEDURE — 99223 1ST HOSP IP/OBS HIGH 75: CPT

## 2024-10-20 PROCEDURE — 74019 RADEX ABDOMEN 2 VIEWS: CPT | Mod: 26

## 2024-10-20 RX ORDER — INSULIN LISPRO 100/ML
VIAL (ML) SUBCUTANEOUS EVERY 6 HOURS
Refills: 0 | Status: DISCONTINUED | OUTPATIENT
Start: 2024-10-20 | End: 2024-10-23

## 2024-10-20 RX ORDER — IOHEXOL 300 MG/ML
30 INJECTION, SOLUTION INTRAVENOUS ONCE
Refills: 0 | Status: COMPLETED | OUTPATIENT
Start: 2024-10-20 | End: 2024-10-20

## 2024-10-20 RX ORDER — POTASSIUM CHLORIDE 10 MEQ
40 TABLET, EXTENDED RELEASE ORAL ONCE
Refills: 0 | Status: COMPLETED | OUTPATIENT
Start: 2024-10-20 | End: 2024-10-20

## 2024-10-20 RX ORDER — GLUCAGON INJECTION, SOLUTION 1 MG/.2ML
1 INJECTION, SOLUTION SUBCUTANEOUS ONCE
Refills: 0 | Status: DISCONTINUED | OUTPATIENT
Start: 2024-10-20 | End: 2024-10-23

## 2024-10-20 RX ORDER — ASPIRIN/MAG CARB/ALUMINUM AMIN 325 MG
81 TABLET ORAL DAILY
Refills: 0 | Status: DISCONTINUED | OUTPATIENT
Start: 2024-10-20 | End: 2024-10-23

## 2024-10-20 RX ORDER — GABAPENTIN 300 MG/1
300 CAPSULE ORAL EVERY 8 HOURS
Refills: 0 | Status: DISCONTINUED | OUTPATIENT
Start: 2024-10-20 | End: 2024-10-23

## 2024-10-20 RX ORDER — ACETAMINOPHEN 500 MG
650 TABLET ORAL EVERY 6 HOURS
Refills: 0 | Status: DISCONTINUED | OUTPATIENT
Start: 2024-10-20 | End: 2024-10-23

## 2024-10-20 RX ORDER — APIXABAN 5 MG/1
10 TABLET, FILM COATED ORAL EVERY 12 HOURS
Refills: 0 | Status: DISCONTINUED | OUTPATIENT
Start: 2024-10-20 | End: 2024-10-23

## 2024-10-20 RX ADMIN — Medication 40 MILLIEQUIVALENT(S): at 13:05

## 2024-10-20 RX ADMIN — FINASTERIDE 5 MILLIGRAM(S): 5 TABLET, FILM COATED ORAL at 12:49

## 2024-10-20 RX ADMIN — GABAPENTIN 300 MILLIGRAM(S): 300 CAPSULE ORAL at 21:30

## 2024-10-20 RX ADMIN — Medication 81 MILLIGRAM(S): at 13:11

## 2024-10-20 RX ADMIN — IOHEXOL 30 MILLILITER(S): 300 INJECTION, SOLUTION INTRAVENOUS at 21:31

## 2024-10-20 RX ADMIN — Medication 0.4 MILLIGRAM(S): at 21:29

## 2024-10-20 RX ADMIN — APIXABAN 10 MILLIGRAM(S): 5 TABLET, FILM COATED ORAL at 17:34

## 2024-10-20 RX ADMIN — Medication 100 MILLIGRAM(S): at 06:45

## 2024-10-20 NOTE — PROGRESS NOTE ADULT - SUBJECTIVE AND OBJECTIVE BOX
** INCOMPLETE **    HOSPITAL COURSE:    OVERNIGHT EVENTS:     SUBJECTIVE:  The patient was seen and examined at the bedside this AM.     VITAL SIGNS:  Vital Signs Last 12 Hrs  T(F): 98.1 (10-20-24 @ 06:18), Max: 98.1 (10-19-24 @ 19:25)  HR: 74 (10-20-24 @ 06:09) (71 - 75)  BP: 136/70 (10-20-24 @ 06:09) (125/64 - 136/70)  BP(mean): 97 (10-20-24 @ 06:09) (91 - 97)  RR: 19 (10-20-24 @ 06:09) (19 - 20)  SpO2: 99% (10-20-24 @ 06:09) (96% - 99%)    I&O's Summary    19 Oct 2024 07:01  -  20 Oct 2024 06:41  --------------------------------------------------------  IN: 0 mL / OUT: 400 mL / NET: -400 mL        PHYSICAL EXAM:  GEN: Resting comfortably in NAD  ENMT: Moist oral mucosa; no conjunctival injection  RESP: No respiratory distress, no use of accessory muscles; CTA b/l, no WRR  CV: RRR, +S1S2, no MRG; no JVD; no peripheral edema  2+ radial, DP, PT   GI: Soft, NT, ND, no rebound, no guarding  : Segura in place draining clear urine  MSK: No digital clubbing or cyanosis  SKIN: No rashes or ulcers noted  NEURO: Answers questions appropriately, moving all extremities spontaneously    MEDICATIONS:  MEDICATIONS  (STANDING):  enoxaparin Injectable 100 milliGRAM(s) SubCutaneous every 12 hours  finasteride 5 milliGRAM(s) Oral daily  tamsulosin 0.4 milliGRAM(s) Oral at bedtime    MEDICATIONS  (PRN):  albuterol    90 MICROgram(s) HFA Inhaler 2 Puff(s) Inhalation every 6 hours PRN for shortness of breath and/or wheezing      ALLERGIES:  Allergies    No Known Allergies    Intolerances        LABS:                        12.2   6.24  )-----------( 207      ( 19 Oct 2024 01:23 )             38.0     10-19    136  |  101  |  7   ----------------------------<  116[H]  3.1[L]   |  26  |  0.73    Ca    8.6      19 Oct 2024 01:23  Mg     1.6     10-19      PT/INR - ( 19 Oct 2024 01:23 )   PT: 15.0 sec;   INR: 1.29          PTT - ( 19 Oct 2024 01:23 )  PTT:26.7 sec    RADIOLOGY & ADDITIONAL TESTS: Reviewed. OVERNIGHT EVENTS:   11P , 5A bs 300, straight cath x1    SUBJECTIVE:  The patient was seen and examined at the bedside this AM.     VITAL SIGNS:  Vital Signs Last 12 Hrs  T(F): 98.1 (10-20-24 @ 06:18), Max: 98.1 (10-19-24 @ 19:25)  HR: 74 (10-20-24 @ 06:09) (71 - 75)  BP: 136/70 (10-20-24 @ 06:09) (125/64 - 136/70)  BP(mean): 97 (10-20-24 @ 06:09) (91 - 97)  RR: 19 (10-20-24 @ 06:09) (19 - 20)  SpO2: 99% (10-20-24 @ 06:09) (96% - 99%)    I&O's Summary    19 Oct 2024 07:01  -  20 Oct 2024 06:41  --------------------------------------------------------  IN: 0 mL / OUT: 400 mL / NET: -400 mL        PHYSICAL EXAM:  GEN: Resting comfortably in NAD  ENMT: Moist oral mucosa; no conjunctival injection  RESP: No respiratory distress, no use of accessory muscles; CTA b/l, no WRR  CV: RRR, +S1S2, no MRG; no JVD; no peripheral edema  2+ radial, DP, PT   GI: Soft, NT, ND, no rebound, no guarding  : Segura in place draining clear urine  MSK: No digital clubbing or cyanosis  SKIN: No rashes or ulcers noted  NEURO: Answers questions appropriately, moving all extremities spontaneously    MEDICATIONS:  MEDICATIONS  (STANDING):  enoxaparin Injectable 100 milliGRAM(s) SubCutaneous every 12 hours  finasteride 5 milliGRAM(s) Oral daily  tamsulosin 0.4 milliGRAM(s) Oral at bedtime    MEDICATIONS  (PRN):  albuterol    90 MICROgram(s) HFA Inhaler 2 Puff(s) Inhalation every 6 hours PRN for shortness of breath and/or wheezing      ALLERGIES:  Allergies    No Known Allergies    Intolerances        LABS:                        12.2   6.24  )-----------( 207      ( 19 Oct 2024 01:23 )             38.0     10-19    136  |  101  |  7   ----------------------------<  116[H]  3.1[L]   |  26  |  0.73    Ca    8.6      19 Oct 2024 01:23  Mg     1.6     10-19      PT/INR - ( 19 Oct 2024 01:23 )   PT: 15.0 sec;   INR: 1.29          PTT - ( 19 Oct 2024 01:23 )  PTT:26.7 sec    RADIOLOGY & ADDITIONAL TESTS: Reviewed. OVERNIGHT EVENTS:   11P , 5A bs 300, straight cath x1    SUBJECTIVE:  The patient was seen and examined at the bedside this AM. No acute complaints, was feeling well without any chest pain or shortness of breath. Otherwise denies fevers, chills, chest pain, palpitations, abdominal pain, nausea, vomiting. Continues to have some diarrhea, 2-3x episodes per day for the last 10 days.    VITAL SIGNS:  Vital Signs Last 12 Hrs  T(F): 98.1 (10-20-24 @ 06:18), Max: 98.1 (10-19-24 @ 19:25)  HR: 74 (10-20-24 @ 06:09) (71 - 75)  BP: 136/70 (10-20-24 @ 06:09) (125/64 - 136/70)  BP(mean): 97 (10-20-24 @ 06:09) (91 - 97)  RR: 19 (10-20-24 @ 06:09) (19 - 20)  SpO2: 99% (10-20-24 @ 06:09) (96% - 99%)    I&O's Summary    19 Oct 2024 07:01  -  20 Oct 2024 06:41  --------------------------------------------------------  IN: 0 mL / OUT: 400 mL / NET: -400 mL      PHYSICAL EXAM:  GEN: Resting comfortably in NAD  ENMT: Moist oral mucosa; no conjunctival injection  RESP: No respiratory distress, no use of accessory muscles; CTA b/l, no WRR  CV: RRR, +S1S2, no MRG; no JVD; no peripheral edema  2+ radial, DP, PT   GI: Soft, NT, ND, no rebound, no guarding  MSK: No digital clubbing or cyanosis  SKIN: No rashes or ulcers noted  NEURO: Answers questions appropriately, moving all extremities spontaneously    MEDICATIONS:  MEDICATIONS  (STANDING):  enoxaparin Injectable 100 milliGRAM(s) SubCutaneous every 12 hours  finasteride 5 milliGRAM(s) Oral daily  tamsulosin 0.4 milliGRAM(s) Oral at bedtime    MEDICATIONS  (PRN):  albuterol    90 MICROgram(s) HFA Inhaler 2 Puff(s) Inhalation every 6 hours PRN for shortness of breath and/or wheezing      ALLERGIES:  Allergies    No Known Allergies    Intolerances        LABS:                        12.2   6.24  )-----------( 207      ( 19 Oct 2024 01:23 )             38.0     10-19    136  |  101  |  7   ----------------------------<  116[H]  3.1[L]   |  26  |  0.73    Ca    8.6      19 Oct 2024 01:23  Mg     1.6     10-19      PT/INR - ( 19 Oct 2024 01:23 )   PT: 15.0 sec;   INR: 1.29          PTT - ( 19 Oct 2024 01:23 )  PTT:26.7 sec    RADIOLOGY & ADDITIONAL TESTS: Reviewed. HOSPITAL COURSE:  80yo M w/ PMHx HTn, CAD, DM2, BPH, & peripheral neuropathy, lumbar spinal stenosis originally presented from [facility] for bilateral leg swelling. Patient was recently discharged from hospital 3 days prior to admission, previously admitted for lower extremity weakness with    - presenting from [facility] for bilateral leg swelling. Patient was discharged from the hospital 3 days prior after admission for weakness. On discharge, patient was sent to the rehabilitation with supplemental O2 (new on discharge).     79 year old male with PMHx of HTN, CAD, DM2, BPH, peripheral neuropathy and lumbar spinal stenosis who was sent in from [facility] for bilateral leg swelling admitted for PE found on CT with noted RHS. PERT team activated, with decision for medical management only. Notable otherwise for LE duplex with acute DVT of R peroneal vein. Orignally started on Lovenox and planned for transition to Eliquis in the afternoon.    OVERNIGHT EVENTS:   11P , 5A bs 300, straight cath x1    SUBJECTIVE:  The patient was seen and examined at the bedside this AM. No acute complaints, was feeling well without any chest pain or shortness of breath. Otherwise denies fevers, chills, chest pain, palpitations, abdominal pain, nausea, vomiting. Continues to have some diarrhea, 2-3x episodes per day for the last 10 days.    VITAL SIGNS:  Vital Signs Last 12 Hrs  T(F): 98.1 (10-20-24 @ 06:18), Max: 98.1 (10-19-24 @ 19:25)  HR: 74 (10-20-24 @ 06:09) (71 - 75)  BP: 136/70 (10-20-24 @ 06:09) (125/64 - 136/70)  BP(mean): 97 (10-20-24 @ 06:09) (91 - 97)  RR: 19 (10-20-24 @ 06:09) (19 - 20)  SpO2: 99% (10-20-24 @ 06:09) (96% - 99%)    I&O's Summary    19 Oct 2024 07:01  -  20 Oct 2024 06:41  --------------------------------------------------------  IN: 0 mL / OUT: 400 mL / NET: -400 mL      PHYSICAL EXAM:  GEN: Resting comfortably in NAD  ENMT: Moist oral mucosa; no conjunctival injection  RESP: No respiratory distress, no use of accessory muscles; CTA b/l, no WRR  CV: RRR, +S1S2, no MRG; no JVD; no peripheral edema  2+ radial, DP, PT   GI: Soft, NT, ND, no rebound, no guarding  MSK: No digital clubbing or cyanosis  SKIN: No rashes or ulcers noted  NEURO: Answers questions appropriately, moving all extremities spontaneously    MEDICATIONS:  MEDICATIONS  (STANDING):  enoxaparin Injectable 100 milliGRAM(s) SubCutaneous every 12 hours  finasteride 5 milliGRAM(s) Oral daily  tamsulosin 0.4 milliGRAM(s) Oral at bedtime    MEDICATIONS  (PRN):  albuterol    90 MICROgram(s) HFA Inhaler 2 Puff(s) Inhalation every 6 hours PRN for shortness of breath and/or wheezing      ALLERGIES:  Allergies    No Known Allergies    Intolerances        LABS:                        12.2   6.24  )-----------( 207      ( 19 Oct 2024 01:23 )             38.0     10-19    136  |  101  |  7   ----------------------------<  116[H]  3.1[L]   |  26  |  0.73    Ca    8.6      19 Oct 2024 01:23  Mg     1.6     10-19      PT/INR - ( 19 Oct 2024 01:23 )   PT: 15.0 sec;   INR: 1.29          PTT - ( 19 Oct 2024 01:23 )  PTT:26.7 sec    RADIOLOGY & ADDITIONAL TESTS: Reviewed. HOSPITAL COURSE:  78yo M w/ PMHx HTn, CAD, DM2, BPH, & peripheral neuropathy, lumbar spinal stenosis originally presented from [facility] for bilateral leg swelling. Patient was recently discharged from hospital 3 days prior to admission, previously admitted for lower extremity weakness and noted to have spinal stenosis without cord compression. On discharge, patient was sent to the rehabilitation with supplemental O2 (new on discharge). After admission, was noted to have LE duplex with acute DVT of R peroneal vein, found subsequently on CTPE to have large R main pulmonary artery embolism with R heart strain. PERT team activated, decision for medical management only. Originally started on Lovenox and planned to transition to Eliquis on 10/20 PM. Course otherwise notable for continued loose bowel movements for which he was worked up at the previous admission with negative GI PCR and C. diff. Medically optimized for stepdown to F.    OVERNIGHT EVENTS:   11P , 5A bs 300, straight cath x1    SUBJECTIVE:  The patient was seen and examined at the bedside this AM. No acute complaints, was feeling well without any chest pain or shortness of breath. Otherwise denies fevers, chills, chest pain, palpitations, abdominal pain, nausea, vomiting. Continues to have some diarrhea, 2-3x episodes per day for the last 10 days.    VITAL SIGNS:  Vital Signs Last 12 Hrs  T(F): 98.1 (10-20-24 @ 06:18), Max: 98.1 (10-19-24 @ 19:25)  HR: 74 (10-20-24 @ 06:09) (71 - 75)  BP: 136/70 (10-20-24 @ 06:09) (125/64 - 136/70)  BP(mean): 97 (10-20-24 @ 06:09) (91 - 97)  RR: 19 (10-20-24 @ 06:09) (19 - 20)  SpO2: 99% (10-20-24 @ 06:09) (96% - 99%)    I&O's Summary    19 Oct 2024 07:01  -  20 Oct 2024 06:41  --------------------------------------------------------  IN: 0 mL / OUT: 400 mL / NET: -400 mL      PHYSICAL EXAM:  GEN: Resting comfortably in NAD  ENMT: Moist oral mucosa; no conjunctival injection  RESP: No respiratory distress, no use of accessory muscles; CTA b/l, no WRR  CV: RRR, +S1S2, no MRG; no JVD; no peripheral edema  2+ radial, DP, PT   GI: Soft, NT, ND, no rebound, no guarding  MSK: No digital clubbing or cyanosis  SKIN: No rashes or ulcers noted  NEURO: Answers questions appropriately, moving all extremities spontaneously    MEDICATIONS:  MEDICATIONS  (STANDING):  enoxaparin Injectable 100 milliGRAM(s) SubCutaneous every 12 hours  finasteride 5 milliGRAM(s) Oral daily  tamsulosin 0.4 milliGRAM(s) Oral at bedtime    MEDICATIONS  (PRN):  albuterol    90 MICROgram(s) HFA Inhaler 2 Puff(s) Inhalation every 6 hours PRN for shortness of breath and/or wheezing      ALLERGIES:  Allergies    No Known Allergies    Intolerances        LABS:                        12.2   6.24  )-----------( 207      ( 19 Oct 2024 01:23 )             38.0     10-19    136  |  101  |  7   ----------------------------<  116[H]  3.1[L]   |  26  |  0.73    Ca    8.6      19 Oct 2024 01:23  Mg     1.6     10-19      PT/INR - ( 19 Oct 2024 01:23 )   PT: 15.0 sec;   INR: 1.29          PTT - ( 19 Oct 2024 01:23 )  PTT:26.7 sec    RADIOLOGY & ADDITIONAL TESTS: Reviewed.

## 2024-10-20 NOTE — PROGRESS NOTE ADULT - PROBLEM SELECTOR PLAN 8
Fluids: none  Electrolytes: Mg >2, K >4  Nutrition: consistent carb   Prophylaxis: lovenox 100mg BID > Eliquis 10mg BID  Activity: as tolerated  GI: none  C: FC  Dispo: [facility] Chronic, seen on prior admission in Sept '24. Likely partially 2/2 spinal nerve compressions and spinal canal stenosis. Was discharged in Sept '24 w/ plan for vestibular rehab - was not able to go because of insurance issues.  On last admission MR Lumbar spine w/ L4-L5 spinal canal stenosis, L5-S1 nerve root compression, NO cord compression   - has outpatient ortho follow up w/ Dr Arthur  - PT/OT while inpatient

## 2024-10-20 NOTE — PROGRESS NOTE ADULT - SUBJECTIVE AND OBJECTIVE BOX
PULMONARY CONSULT SERVICE FOLLOW-UP NOTE    INTERVAL HPI:  Reviewed chart and overnight events- patient now on RA.  Patient seen and examined. He continues to endorse feeling well. Denies any shortness of breath, chest pain, palpitations, cough and pleurisy.    MEDICATIONS:  Pulmonary:  albuterol    90 MICROgram(s) HFA Inhaler 2 Puff(s) Inhalation every 6 hours PRN    Antimicrobials:    Anticoagulants:  apixaban 10 milliGRAM(s) Oral every 12 hours    Cardiac:      Allergies    No Known Allergies    Intolerances        Vital Signs Last 24 Hrs  T(C): 36.8 (20 Oct 2024 10:11), Max: 36.9 (19 Oct 2024 14:10)  T(F): 98.2 (20 Oct 2024 10:11), Max: 98.4 (19 Oct 2024 14:10)  HR: 73 (20 Oct 2024 08:56) (71 - 80)  BP: 118/81 (20 Oct 2024 08:56) (118/81 - 136/70)  BP(mean): 96 (20 Oct 2024 08:56) (86 - 97)  RR: 16 (20 Oct 2024 08:56) (16 - 22)  SpO2: 98% (20 Oct 2024 08:56) (95% - 99%)    Parameters below as of 20 Oct 2024 08:56  Patient On (Oxygen Delivery Method): nasal cannula  O2 Flow (L/min): 2      10-19 @ 07:01  -  10-20 @ 07:00  --------------------------------------------------------  IN: 0 mL / OUT: 400 mL / NET: -400 mL    10-20 @ 07:01  -  10-20 @ 11:36  --------------------------------------------------------  IN: 240 mL / OUT: 0 mL / NET: 240 mL          PHYSICAL EXAM:  Constitutional: well-appearing, in no apparent respiratory distress  HEENT: NC/AT; anicteric sclera; moist mucous membranes  Neck: supple, no JVD appreciated  Cardiovascular: +S1/S2, regular rate and rhythm, no murmurs appreciated   Respiratory: on RA saturating 94-95%. Clear breath sounds bilaterally. No wheezes, rhonchi or rales   Gastrointestinal: distended abdomen, but soft, non-tender.    Extremities: no edema  Neurological: awake and alert; oriented x3    LABS:  CBC Full  -  ( 20 Oct 2024 08:30 )  WBC Count : 4.88 K/uL  RBC Count : 4.34 M/uL  Hemoglobin : 12.5 g/dL  Hematocrit : 39.9 %  Platelet Count - Automated : 206 K/uL  Mean Cell Volume : 91.9 fl  Mean Cell Hemoglobin : 28.8 pg  Mean Cell Hemoglobin Concentration : 31.3 gm/dL  Auto Neutrophil # : 3.47 K/uL  Auto Lymphocyte # : 0.68 K/uL  Auto Monocyte # : 0.49 K/uL  Auto Eosinophil # : 0.16 K/uL  Auto Basophil # : 0.06 K/uL  Auto Neutrophil % : 71.2 %  Auto Lymphocyte % : 13.9 %  Auto Monocyte % : 10.0 %  Auto Eosinophil % : 3.3 %  Auto Basophil % : 1.2 %    10-20    141  |  105  |  7   ----------------------------<  75  3.4[L]   |  27  |  0.68    Ca    8.9      20 Oct 2024 08:30  Phos  3.2     10-20  Mg     1.7     10-20      PT/INR - ( 20 Oct 2024 08:30 )   PT: 15.4 sec;   INR: 1.32          PTT - ( 20 Oct 2024 08:30 )  PTT:35.6 sec          RADIOLOGY & ADDITIONAL STUDIES:  < from: CT Angio Chest PE Protocol w/ IV Cont (10.19.24 @ 06:13) >    ACC: [id number] EXAM:  CT ANGIO CHEST PULM [facility]   ORDERED BY: SHI BURDICK     PROCEDURE DATE:  10/19/2024          INTERPRETATION:  CLINICAL INFORMATION: Positive DVT, rule out PE.    COMPARISON: None.    CONTRAST/COMPLICATIONS:  IV Contrast: Isovue 370  97 cc administered   0 cc discarded  Oral Contrast: NONE  Complications: None reported at time of study completion    PROCEDURE:  CT Angiography of the Chest.  Sagittal and coronal reformats were performed as well as 3D (MIP)   reconstructions.    FINDINGS:    LUNGS AND LARGE AIRWAYS: Left basilar atelectasis.  Left subsegmental   bronchial impaction and associated volume loss. Basilar left lower lobe   atelectasis.  PLEURA: No pleural effusion.  VESSELS: Large right main pulmonary artery embolism extending into the   lobar/segmental branches. There is associated right heart strain.  HEART: Heart size is normal. No pericardial effusion.  MEDIASTINUM AND ROCIO: No lymphadenopathy.  CHEST WALL AND LOWER NECK: Posterior chest wallintramuscular lipomas.  VISUALIZED UPPER ABDOMEN: 4.7 x 3.6 cm right renal exophytic cyst.   Transverse colon dilation, measuring up to 6.3 cm.  BONES: Degenerative changes. Flowing ossification along the more than 3   anterior contiguous vertebrae and calcification of anterior longitudinal   ligament suggestive of diffuse idiopathic skeletal hyperostosis.    IMPRESSION:   Large right main pulmonary artery embolism with associated right heart   strain.    Findings were discussed with  by Dr. Sanford on 10/19/2024   6:37 AM    --- End of Report ---    < end of copied text >

## 2024-10-20 NOTE — CHART NOTE - NSCHARTNOTEFT_GEN_A_CORE
*****Chief Surgery Resident Note*****    78yo M, HTN, CAD, DM2, BPH, peripheral neuropathy. Multiple admissions to medical service over past month 2/2 worsening LE weakness. Extensive w/up revealed Lumbar spinal stenosis. Also had UTI + non-infectious diarrhea, GI PCR/C.Diff negative (10/15). KUB notable for colonic distention (6.5cm). Discharged to [facility] (10/16) Represented to ER 10/19 w b/l LE swelling, LE duplex + DVT, found to have massive R main PE w R heart strain, admitted to medical service for mgmt. KUB today w progressive pancolonic distention, cecum ~13cm, for which general surgery was consulted. At time of assessment, actively having BM, +flatus, denies N/V/CP/SOB. HD w/nl. Abd soft, ND, NT, tympanic. ITZEL w brown liquid stool in rectal vault. Labs unremarkable. Of note, has never had cscope. DDx at this time broad but favors colonic ileus vs pseudo obstruction, must r/out mechanical LBO ie malignancy. Small bowel dilation appreciated on CTA PE protocol suggest incompetent ICV. Less likely C. Diff colitis, IBD. No acute surgical intervention warranted at this time, CT A/P w PO/IV, can defer NGT at this time as not N/V, GI c/s as may require endoscopic eval vs decompression. No role for neostigmine at this time until etiology can further be clarified. 4C to continue following Discussed w [facility] attending on call.

## 2024-10-20 NOTE — PROGRESS NOTE ADULT - ASSESSMENT
79 year old male with PMHx of HTN, CAD, DM2, BPH, peripheral neuropathy and lumbar spinal stenosis who was sent in from [facility] for bilateral leg swelling admitted for PE, started on lovenox.    NEURO  MAGALYS    CARDS  # HTN (hypertension)   Home meds: Spironolactone 50qd, Losartan 25qd  - resume as appropriate    #CAD (coronary artery disease). Home med ASA 81mg qd  - hold iso potentially going for intervention, re-start after    PULM  #Pulmonary Embolism  - Patient presenting w calf swelling, and imaged found to have a R peroneal DVT, subsequently with CTPE study with large right main pulmonary artery embolism with associated right heart   strain. Left basilar consolidation versus pulmonary infarct. Transverse colon dilation, measuring up to 6.3 cm. PESI III (Class III risk) , SPESI 1 point due to CAD. Troponin and BNP biomarkers re-assuringly within normal limits, CPES score 2 currently, pending formal TTE. S/p eliquis 10mg x 1 in ED  - PERT consulted, can be potential candidate for intervention as per PERT  - started on lovenox 100mg BID  - F/u PERT recs  - f/u Formal TTE    GI  #Diarrhea  Had loose stools in the ED, known history of fecal incontinence; on last admission ruled out infectious causesl negative GIPCR and negative c diff. + abdominal distension. last admission KUB suggestive of possible colonic ileus but no stool burden and patient is moving bowels. likely due to fecal incontinence and non infectious.  -continue to monitor electrolytes and replete as needed   -CTM diarrheal output with hydration     RENAL  MAGALYS      # BPH (benign prostatic hyperplasia).   Home meds: Tamsulosin 0.4mg qd, Finasteride 5mg qd  - c/w home meds  - q6h bladder scan    ENDO  #DM2 (diabetes mellitus, type 2).    Home meds: Weekly Ozempic injections, Metformin 500mg BID. PT states he has never required insulin. Last A1c wnl   - DASH CC diet, mISS  - hold metformin while inpatient    MSK  #Bilateral leg weakness  Chronic, seen on prior admission in Sept '24. Likely partially 2/2 spinal nerve compressions and spinal canal stenosis. Was discharged in Sept '24 w/ plan for vestibular rehab - was not able to go because of insurance issues.  On last admission MR Lumbar spine w/ L4-L5 spinal canal stenosis, L5-S1 nerve root compression, NO cord compression   - has outpatient ortho follow up w/ Dr Arthur  - PT/OT while inpatient    # History of peripheral neuropathy.   Iso long-standing DM. Home med: Gabapentin 300mg TID   - c/w home med    Fluids: none  Electrolytes: Mg >2, K >4  Nutrition: NPO except meds  Prophylaxis: lovenox 100mg BID  Activity: as tolerated  GI: none  C: FC  Dispo: 7lach   79 year old male with PMHx of HTN, CAD, DM2, BPH, peripheral neuropathy and lumbar spinal stenosis who was sent in from [facility] for bilateral leg swelling admitted for PE, started on lovenox, plan for transitioning to Eliquis in the PM.     NEURO  MAGALYS    CARDS  # HTN (hypertension)   Home meds: Spironolactone 50qd, Losartan 25qd  - resume as appropriate    #CAD (coronary artery disease). Home med ASA 81mg qd  - restart ASA    PULM  #Pulmonary Embolism  - Patient presenting w calf swelling, and imaged found to have a R peroneal DVT, subsequently with CTPE study with large right main pulmonary artery embolism with associated right heart   strain. Left basilar consolidation versus pulmonary infarct. Transverse colon dilation, measuring up to 6.3 cm. PESI III (Class III risk) , SPESI 1 point due to CAD. Troponin and BNP biomarkers re-assuringly within normal limits, CPES score 2 currently, pending formal TTE. S/p eliquis 10mg x 1 in ED  - PERT consulted, no plan for thrombectomy  - started on lovenox 100mg BID; transition to Eliquis in PM  - f/u Formal TTE    GI  #Diarrhea  Had loose stools in the ED, known history of fecal incontinence; on last admission ruled out infectious causes negative GIPCR and negative c diff. + abdominal distension. last admission KUB suggestive of possible colonic ileus but no stool burden and patient is moving bowels. likely due to fecal incontinence and non infectious.  - continue to monitor electrolytes and replete as needed   - CTM diarrheal output with hydration     RENAL  MAGALYS      # BPH (benign prostatic hyperplasia).   Home meds: Tamsulosin 0.4mg qd, Finasteride 5mg qd  - c/w home meds  - q6h bladder scan    ENDO  #DM2 (diabetes mellitus, type 2).    Home meds: Weekly Ozempic injections, Metformin 500mg BID. PT states he has never required insulin. Last A1c wnl   - DASH CC diet, mISS  - hold metformin while inpatient    MSK  #Bilateral leg weakness  Chronic, seen on prior admission in Sept '24. Likely partially 2/2 spinal nerve compressions and spinal canal stenosis. Was discharged in Sept '24 w/ plan for vestibular rehab - was not able to go because of insurance issues.  On last admission MR Lumbar spine w/ L4-L5 spinal canal stenosis, L5-S1 nerve root compression, NO cord compression   - has outpatient ortho follow up w/ Dr Arthur  - PT/OT while inpatient    # History of peripheral neuropathy.   Iso long-standing DM. Home med: Gabapentin 300mg TID   - c/w home med    Fluids: none  Electrolytes: Mg >2, K >4  Nutrition: consistent carb   Prophylaxis: lovenox 100mg BID  Activity: as tolerated  GI: none  C: FC  Dispo: 7lach   79 year old male with PMHx of HTN, CAD, DM2, BPH, peripheral neuropathy and lumbar spinal stenosis who was sent in from [facility] for bilateral leg swelling admitted for PE, started on lovenox, plan for transitioning to Eliquis in the PM.     NEURO  MAGALYS    CARDS  # HTN (hypertension)   Home meds: Spironolactone 50qd, Losartan 25qd  - resume as appropriate    #CAD (coronary artery disease). Home med ASA 81mg qd  - restart ASA    PULM  #Pulmonary Embolism  - Patient presenting w calf swelling, and imaged found to have a R peroneal DVT, subsequently with CTPE study with large right main pulmonary artery embolism with associated right heart   strain. Left basilar consolidation versus pulmonary infarct. Transverse colon dilation, measuring up to 6.3 cm. PESI III (Class III risk) , SPESI 1 point due to CAD. Troponin and BNP biomarkers re-assuringly within normal limits, CPES score 2 currently, pending formal TTE. S/p eliquis 10mg x 1 in ED  - PERT consulted, no plan for thrombectomy  - started on lovenox 100mg BID; transition to Eliquis in PM  - f/u Formal TTE    GI  #Diarrhea  Had loose stools in the ED, known history of fecal incontinence; on last admission ruled out infectious causes negative GIPCR and negative c diff. + abdominal distension. last admission KUB suggestive of possible colonic ileus but no stool burden and patient is moving bowels. likely due to fecal incontinence and non infectious.  - continue to monitor electrolytes and replete as needed   - CTM diarrheal output with hydration     RENAL  MAGALYS      # BPH (benign prostatic hyperplasia).   Home meds: Tamsulosin 0.4mg qd, Finasteride 5mg qd  - c/w home meds  - q6h bladder scan    ENDO  #DM2 (diabetes mellitus, type 2).    Home meds: Weekly Ozempic injections, Metformin 500mg BID. PT states he has never required insulin. Last A1c wnl   - DASH CC diet, mISS  - hold metformin while inpatient    MSK  #Bilateral leg weakness  Chronic, seen on prior admission in Sept '24. Likely partially 2/2 spinal nerve compressions and spinal canal stenosis. Was discharged in Sept '24 w/ plan for vestibular rehab - was not able to go because of insurance issues.  On last admission MR Lumbar spine w/ L4-L5 spinal canal stenosis, L5-S1 nerve root compression, NO cord compression   - has outpatient ortho follow up w/ Dr Arthur  - PT/OT while inpatient    # History of peripheral neuropathy.   Iso long-standing DM. Home med: Gabapentin 300mg TID   - c/w home med    Fluids: none  Electrolytes: Mg >2, K >4  Nutrition: consistent carb   Prophylaxis: lovenox 100mg BID > Eliquis 10mg  Activity: as tolerated  GI: none  C: FC  Dispo: 7lach 79 year old male with PMHx of HTN, CAD, DM2, BPH, peripheral neuropathy and lumbar spinal stenosis who was sent in from [facility] for bilateral leg swelling admitted for PE, started on lovenox, plan for transitioning to Eliquis in the PM.     NEURO  MAGALYS    CARDS  # HTN (hypertension)   Home meds: Spironolactone 50qd, Losartan 25qd  - resume as appropriate    #CAD (coronary artery disease). Home med ASA 81mg qd  - restart ASA    PULM  #Pulmonary Embolism  - Patient presenting w calf swelling, and imaged found to have a R peroneal DVT, subsequently with CTPE study with large right main pulmonary artery embolism with associated right heart   strain. Left basilar consolidation versus pulmonary infarct. Transverse colon dilation, measuring up to 6.3 cm. PESI III (Class III risk) , SPESI 1 point due to CAD. Troponin and BNP biomarkers re-assuringly within normal limits, CPES score 2 currently, pending formal TTE. S/p eliquis 10mg x 1 in ED  - PERT consulted, no plan for thrombectomy  - started on lovenox 100mg BID; transition to Eliquis in PM    GI  #Diarrhea  Had loose stools in the ED, known history of fecal incontinence; on last admission ruled out infectious causes negative GIPCR and negative c diff. + abdominal distension. last admission KUB suggestive of possible colonic ileus but no stool burden and patient is moving bowels. likely due to fecal incontinence and non infectious.  - continue to monitor electrolytes and replete as needed   - CTM diarrheal output with hydration     RENAL  MAGALYS      # BPH (benign prostatic hyperplasia).   Home meds: Tamsulosin 0.4mg qd, Finasteride 5mg qd  - c/w home meds  - q6h bladder scan    ENDO  #DM2 (diabetes mellitus, type 2).    Home meds: Weekly Ozempic injections, Metformin 500mg BID. PT states he has never required insulin. Last A1c wnl   - DASH CC diet, mISS  - hold metformin while inpatient    MSK  #Bilateral leg weakness  Chronic, seen on prior admission in Sept '24. Likely partially 2/2 spinal nerve compressions and spinal canal stenosis. Was discharged in Sept '24 w/ plan for vestibular rehab - was not able to go because of insurance issues.  On last admission MR Lumbar spine w/ L4-L5 spinal canal stenosis, L5-S1 nerve root compression, NO cord compression   - has outpatient ortho follow up w/ Dr Arthur  - PT/OT while inpatient    # History of peripheral neuropathy.   Iso long-standing DM. Home med: Gabapentin 300mg TID   - c/w home med    Fluids: none  Electrolytes: Mg >2, K >4  Nutrition: consistent carb   Prophylaxis: lovenox 100mg BID > Eliquis 10mg BID  Activity: as tolerated  GI: none  C: FC  Dispo: 7lach

## 2024-10-20 NOTE — CONSULT NOTE ADULT - ASSESSMENT
80yo Male pt with PMH of CAD, HTN, T2DM, BPH, and peripheral neuropathy and no PSHx who initially presented to [facility] ED on 10/10 with bilateral lower extremity weakness and was ultimately admitted, diagnosed, and discharged to [facility] with lumbar spinal stenosis with new supplemental O2. During that admission, patient had abdominal distension and diarrhea however GI PCR and CDiff were negative at that time with KUB findings of Mild gaseous distention of colonic loops measuring up to 6.7 cm. Patient represented to [facility] ED 3d after discharge from [facility] with new bilateral lower extremity swelling. ED workup included Bilateral Lower Extremity Venous duplex with findings of acute DVT of right peroneal vein and CTA PE with findings of Large right main pulmonary artery embolism with associated right heart   strain. Patient was started on eliquis, admitted to telemetry, and stepped down to regional today. Patient continues to endorse nonbloody diarrhea x3 daily with bloating and mild diffuse abdominal pain. AXR was obtained with findings of   Diffuse colonic distention up to 13cm in right abdomen, increased since October 14. General surgery was consulted for colonic distension in the setting of persistent diarrhea. Low suspicion for toxic megacolon as patient is well appearing with normal vitals, laboratory findings, and negative infectious workup. Cannot rule out obstruction or Lead's at this time.    Recommendations:  No acute surgical intervention at this time  Recommend CT A/P with PO and IV contrast to rule out obstruction  GI consult for possible Cscope  Avoid narcotics  Team 4C will continue to follow    Plan discussed with chief resident and attending

## 2024-10-20 NOTE — PROGRESS NOTE ADULT - PROBLEM SELECTOR PLAN 2
Home meds: Spironolactone 50qd, Losartan 25qd  - resume as appropriate extensive adbominal distention + hypertynamic all over; diminished BS  XRay abdomen shows increasing colonic distention 13 cm in diameter  -serial abdominal xrays  -consult gen surg r/o Ogilvies syndrome extensive adbominal distention + hypertynamic all over; diminished BS  XRay abdomen shows increasing colonic distention 13 cm in diameter  -serial abdominal xrays  -consult gen surg r/o Ogilvies syndrome  -repelte lytes Mag>2, K>4 extensive adbominal distention + hypertynamic all over; diminished BS  XRay abdomen shows increasing colonic distention 13 cm in diameter  -serial abdominal xrays  -repelte lytes Mag>2, K>4  -CT abdomen/pelvis w/ PO and IV contrast  -cdiff and gi pcr , r/o toxic megacolon   - f/u gen surg recs  -NPO   -to place NGT if worsening for decompression

## 2024-10-20 NOTE — PROGRESS NOTE ADULT - PROBLEM SELECTOR PLAN 7
Chronic, seen on prior admission in Sept '24. Likely partially 2/2 spinal nerve compressions and spinal canal stenosis. Was discharged in Sept '24 w/ plan for vestibular rehab - was not able to go because of insurance issues.  On last admission MR Lumbar spine w/ L4-L5 spinal canal stenosis, L5-S1 nerve root compression, NO cord compression   - has outpatient ortho follow up w/ Dr Arthur  - PT/OT while inpatient Home meds: Tamsulosin 0.4mg qd, Finasteride 5mg qd  - c/w home meds  - q6h bladder scan

## 2024-10-20 NOTE — PROGRESS NOTE ADULT - PROBLEM SELECTOR PLAN 4
Had loose stools in the ED, known history of fecal incontinence; on last admission ruled out infectious causes negative GIPCR and negative c diff. + abdominal distension. last admission KUB suggestive of possible colonic ileus but no stool burden and patient is moving bowels. likely due to fecal incontinence and non infectious.  - continue to monitor electrolytes and replete as needed   - CTM diarrheal output with hydration c/w home asa 81 mg qd

## 2024-10-20 NOTE — PROGRESS NOTE ADULT - PROBLEM SELECTOR PLAN 6
Home meds: Tamsulosin 0.4mg qd, Finasteride 5mg qd  - c/w home meds  - q6h bladder scan Home meds: Weekly Ozempic injections, Metformin 500mg BID. PT states he has never required insulin. Last A1c wnl   - DASH CC diet, mISS  - hold metformin while inpatient  -peripheral neuropathy Iso long-standing DM. Home med: Gabapentin 300mg TID   - c/w gabapentin 300mg TID

## 2024-10-20 NOTE — PROGRESS NOTE ADULT - SUBJECTIVE AND OBJECTIVE BOX
HOSPITAL COURSE:  78yo M w/ PMHx HTn, CAD, DM2, BPH, & peripheral neuropathy, lumbar spinal stenosis originally presented from [facility] for bilateral leg swelling. Patient was recently discharged from hospital 3 days prior to admission, previously admitted for lower extremity weakness and noted to have spinal stenosis without cord compression. On discharge, patient was sent to the rehabilitation with supplemental O2 (new on discharge). After admission, was noted to have LE duplex with acute DVT of R peroneal vein, found subsequently on CTPE to have large R main pulmonary artery embolism with R heart strain. PERT team activated, decision for medical management only. Originally started on Lovenox and planned to transition to Eliquis on 10/20 PM. Course otherwise notable for continued loose bowel movements for which he was worked up at the previous admission with negative GI PCR and C. diff. Medically optimized for stepdown to [facility].      SUBJECTIVE:  The patient was seen and examined at the bedside this AM. No acute complaints, was feeling well without any chest pain or shortness of breath. Otherwise denies fevers, chills, chest pain, palpitations, abdominal pain, nausea, vomiting. Continues to have some diarrhea, 2-3x episodes per day for the last 10 days.    VITAL SIGNS:  T(F): 98.6 (10-20-24 @ 13:20)  HR: 76 (10-20-24 @ 16:35)  BP: 115/59 (10-20-24 @ 16:35)  RR: 19 (10-20-24 @ 16:35)  SpO2: 93% (10-20-24 @ 16:35)  Wt(kg): --      PHYSICAL EXAM:  GEN: Resting comfortably in NAD  ENMT: Moist oral mucosa; no conjunctival injection  RESP: No respiratory distress, no use of accessory muscles; CTA b/l, no WRR  CV: RRR, +S1S2, no MRG; no JVD; no peripheral edema  2+ radial, DP, PT   GI: Soft, NT, ND, no rebound, no guarding  MSK: No digital clubbing or cyanosis  SKIN: No rashes or ulcers noted  NEURO: Answers questions appropriately, moving all extremities spontaneously        10-19-24 @ 07:01  -  10-20-24 @ 07:00  --------------------------------------------------------  IN: 0 mL / OUT: 400 mL / NET: -400 mL    10-20-24 @ 07:01  -  10-20-24 @ 17:49  --------------------------------------------------------  IN: 526 mL / OUT: 0 mL / NET: 526 mL    MEDICATIONS  (STANDING):  apixaban 10 milliGRAM(s) Oral every 12 hours  aspirin enteric coated 81 milliGRAM(s) Oral daily  finasteride 5 milliGRAM(s) Oral daily  tamsulosin 0.4 milliGRAM(s) Oral at bedtime    MEDICATIONS  (PRN):  albuterol    90 MICROgram(s) HFA Inhaler 2 Puff(s) Inhalation every 6 hours PRN for shortness of breath and/or wheezing      Allergies    No Known Allergies    Intolerances        LABS:                        12.5   4.88  )-----------( 206      ( 20 Oct 2024 08:30 )             39.9     10-20    141  |  105  |  7   ----------------------------<  75  3.4[L]   |  27  |  0.68    Ca    8.9      20 Oct 2024 08:30  Phos  3.2     10-20  Mg     1.7     10-20      PT/INR - ( 20 Oct 2024 08:30 )   PT: 15.4 sec;   INR: 1.32          PTT - ( 20 Oct 2024 08:30 )  PTT:35.6 sec  Urinalysis Basic - ( 20 Oct 2024 08:30 )    Color: x / Appearance: x / SG: x / pH: x  Gluc: 75 mg/dL / Ketone: x  / Bili: x / Urobili: x   Blood: x / Protein: x / Nitrite: x   Leuk Esterase: x / RBC: x / WBC x   Sq Epi: x / Non Sq Epi: x / Bacteria: x        RADIOLOGY & ADDITIONAL TESTS:  Reviewed     *** TRANSFER ACCEPTANCE FROM [facility] TO [facility] ***    HOSPITAL COURSE:  78yo M w/ PMHx HTn, CAD, DM2, BPH, & peripheral neuropathy, lumbar spinal stenosis originally presented from [facility] for bilateral leg swelling. Patient was recently discharged from hospital 3 days prior to admission, previously admitted for lower extremity weakness and noted to have spinal stenosis without cord compression. On discharge, patient was sent to the rehabilitation with supplemental O2 (new on discharge). After admission, was noted to have LE duplex with acute DVT of R peroneal vein, found subsequently on CTPE to have large R main pulmonary artery embolism with R heart strain. PERT team activated, decision for medical management only. Originally started on Lovenox and planned to transition to Eliquis on 10/20 PM. Course otherwise notable for continued loose bowel movements for which he was worked up at the previous admission with negative GI PCR and C. diff. Medically optimized for stepdown to [facility].      SUBJECTIVE:  The patient was seen and examined at the bedside this AM. No acute complaints, was feeling well without any chest pain or shortness of breath. Otherwise denies fevers, chills, chest pain, palpitations, abdominal pain, nausea, vomiting. Continues to have some diarrhea, 2-3x episodes per day for the last 10 days.    VITAL SIGNS:  T(F): 98.6 (10-20-24 @ 13:20)  HR: 76 (10-20-24 @ 16:35)  BP: 115/59 (10-20-24 @ 16:35)  RR: 19 (10-20-24 @ 16:35)  SpO2: 93% (10-20-24 @ 16:35)  Wt(kg): --      PHYSICAL EXAM:  GEN: Resting comfortably in NAD  ENMT: Moist oral mucosa; no conjunctival injection  RESP: No respiratory distress, no use of accessory muscles; CTA b/l, no WRR  CV: RRR, +S1S2, no MRG; no JVD; no peripheral edema  2+ radial, DP, PT   GI: Soft, NT, ND, no rebound, no guarding  MSK: No digital clubbing or cyanosis  SKIN: No rashes or ulcers noted  NEURO: Answers questions appropriately, moving all extremities spontaneously        10-19-24 @ 07:01  -  10-20-24 @ 07:00  --------------------------------------------------------  IN: 0 mL / OUT: 400 mL / NET: -400 mL    10-20-24 @ 07:01  -  10-20-24 @ 17:49  --------------------------------------------------------  IN: 526 mL / OUT: 0 mL / NET: 526 mL    MEDICATIONS  (STANDING):  apixaban 10 milliGRAM(s) Oral every 12 hours  aspirin enteric coated 81 milliGRAM(s) Oral daily  finasteride 5 milliGRAM(s) Oral daily  tamsulosin 0.4 milliGRAM(s) Oral at bedtime    MEDICATIONS  (PRN):  albuterol    90 MICROgram(s) HFA Inhaler 2 Puff(s) Inhalation every 6 hours PRN for shortness of breath and/or wheezing      Allergies    No Known Allergies    Intolerances        LABS:                        12.5   4.88  )-----------( 206      ( 20 Oct 2024 08:30 )             39.9     10-20    141  |  105  |  7   ----------------------------<  75  3.4[L]   |  27  |  0.68    Ca    8.9      20 Oct 2024 08:30  Phos  3.2     10-20  Mg     1.7     10-20      PT/INR - ( 20 Oct 2024 08:30 )   PT: 15.4 sec;   INR: 1.32          PTT - ( 20 Oct 2024 08:30 )  PTT:35.6 sec  Urinalysis Basic - ( 20 Oct 2024 08:30 )    Color: x / Appearance: x / SG: x / pH: x  Gluc: 75 mg/dL / Ketone: x  / Bili: x / Urobili: x   Blood: x / Protein: x / Nitrite: x   Leuk Esterase: x / RBC: x / WBC x   Sq Epi: x / Non Sq Epi: x / Bacteria: x        RADIOLOGY & ADDITIONAL TESTS:  Reviewed

## 2024-10-20 NOTE — CONSULT NOTE ADULT - SUBJECTIVE AND OBJECTIVE BOX
80yo Male pt with PMH of CAD, HTN, T2DM, BPH, and peripheral neuropathy and no PSHx who initially presented to [facility] ED on 10/10 with bilateral lower extremity weakness and was ultimately admitted, diagnosed, and discharged to [facility] with lumbar spinal stenosis with new supplemental O2. During that admission, patient had abdominal distension and diarrhea however GI PCR and CDiff were negative at that time with KUB findings of Mild gaseous distention of colonic loops measuring up to 6.7 cm. Patient represented to [facility] ED 3d after discharge from [facility] with new bilateral lower extremity swelling. ED workup included Bilateral Lower Extremity Venous duplex with findings of acute DVT of right peroneal vein and CTA PE with findings of Large right main pulmonary artery embolism with associated right heart   strain. Patient was started on eliquis, admitted to telemetry, and stepped down to regional today. Patient continues to endorse nonbloody diarrhea x3 daily with bloating and mild diffuse abdominal pain. AXR was obtained with findings of   Diffuse colonic distention up to 13cm in right abdomen, increased since October 14. General surgery was consulted for colonic distension in the setting of persistent diarrhea.    On evaluation by general surgery team, patient is sitting in bed comfortably with no complaints at this time. Patient states he has had consistent nonbloody diarrhea since initial ED presentation 10d ago with mild diffuse abd pain only associated with bowel movements. Patient denies symptoms like this before and currently denies bloating. Patient denies fever, chills, nausea, emesis, hiccups or frequent burping. Patient states he has never had a colonoscopy before, denies unexpected weight loss, blood per rectum, shortness of breath, or dizziness. On exam, abdomen is soft, NT, moderate distension without rebound or guarding, ITZEL - for blood with brown stool in vault.    PMH: CAD, HTN, T2DM, BPH, and peripheral neuropathy  PSHx: Denies  Medications: metformin 500mg BID, gabapentin 300mg TID, spirinolactone 50mg qD, losartan 25mg qD, ASA 81mg qD, tamsulosin 0.4, finasteride 5, and ozempic  Allergies: NKDA  Social Hx: Denies  Family Hx: Denies family hx of IBS, Crohn's, UC, or colon cancer.  Last colonoscopy: Denies  Last EGD: Denies    T(C): 36.6 (10-20-24 @ 18:20), Max: 37 (10-20-24 @ 13:20)  HR: 80 (10-20-24 @ 18:20) (73 - 80)  BP: 120/62 (10-20-24 @ 18:20) (115/59 - 136/70)  RR: 16 (10-20-24 @ 18:20) (16 - 20)  SpO2: 97% (10-20-24 @ 18:20) (93% - 100%)    Physical Exam  General: AAOx3, NAD, laying comfortably in bed  Cardio: S1,S2, No MRG  Pulm: Nonlabored breathing on RA  Abdomen: obese body habitus, soft, NT, moderate distension without rebound or guarding, ITZEL - for blood with brown stool in vault.  Extremities: WWP, peripheral pulses appreciated      LABS:                     12.5   4.88  )-----------( 206      ( 20 Oct 2024 08:30 )             39.9   10-20  141  |  105  |  7   ----------------------------<  75  3.4[L]   |  27  |  0.68  Ca    8.9      20 Oct 2024 08:30  Phos  3.2     10-20  Mg     1.7     10-20  PT/INR - ( 20 Oct 2024 08:30 )   PT: 15.4 sec;   INR: 1.32     PTT - ( 20 Oct 2024 08:30 )  PTT:35.6 sec    ACC: [id number] EXAM:  XR ABDOMEN PORTABLE URGENT 1V   ORDERED BY:   KAYLIE CHANDLER     PROCEDURE DATE:  10/20/2024          INTERPRETATION:  Clinical history: Abdominal distention    Abdomen single view    Diffuse colonic distention is seen increased since October 14. A loop of   colon in the right abdomen measures up to 13 cm in diameter.    IMPRESSION: Increasing colonic distention with measurements as above    --- End of Report ---            STEFFI SANTA MD; Attending Radiologist  This document has been electronically signed. Oct 20 2024  5:53PM

## 2024-10-20 NOTE — PROGRESS NOTE ADULT - PROBLEM SELECTOR PLAN 5
Home meds: Weekly Ozempic injections, Metformin 500mg BID. PT states he has never required insulin. Last A1c wnl   - DASH CC diet, mISS  - hold metformin while inpatient  -peripheral neuropathy Iso long-standing DM. Home med: Gabapentin 300mg TID   - c/w gabapentin 300mg TID Had loose stools in the ED, known history of fecal incontinence; on last admission ruled out infectious causes negative GIPCR and negative c diff. + abdominal distension. last admission KUB suggestive of possible colonic ileus but no stool burden and patient is moving bowels. likely due to fecal incontinence and non infectious.  - continue to monitor electrolytes and replete as needed   - CTM diarrheal output with hydration Had loose stools in the ED, known history of fecal incontinence; on last admission ruled out infectious causes negative GIPCR and negative c diff. + abdominal distension. last admission KUB suggestive of possible colonic ileus but no stool burden and patient is moving bowels. likely due to fecal incontinence and non infectious.  - continue to monitor electrolytes and replete as needed   - CTM diarrheal output with hydration  -f/u cdiff and gi pcr

## 2024-10-20 NOTE — PROGRESS NOTE ADULT - ASSESSMENT
79 year old male presented with LE swelling from NH after recent hospitalization. On presentation, patient was found to have positive DVT study, given recent addition of supplemental O2 patient was taken for CTPE and was found to have large right main pulmonary artery embolism with associated right heart strain. TTE with RV dilation and septal motion abnormality in systole and diastole. Biomarkers were negative.    SPESI score:   ESC risk: [  ] low  [ X ]int low  [  ] int high  [  ]high     Risk factors:  + recent hospitalization  + family member (sister) with blood clot- reports it was medication induced, unsure which medication    RECOMMENDATIONS:  - Continue with Lovenox for now  - Given hemodynamically stable with no significant clots in the main vessels and no respiratory symptoms, no intervention needed at this time but can be considered for EOKS if symptoms arise    Case discussed with Dr. Watts and PERT team  Incomplete note until co-signed by attending   79 year old male presented with LE swelling from NH after recent hospitalization. On presentation, patient was found to have positive DVT study, given recent addition of supplemental O2 patient was taken for CTPE and was found to have large right main pulmonary artery embolism with associated right heart strain. TTE with RV dilation and septal motion abnormality in systole and diastole. Biomarkers were negative.    SPESI score:   ESC risk: [  ] low  [ X ]int low  [  ] int high  [  ]high     Risk factors:  + recent hospitalization  + family member (sister) with blood clot- reports it was medication induced, unsure which medication    RECOMMENDATIONS:  - Can transition to Eliquis   - Now on RA, encourage ambulation, OOBC, incentive spirometer- may not need supplemental O2 on discharge. Can do 6MWT      Case discussed with Dr. Watts and PERT team  Incomplete note until co-signed by attending   79 year old male presented with LE swelling from NH after recent hospitalization. On presentation, patient was found to have positive DVT study, given recent addition of supplemental O2 patient was taken for CTPE and was found to have large right main pulmonary artery embolism with associated right heart strain. TTE with RV dilation and septal motion abnormality in systole and diastole. Biomarkers were negative.    SPESI score:   ESC risk: [  ] low  [ X ]int low  [  ] int high  [  ]high     Risk factors:  + recent hospitalization  + family member (sister) with blood clot- reports it was medication induced, unsure which medication    RECOMMENDATIONS:  - Can transition to Eliquis   - Now on RA, encourage ambulation, OOBC, incentive spirometer- may not need supplemental O2 on discharge. Can do 6MWT  - Follow up with Dr. Watts in 3 months- will need repeat TTE      Case discussed with Dr. Watts and PERT team  Incomplete note until co-signed by attending

## 2024-10-21 ENCOUNTER — TRANSCRIPTION ENCOUNTER (OUTPATIENT)
Age: 79
End: 2024-10-21

## 2024-10-21 LAB
A1C WITH ESTIMATED AVERAGE GLUCOSE RESULT: 6.4 % — HIGH (ref 4–5.6)
ALBUMIN SERPL ELPH-MCNC: 3.3 G/DL — SIGNIFICANT CHANGE UP (ref 3.3–5)
ALP SERPL-CCNC: 51 U/L — SIGNIFICANT CHANGE UP (ref 40–120)
ALT FLD-CCNC: 21 U/L — SIGNIFICANT CHANGE UP (ref 10–45)
AMORPH PHOS CRY # URNS: PRESENT
ANION GAP SERPL CALC-SCNC: 8 MMOL/L — SIGNIFICANT CHANGE UP (ref 5–17)
ANION GAP SERPL CALC-SCNC: 9 MMOL/L — SIGNIFICANT CHANGE UP (ref 5–17)
APPEARANCE UR: ABNORMAL
AST SERPL-CCNC: 16 U/L — SIGNIFICANT CHANGE UP (ref 10–40)
BACTERIA # UR AUTO: ABNORMAL /HPF
BASOPHILS # BLD AUTO: 0.05 K/UL — SIGNIFICANT CHANGE UP (ref 0–0.2)
BASOPHILS NFR BLD AUTO: 0.9 % — SIGNIFICANT CHANGE UP (ref 0–2)
BILIRUB SERPL-MCNC: 0.4 MG/DL — SIGNIFICANT CHANGE UP (ref 0.2–1.2)
BILIRUB UR-MCNC: NEGATIVE — SIGNIFICANT CHANGE UP
BUN SERPL-MCNC: 6 MG/DL — LOW (ref 7–23)
BUN SERPL-MCNC: SIGNIFICANT CHANGE UP MG/DL (ref 7–23)
C DIFF GDH STL QL: NEGATIVE — SIGNIFICANT CHANGE UP
C DIFF GDH STL QL: SIGNIFICANT CHANGE UP
CALCIUM SERPL-MCNC: 8.7 MG/DL — SIGNIFICANT CHANGE UP (ref 8.4–10.5)
CALCIUM SERPL-MCNC: SIGNIFICANT CHANGE UP MG/DL (ref 8.4–10.5)
CAST: 3 /LPF — SIGNIFICANT CHANGE UP (ref 0–4)
CHLORIDE SERPL-SCNC: 104 MMOL/L — SIGNIFICANT CHANGE UP (ref 96–108)
CHLORIDE SERPL-SCNC: 106 MMOL/L — SIGNIFICANT CHANGE UP (ref 96–108)
CO2 SERPL-SCNC: 25 MMOL/L — SIGNIFICANT CHANGE UP (ref 22–31)
CO2 SERPL-SCNC: 29 MMOL/L — SIGNIFICANT CHANGE UP (ref 22–31)
COLOR SPEC: YELLOW — SIGNIFICANT CHANGE UP
CREAT SERPL-MCNC: 0.6 MG/DL — SIGNIFICANT CHANGE UP (ref 0.5–1.3)
CREAT SERPL-MCNC: 0.71 MG/DL — SIGNIFICANT CHANGE UP (ref 0.5–1.3)
DIFF PNL FLD: ABNORMAL
EGFR: 94 ML/MIN/1.73M2 — SIGNIFICANT CHANGE UP
EGFR: 99 ML/MIN/1.73M2 — SIGNIFICANT CHANGE UP
EOSINOPHIL # BLD AUTO: 0.16 K/UL — SIGNIFICANT CHANGE UP (ref 0–0.5)
EOSINOPHIL NFR BLD AUTO: 3 % — SIGNIFICANT CHANGE UP (ref 0–6)
ESTIMATED AVERAGE GLUCOSE: 137 MG/DL — HIGH (ref 68–114)
GI PCR PANEL: SIGNIFICANT CHANGE UP
GLUCOSE BLDC GLUCOMTR-MCNC: 101 MG/DL — HIGH (ref 70–99)
GLUCOSE BLDC GLUCOMTR-MCNC: 75 MG/DL — SIGNIFICANT CHANGE UP (ref 70–99)
GLUCOSE BLDC GLUCOMTR-MCNC: 93 MG/DL — SIGNIFICANT CHANGE UP (ref 70–99)
GLUCOSE BLDC GLUCOMTR-MCNC: 97 MG/DL — SIGNIFICANT CHANGE UP (ref 70–99)
GLUCOSE SERPL-MCNC: 79 MG/DL — SIGNIFICANT CHANGE UP (ref 70–99)
GLUCOSE SERPL-MCNC: 85 MG/DL — SIGNIFICANT CHANGE UP (ref 70–99)
GLUCOSE UR QL: NEGATIVE MG/DL — SIGNIFICANT CHANGE UP
HCT VFR BLD CALC: 40.9 % — SIGNIFICANT CHANGE UP (ref 39–50)
HGB BLD-MCNC: 12.6 G/DL — LOW (ref 13–17)
IMM GRANULOCYTES NFR BLD AUTO: 0.4 % — SIGNIFICANT CHANGE UP (ref 0–0.9)
KETONES UR-MCNC: ABNORMAL MG/DL
LEUKOCYTE ESTERASE UR-ACNC: ABNORMAL
LYMPHOCYTES # BLD AUTO: 0.9 K/UL — LOW (ref 1–3.3)
LYMPHOCYTES # BLD AUTO: 16.8 % — SIGNIFICANT CHANGE UP (ref 13–44)
MAGNESIUM SERPL-MCNC: 1.8 MG/DL — SIGNIFICANT CHANGE UP (ref 1.6–2.6)
MAGNESIUM SERPL-MCNC: 1.9 MG/DL — SIGNIFICANT CHANGE UP (ref 1.6–2.6)
MCHC RBC-ENTMCNC: 27.6 PG — SIGNIFICANT CHANGE UP (ref 27–34)
MCHC RBC-ENTMCNC: 30.8 GM/DL — LOW (ref 32–36)
MCV RBC AUTO: 89.5 FL — SIGNIFICANT CHANGE UP (ref 80–100)
MONOCYTES # BLD AUTO: 0.57 K/UL — SIGNIFICANT CHANGE UP (ref 0–0.9)
MONOCYTES NFR BLD AUTO: 10.7 % — SIGNIFICANT CHANGE UP (ref 2–14)
NEUTROPHILS # BLD AUTO: 3.65 K/UL — SIGNIFICANT CHANGE UP (ref 1.8–7.4)
NEUTROPHILS NFR BLD AUTO: 68.2 % — SIGNIFICANT CHANGE UP (ref 43–77)
NITRITE UR-MCNC: NEGATIVE — SIGNIFICANT CHANGE UP
NRBC # BLD: 0 /100 WBCS — SIGNIFICANT CHANGE UP (ref 0–0)
PH UR: 7.5 — SIGNIFICANT CHANGE UP (ref 5–8)
PHOSPHATE SERPL-MCNC: 3.4 MG/DL — SIGNIFICANT CHANGE UP (ref 2.5–4.5)
PHOSPHATE SERPL-MCNC: SIGNIFICANT CHANGE UP MG/DL (ref 2.5–4.5)
PLATELET # BLD AUTO: 206 K/UL — SIGNIFICANT CHANGE UP (ref 150–400)
POTASSIUM SERPL-MCNC: 3.5 MMOL/L — SIGNIFICANT CHANGE UP (ref 3.5–5.3)
POTASSIUM SERPL-MCNC: SIGNIFICANT CHANGE UP MMOL/L (ref 3.5–5.3)
POTASSIUM SERPL-SCNC: 3.5 MMOL/L — SIGNIFICANT CHANGE UP (ref 3.5–5.3)
POTASSIUM SERPL-SCNC: SIGNIFICANT CHANGE UP MMOL/L (ref 3.5–5.3)
PROT SERPL-MCNC: 6.4 G/DL — SIGNIFICANT CHANGE UP (ref 6–8.3)
PROT UR-MCNC: 30 MG/DL
RBC # BLD: 4.57 M/UL — SIGNIFICANT CHANGE UP (ref 4.2–5.8)
RBC # FLD: 13.3 % — SIGNIFICANT CHANGE UP (ref 10.3–14.5)
RBC CASTS # UR COMP ASSIST: 53 /HPF — HIGH (ref 0–4)
SODIUM SERPL-SCNC: 140 MMOL/L — SIGNIFICANT CHANGE UP (ref 135–145)
SODIUM SERPL-SCNC: 141 MMOL/L — SIGNIFICANT CHANGE UP (ref 135–145)
SP GR SPEC: 1.01 — SIGNIFICANT CHANGE UP (ref 1–1.03)
SQUAMOUS # UR AUTO: 3 /HPF — SIGNIFICANT CHANGE UP (ref 0–5)
UROBILINOGEN FLD QL: 1 MG/DL — SIGNIFICANT CHANGE UP (ref 0.2–1)
WBC # BLD: 5.35 K/UL — SIGNIFICANT CHANGE UP (ref 3.8–10.5)
WBC # FLD AUTO: 5.35 K/UL — SIGNIFICANT CHANGE UP (ref 3.8–10.5)
WBC UR QL: 57 /HPF — HIGH (ref 0–5)

## 2024-10-21 PROCEDURE — 99233 SBSQ HOSP IP/OBS HIGH 50: CPT | Mod: GC

## 2024-10-21 PROCEDURE — 74177 CT ABD & PELVIS W/CONTRAST: CPT | Mod: 26

## 2024-10-21 RX ORDER — SULFAMETHOXAZOLE/TRIMETHOPRIM 800-160 MG
1 TABLET ORAL EVERY 12 HOURS
Refills: 0 | Status: DISCONTINUED | OUTPATIENT
Start: 2024-10-21 | End: 2024-10-21

## 2024-10-21 RX ORDER — POTASSIUM CHLORIDE 10 MEQ
40 TABLET, EXTENDED RELEASE ORAL ONCE
Refills: 0 | Status: COMPLETED | OUTPATIENT
Start: 2024-10-21 | End: 2024-10-21

## 2024-10-21 RX ORDER — MAGNESIUM SULFATE IN 0.9% NACL 2 G/50 ML
2 INTRAVENOUS SOLUTION, PIGGYBACK (ML) INTRAVENOUS ONCE
Refills: 0 | Status: COMPLETED | OUTPATIENT
Start: 2024-10-21 | End: 2024-10-21

## 2024-10-21 RX ORDER — POTASSIUM PHOSPHATE 236; 224 MG/ML; MG/ML
30 INJECTION, SOLUTION INTRAVENOUS ONCE
Refills: 0 | Status: COMPLETED | OUTPATIENT
Start: 2024-10-21 | End: 2024-10-21

## 2024-10-21 RX ORDER — POTASSIUM CHLORIDE 10 MEQ
40 TABLET, EXTENDED RELEASE ORAL DAILY
Refills: 0 | Status: DISCONTINUED | OUTPATIENT
Start: 2024-10-21 | End: 2024-10-21

## 2024-10-21 RX ADMIN — GABAPENTIN 300 MILLIGRAM(S): 300 CAPSULE ORAL at 08:13

## 2024-10-21 RX ADMIN — APIXABAN 10 MILLIGRAM(S): 5 TABLET, FILM COATED ORAL at 17:12

## 2024-10-21 RX ADMIN — FINASTERIDE 5 MILLIGRAM(S): 5 TABLET, FILM COATED ORAL at 13:52

## 2024-10-21 RX ADMIN — Medication 81 MILLIGRAM(S): at 13:52

## 2024-10-21 RX ADMIN — Medication 40 MILLIEQUIVALENT(S): at 13:55

## 2024-10-21 RX ADMIN — POTASSIUM PHOSPHATE 83.33 MILLIMOLE(S): 236; 224 INJECTION, SOLUTION INTRAVENOUS at 02:16

## 2024-10-21 RX ADMIN — GABAPENTIN 300 MILLIGRAM(S): 300 CAPSULE ORAL at 21:52

## 2024-10-21 RX ADMIN — GABAPENTIN 300 MILLIGRAM(S): 300 CAPSULE ORAL at 13:52

## 2024-10-21 RX ADMIN — APIXABAN 10 MILLIGRAM(S): 5 TABLET, FILM COATED ORAL at 08:13

## 2024-10-21 RX ADMIN — Medication 0.4 MILLIGRAM(S): at 21:53

## 2024-10-21 RX ADMIN — Medication 25 GRAM(S): at 01:10

## 2024-10-21 NOTE — CONSULT NOTE ADULT - SUBJECTIVE AND OBJECTIVE BOX
GASTROENTEROLOGY CONSULT NOTE  HPI: 78yo M w/ PMHx of HTN, CAD, DM2, BPH, & peripheral neuropathy, lumbar spinal stenosis - presenting from [facility] for bilateral leg swelling. Patient was discharged from the hospital 3 days prior after admission for weakness. On discharge, patient was sent to the rehabilitation with supplemental O2 (new on discharge). Patient denies any shortness of breath, pleurisy, chest pain, and palpitations. He states his breathing has always felt at baseline, but since his oxygen was low in the hospital they told him he needed it. While in the hospital and while at the rehabilitation patient with minimal mobility.  Patient is a former smoker, smoked less than 1PPD and quit >20 years ago. He endorses a normal colonoscopy within the last year.    Found to have acute DVT of R peroneal vein and PE w/ R heart strain and was started on lovenox initially and then switched to eliquis. General surgery was consulted for pancolonic distention and they recommended GI evaluation.  Patient has been having around 3 episodes of diarrhea daily for around the past 2 weeks. C diff and GI PCR negative. Currently being treated for UTI w/ bactrim.     Allergies    No Known Allergies    Intolerances      Home Medications:  Albuterol (Eqv-ProAir HFA) 90 mcg/inh inhalation aerosol: 2 puff(s) inhaled every 6 hours as needed for  shortness of breath and/or wheezing (11 Oct 2024 04:55)  Aspirin EC 81 mg oral delayed release tablet: 1 tab(s) orally once a day (11 Oct 2024 04:55)  finasteride 5 mg oral tablet: 1 tab(s) orally once a day (at bedtime) (11 Oct 2024 04:55)  gabapentin 300 mg oral capsule: 1 cap(s) orally 3 times a day (11 Oct 2024 04:55)  metFORMIN 500 mg oral tablet: 1 tab(s) orally 2 times a day (11 Oct 2024 04:55)  Ozempic 2 mg/1.5 mL (0.25 mg or 0.5 mg dose) subcutaneous solution: 1 milligram(s) subcutaneously (11 Oct 2024 04:55)  tamsulosin 0.4 mg oral capsule: 1 cap(s) orally once a day (11 Oct 2024 04:55)    MEDICATIONS:  MEDICATIONS  (STANDING):  apixaban 10 milliGRAM(s) Oral every 12 hours  aspirin enteric coated 81 milliGRAM(s) Oral daily  dextrose 5%. 1000 milliLiter(s) (50 mL/Hr) IV Continuous <Continuous>  dextrose 5%. 1000 milliLiter(s) (100 mL/Hr) IV Continuous <Continuous>  dextrose 50% Injectable 25 Gram(s) IV Push once  dextrose 50% Injectable 25 Gram(s) IV Push once  dextrose 50% Injectable 12.5 Gram(s) IV Push once  finasteride 5 milliGRAM(s) Oral daily  gabapentin 300 milliGRAM(s) Oral every 8 hours  glucagon  Injectable 1 milliGRAM(s) IntraMuscular once  insulin lispro (ADMELOG) corrective regimen sliding scale   SubCutaneous every 6 hours  tamsulosin 0.4 milliGRAM(s) Oral at bedtime  trimethoprim  160 mG/sulfamethoxazole 800 mG 1 Tablet(s) Oral every 12 hours    MEDICATIONS  (PRN):  acetaminophen     Tablet .. 650 milliGRAM(s) Oral every 6 hours PRN Temp greater or equal to 38C (100.4F), Mild Pain (1 - 3)  albuterol    90 MICROgram(s) HFA Inhaler 2 Puff(s) Inhalation every 6 hours PRN Shortness of Breath and/or Wheezing  dextrose Oral Gel 15 Gram(s) Oral once PRN Blood Glucose LESS THAN 70 milliGRAM(s)/deciliter    PAST MEDICAL & SURGICAL HISTORY:  HTN (hypertension)      Cirrhosis      CAD (coronary artery disease)      HLD (hyperlipidemia)      T2DM (type 2 diabetes mellitus)      BPH (benign prostatic hyperplasia)      No significant past surgical history        FAMILY HISTORY:    SOCIAL HISTORY:  Tobacco: [ ] Current, [ ] Former, [ ] Never; Pack Years:  Alcohol:  Illicit Drugs:    REVIEW OF SYSTEMS:  All other 10 review of systems is negative unless indicated above.    Vital Signs Last 24 Hrs  T(C): 36.6 (21 Oct 2024 05:28), Max: 37 (20 Oct 2024 13:20)  T(F): 97.9 (21 Oct 2024 05:28), Max: 98.6 (20 Oct 2024 13:20)  HR: 61 (21 Oct 2024 05:28) (61 - 80)  BP: 119/79 (21 Oct 2024 05:28) (115/59 - 125/74)  BP(mean): 82 (20 Oct 2024 16:35) (82 - 96)  RR: 17 (21 Oct 2024 05:28) (16 - 20)  SpO2: 95% (21 Oct 2024 05:28) (92% - 100%)    Parameters below as of 21 Oct 2024 05:28  Patient On (Oxygen Delivery Method): room air        10-20 @ 07:01  -  10-21 @ 07:00  --------------------------------------------------------  IN: 526 mL / OUT: 950 mL / NET: -424 mL        PHYSICAL EXAM:    General: lying in bed, in no acute distress  HEENT: Neck supple, mmm, no jvd  Lungs: Normal respiratory effort, no intercostal retractions  Cardiovascular: regular rate  Abdomen: Soft, non-tender non-distended; No rebound or guarding  Extremities: wwp, no cce  Neurological: TORRES, speech fluent  Skin: Warm and dry. No obvious rash  Rectal: Normal tone, brown stool    LABS:                        12.6   5.35  )-----------( 206      ( 21 Oct 2024 05:30 )             40.9     10-21    see note  |  see note  |  see note  ----------------------------<  see note  see note   |  see note  |  see note    Ca    see note      21 Oct 2024 05:30  Phos  see note     10-21  Mg     see note     10-21          PT/INR - ( 20 Oct 2024 08:30 )   PT: 15.4 sec;   INR: 1.32          PTT - ( 20 Oct 2024 08:30 )  PTT:35.6 sec    Urinalysis with Rflx Culture (collected 21 Oct 2024 02:35)      RADIOLOGY & ADDITIONAL STUDIES:     Reviewed   GASTROENTEROLOGY CONSULT NOTE  HPI: 78yo M w/ PMHx of HTN, CAD, DM2, BPH, & peripheral neuropathy, lumbar spinal stenosis - presenting from [facility] for bilateral leg swelling. Patient was discharged from the hospital 3 days prior after admission for weakness. On discharge, patient was sent to the rehabilitation with supplemental O2 (new on discharge). Patient denies any shortness of breath, pleurisy, chest pain, and palpitations. He states his breathing has always felt at baseline, but since his oxygen was low in the hospital they told him he needed it. While in the hospital and while at the rehabilitation patient with minimal mobility.    Found to have acute DVT of R peroneal vein and PE w/ R heart strain and was started on lovenox initially and then switched to eliquis. General surgery was consulted for pancolonic distention and they recommended GI evaluation.  Patient has been having around 2-3 episodes of soft or watery non-bloody stools daily for around the past 2 weeks. Has had no abdominal pain. Does not think his abdomen is more distended than usual. Has been tolerating diet w/ no issues, no nausea or vomiting, C diff and GI PCR negative. Currently being treated for UTI w/ bactrim. No prior EGD or colonoscopy.     Allergies    No Known Allergies    Intolerances      Home Medications:  Albuterol (Eqv-ProAir HFA) 90 mcg/inh inhalation aerosol: 2 puff(s) inhaled every 6 hours as needed for  shortness of breath and/or wheezing (11 Oct 2024 04:55)  Aspirin EC 81 mg oral delayed release tablet: 1 tab(s) orally once a day (11 Oct 2024 04:55)  finasteride 5 mg oral tablet: 1 tab(s) orally once a day (at bedtime) (11 Oct 2024 04:55)  gabapentin 300 mg oral capsule: 1 cap(s) orally 3 times a day (11 Oct 2024 04:55)  metFORMIN 500 mg oral tablet: 1 tab(s) orally 2 times a day (11 Oct 2024 04:55)  Ozempic 2 mg/1.5 mL (0.25 mg or 0.5 mg dose) subcutaneous solution: 1 milligram(s) subcutaneously (11 Oct 2024 04:55)  tamsulosin 0.4 mg oral capsule: 1 cap(s) orally once a day (11 Oct 2024 04:55)    MEDICATIONS:  MEDICATIONS  (STANDING):  apixaban 10 milliGRAM(s) Oral every 12 hours  aspirin enteric coated 81 milliGRAM(s) Oral daily  dextrose 5%. 1000 milliLiter(s) (50 mL/Hr) IV Continuous <Continuous>  dextrose 5%. 1000 milliLiter(s) (100 mL/Hr) IV Continuous <Continuous>  dextrose 50% Injectable 25 Gram(s) IV Push once  dextrose 50% Injectable 25 Gram(s) IV Push once  dextrose 50% Injectable 12.5 Gram(s) IV Push once  finasteride 5 milliGRAM(s) Oral daily  gabapentin 300 milliGRAM(s) Oral every 8 hours  glucagon  Injectable 1 milliGRAM(s) IntraMuscular once  insulin lispro (ADMELOG) corrective regimen sliding scale   SubCutaneous every 6 hours  tamsulosin 0.4 milliGRAM(s) Oral at bedtime  trimethoprim  160 mG/sulfamethoxazole 800 mG 1 Tablet(s) Oral every 12 hours    MEDICATIONS  (PRN):  acetaminophen     Tablet .. 650 milliGRAM(s) Oral every 6 hours PRN Temp greater or equal to 38C (100.4F), Mild Pain (1 - 3)  albuterol    90 MICROgram(s) HFA Inhaler 2 Puff(s) Inhalation every 6 hours PRN Shortness of Breath and/or Wheezing  dextrose Oral Gel 15 Gram(s) Oral once PRN Blood Glucose LESS THAN 70 milliGRAM(s)/deciliter    PAST MEDICAL & SURGICAL HISTORY:  HTN (hypertension)      Cirrhosis      CAD (coronary artery disease)      HLD (hyperlipidemia)      T2DM (type 2 diabetes mellitus)      BPH (benign prostatic hyperplasia)      No significant past surgical history        FAMILY HISTORY:    SOCIAL HISTORY:  Tobacco: [ ] Current, [ ] Former, [ ] Never; Pack Years:  Alcohol:  Illicit Drugs:    REVIEW OF SYSTEMS:  All other 10 review of systems is negative unless indicated above.    Vital Signs Last 24 Hrs  T(C): 36.6 (21 Oct 2024 05:28), Max: 37 (20 Oct 2024 13:20)  T(F): 97.9 (21 Oct 2024 05:28), Max: 98.6 (20 Oct 2024 13:20)  HR: 61 (21 Oct 2024 05:28) (61 - 80)  BP: 119/79 (21 Oct 2024 05:28) (115/59 - 125/74)  BP(mean): 82 (20 Oct 2024 16:35) (82 - 96)  RR: 17 (21 Oct 2024 05:28) (16 - 20)  SpO2: 95% (21 Oct 2024 05:28) (92% - 100%)    Parameters below as of 21 Oct 2024 05:28  Patient On (Oxygen Delivery Method): room air        10-20 @ 07:01  -  10-21 @ 07:00  --------------------------------------------------------  IN: 526 mL / OUT: 950 mL / NET: -424 mL        PHYSICAL EXAM:    General: lying in bed, in no acute distress  HEENT: Neck supple, mmm, no jvd  Lungs: Normal respiratory effort, no intercostal retractions  Abdomen: Soft, non-tender, mildly distended, no rebound or guarding   Neurological: TORRES, speech fluent  Skin: Warm and dry. No obvious rash    LABS:                        12.6   5.35  )-----------( 206      ( 21 Oct 2024 05:30 )             40.9     10-21    see note  |  see note  |  see note  ----------------------------<  see note  see note   |  see note  |  see note    Ca    see note      21 Oct 2024 05:30  Phos  see note     10-21  Mg     see note     10-21          PT/INR - ( 20 Oct 2024 08:30 )   PT: 15.4 sec;   INR: 1.32          PTT - ( 20 Oct 2024 08:30 )  PTT:35.6 sec    Urinalysis with Rflx Culture (collected 21 Oct 2024 02:35)      RADIOLOGY & ADDITIONAL STUDIES:     Reviewed

## 2024-10-21 NOTE — PHYSICAL THERAPY INITIAL EVALUATION ADULT - GAIT DEVIATIONS NOTED, PT EVAL
unsteady, no loss of balance, unable to perform further functional activity due to need to have a BM/decreased weight-shifting ability

## 2024-10-21 NOTE — PROGRESS NOTE ADULT - ASSESSMENT
78yo Male pt with PMH of CAD, HTN, T2DM, BPH, and peripheral neuropathy and no PSHx who initially presented to [facility] ED on 10/10 with bilateral lower extremity weakness and was ultimately admitted, diagnosed, and discharged to [facility] with lumbar spinal stenosis with new supplemental O2. During that admission, patient had abdominal distension and diarrhea however GI PCR and CDiff were negative at that time with KUB findings of Mild gaseous distention of colonic loops measuring up to 6.7 cm. Patient represented to [facility] ED 3d after discharge from [facility] with new bilateral lower extremity swelling. ED workup included Bilateral Lower Extremity Venous duplex with findings of acute DVT of right peroneal vein and CTA PE with findings of Large right main pulmonary artery embolism with associated right heart   strain. Patient was started on eliquis, admitted to telemetry, and stepped down to regional today. Patient continues to endorse nonbloody diarrhea x3 daily with bloating and mild diffuse abdominal pain. AXR was obtained with findings of   Diffuse colonic distention up to 13cm in right abdomen, increased since October 14. General surgery was consulted for colonic distension in the setting of persistent diarrhea. Low suspicion for toxic megacolon as patient is well appearing with normal vitals, laboratory findings, and negative infectious workup. Cannot rule out obstruction or Redford's at this time.    No acute surgical intervention at this time  Recommend GI workup with possible flex sig    Plan discussed with chief resident and attending

## 2024-10-21 NOTE — PROGRESS NOTE ADULT - PROBLEM SELECTOR PLAN 6
Home meds: Weekly Ozempic injections, Metformin 500mg BID. PT states he has never required insulin. Last A1c wnl   - DASH CC diet, mISS  - hold metformin while inpatient  -peripheral neuropathy Iso long-standing DM. Home med: Gabapentin 300mg TID   - c/w gabapentin 300mg TID

## 2024-10-21 NOTE — PHYSICAL THERAPY INITIAL EVALUATION ADULT - GAIT DISTANCE, PT EVAL
1 sidestep to left with right LE, unable to offload left LE to take a step despite having assist to weightshift

## 2024-10-21 NOTE — DISCHARGE NOTE PROVIDER - NSDCFUSCHEDAPPT_GEN_ALL_CORE_FT
[facility]  ORTHOSURG 5 [facility]  Scheduled Appointment: 10/22/2024     [facility]  PULED [address] [place] [address] [address]  Scheduled Appointment: 01/21/2025

## 2024-10-21 NOTE — PROGRESS NOTE ADULT - PROBLEM SELECTOR PLAN 2
extensive adbominal distention + hypertynamic all over; diminished BS  XRay abdomen shows increasing colonic distention 13 cm in diameter  -serial abdominal xrays  -repelte lytes Mag>2, K>4  -CT abdomen/pelvis w/ PO and IV contrast  -cdiff and gi pcr , r/o toxic megacolon   - f/u gen surg recs  -NPO   -to place NGT if worsening for decompression extensive abdominal distention + hypertympanic all over; diminished BS  XRay abdomen shows increasing colonic distention 13 cm in diameter. C-diff and GI PCR negative. CT AP with PO contrast (10.21)- Diffusely air distended colon without a discrete transition point consistent with colonic pseudoobstruction. No pneumoperitoneum. Gen surg recs- NTD surgicaly.  -serial abdominal xrays  -repelte lytes Mag>2, K>4  - r/o toxic megacolon   - f/u GI recs  -NPO   -to place NGT if worsening for decompression extensive abdominal distention + hypertympanic all over; diminished BS  XRay abdomen shows increasing colonic distention 13 cm in diameter. C-diff and GI PCR negative. CT AP with PO contrast (10.21)- Diffusely air distended colon without a discrete transition point consistent with colonic pseudoobstruction. No pneumoperitoneum. Gen surg recs- NTD surgicaly. GI recs- Patient would not benefit from NGT decompression  -serial abdominal exams per GI  -Monitor bowel movements and abdominal distension  -Advance to regular diet

## 2024-10-21 NOTE — PROGRESS NOTE ADULT - SUBJECTIVE AND OBJECTIVE BOX
INTERVAL HPI/OVERNIGHT EVENTS:  Patient was seen and examined at bedside. As per nurse and patient, no o/n events, patient resting comfortably. No complaints at this time. Patient denies: fever, chills, dizziness, weakness, HA, Changes in vision, CP, palpitations, SOB, cough, N/V/D/C, dysuria, changes in bowel movements, LE edema. ROS otherwise negative.    VITAL SIGNS:  T(F): 97.9 (10-21-24 @ 05:28)  HR: 61 (10-21-24 @ 05:28)  BP: 119/79 (10-21-24 @ 05:28)  RR: 17 (10-21-24 @ 05:28)  SpO2: 95% (10-21-24 @ 05:28)  Wt(kg): --      10-20-24 @ 07:01  -  10-21-24 @ 07:00  --------------------------------------------------------  IN: 526 mL / OUT: 950 mL / NET: -424 mL        PHYSICAL EXAM:    Constitutional: resting comfortably in bed; NAD  HEENT: NC/AT, PERRL, EOMI, anicteric sclera, no nasal discharge; uvula midline, no oropharyngeal erythema or exudates; MMM  Neck: supple; no JVD or thyromegaly  Respiratory: CTA B/L; no W/R/R, no retractions  Cardiac: +S1/S2; RRR; no M/R/G; PMI non-displaced  Gastrointestinal: soft, NT/ND; no rebound or guarding; +BSx4  Genitourinary: normal external genitalia  Back: spine midline, no bony tenderness or step-offs; no CVAT B/L  Extremities: WWP, no clubbing or cyanosis; no peripheral edema  Musculoskeletal: NROM x4; no joint swelling, tenderness or erythema  Vascular: 2+ radial, DP/PT pulses B/L  Dermatologic: skin warm, dry and intact; no rashes, wounds, or scars  Lymphatic: no submandibular or cervical LAD  Neurologic: AAOx3; CNII-XII grossly intact; no focal deficits  Psychiatric: affect and characteristics of appearance, verbalizations, behaviors are appropriate, denies SI/HI/AH/VH    MEDICATIONS  (STANDING):  apixaban 10 milliGRAM(s) Oral every 12 hours  aspirin enteric coated 81 milliGRAM(s) Oral daily  dextrose 5%. 1000 milliLiter(s) (50 mL/Hr) IV Continuous <Continuous>  dextrose 5%. 1000 milliLiter(s) (100 mL/Hr) IV Continuous <Continuous>  dextrose 50% Injectable 25 Gram(s) IV Push once  dextrose 50% Injectable 12.5 Gram(s) IV Push once  dextrose 50% Injectable 25 Gram(s) IV Push once  finasteride 5 milliGRAM(s) Oral daily  gabapentin 300 milliGRAM(s) Oral every 8 hours  glucagon  Injectable 1 milliGRAM(s) IntraMuscular once  insulin lispro (ADMELOG) corrective regimen sliding scale   SubCutaneous every 6 hours  tamsulosin 0.4 milliGRAM(s) Oral at bedtime  trimethoprim  160 mG/sulfamethoxazole 800 mG 1 Tablet(s) Oral every 12 hours    MEDICATIONS  (PRN):  acetaminophen     Tablet .. 650 milliGRAM(s) Oral every 6 hours PRN Temp greater or equal to 38C (100.4F), Mild Pain (1 - 3)  albuterol    90 MICROgram(s) HFA Inhaler 2 Puff(s) Inhalation every 6 hours PRN Shortness of Breath and/or Wheezing  dextrose Oral Gel 15 Gram(s) Oral once PRN Blood Glucose LESS THAN 70 milliGRAM(s)/deciliter      Allergies    No Known Allergies    Intolerances        LABS:                        12.6   5.35  )-----------( 206      ( 21 Oct 2024 05:30 )             40.9     10-21    see note  |  see note  |  see note  ----------------------------<  see note  see note   |  see note  |  see note    Ca    see note      21 Oct 2024 05:30  Phos  see note     10-21  Mg     see note     10-21      PT/INR - ( 20 Oct 2024 08:30 )   PT: 15.4 sec;   INR: 1.32          PTT - ( 20 Oct 2024 08:30 )  PTT:35.6 sec  Urinalysis Basic - ( 21 Oct 2024 05:30 )    Color: x / Appearance: x / SG: x / pH: x  Gluc: see note mg/dL / Ketone: x  / Bili: x / Urobili: x   Blood: x / Protein: x / Nitrite: x   Leuk Esterase: x / RBC: x / WBC x   Sq Epi: x / Non Sq Epi: x / Bacteria: x        RADIOLOGY & ADDITIONAL TESTS:  Reviewed INTERVAL HPI/OVERNIGHT EVENTS:  Patient was seen and examined at bedside. As per nurse and patient, no o/n events, patient resting comfortably. No complaints at this time. Patient denies: fever, chills, dizziness, weakness, HA, Changes in vision, CP, palpitations, SOB, cough, N/V/D/C, dysuria, changes in bowel movements, LE edema. ROS otherwise negative. Patient reported 2 bowel movements overnight and still reports diarrhea.     VITAL SIGNS:  T(F): 97.9 (10-21-24 @ 05:28)  HR: 61 (10-21-24 @ 05:28)  BP: 119/79 (10-21-24 @ 05:28)  RR: 17 (10-21-24 @ 05:28)  SpO2: 95% (10-21-24 @ 05:28)  Wt(kg): --      10-20-24 @ 07:01  -  10-21-24 @ 07:00  --------------------------------------------------------  IN: 526 mL / OUT: 950 mL / NET: -424 mL        PHYSICAL EXAM:    Constitutional: resting comfortably in bed; NAD  HEENT: NC/AT, PERRL, EOMI, anicteric sclera, no nasal discharge; uvula midline, no oropharyngeal erythema or exudates; MMM  Neck: supple; no JVD or thyromegaly  Respiratory: CTA B/L; no W/R/R, no retractions  Cardiac: +S1/S2; RRR; no M/R/G; PMI non-displaced  Gastrointestinal: soft, distended No rebound tenderness guarding.   Back: spine midline, no bony tenderness or step-offs; no CVAT B/L  Extremities: WWP, no clubbing or cyanosis; no peripheral edema  Musculoskeletal: NROM x4; no joint swelling, tenderness or erythema  Vascular: 2+ radial, DP/PT pulses B/L  Dermatologic: skin warm, dry and intact; no rashes, wounds, or scars  Lymphatic: no submandibular or cervical LAD  Neurologic: AAOx3; CNII-XII grossly intact; no focal deficits  Psychiatric: affect and characteristics of appearance, verbalizations, behaviors are appropriate, denies SI/HI/AH/VH    MEDICATIONS  (STANDING):  apixaban 10 milliGRAM(s) Oral every 12 hours  aspirin enteric coated 81 milliGRAM(s) Oral daily  dextrose 5%. 1000 milliLiter(s) (50 mL/Hr) IV Continuous <Continuous>  dextrose 5%. 1000 milliLiter(s) (100 mL/Hr) IV Continuous <Continuous>  dextrose 50% Injectable 25 Gram(s) IV Push once  dextrose 50% Injectable 12.5 Gram(s) IV Push once  dextrose 50% Injectable 25 Gram(s) IV Push once  finasteride 5 milliGRAM(s) Oral daily  gabapentin 300 milliGRAM(s) Oral every 8 hours  glucagon  Injectable 1 milliGRAM(s) IntraMuscular once  insulin lispro (ADMELOG) corrective regimen sliding scale   SubCutaneous every 6 hours  tamsulosin 0.4 milliGRAM(s) Oral at bedtime  trimethoprim  160 mG/sulfamethoxazole 800 mG 1 Tablet(s) Oral every 12 hours    MEDICATIONS  (PRN):  acetaminophen     Tablet .. 650 milliGRAM(s) Oral every 6 hours PRN Temp greater or equal to 38C (100.4F), Mild Pain (1 - 3)  albuterol    90 MICROgram(s) HFA Inhaler 2 Puff(s) Inhalation every 6 hours PRN Shortness of Breath and/or Wheezing  dextrose Oral Gel 15 Gram(s) Oral once PRN Blood Glucose LESS THAN 70 milliGRAM(s)/deciliter      Allergies    No Known Allergies    Intolerances        LABS:                        12.6   5.35  )-----------( 206      ( 21 Oct 2024 05:30 )             40.9     10-21    see note  |  see note  |  see note  ----------------------------<  see note  see note   |  see note  |  see note    Ca    see note      21 Oct 2024 05:30  Phos  see note     10-21  Mg     see note     10-21      PT/INR - ( 20 Oct 2024 08:30 )   PT: 15.4 sec;   INR: 1.32          PTT - ( 20 Oct 2024 08:30 )  PTT:35.6 sec  Urinalysis Basic - ( 21 Oct 2024 05:30 )    Color: x / Appearance: x / SG: x / pH: x  Gluc: see note mg/dL / Ketone: x  / Bili: x / Urobili: x   Blood: x / Protein: x / Nitrite: x   Leuk Esterase: x / RBC: x / WBC x   Sq Epi: x / Non Sq Epi: x / Bacteria: x        RADIOLOGY & ADDITIONAL TESTS:  Reviewed

## 2024-10-21 NOTE — DISCHARGE NOTE PROVIDER - HOSPITAL COURSE
#Discharge: do not delete    JOSY PETE is a 79y Male with a past medical history of _____    Presented with _____, found to have _____    Problem List/Main Diagnoses (system-based):   #     #     #    Patient was discharged to: (home/[facility]/acute rehab/hospice, etc. and w/ home health/home PT/RN/home O2)    New medications:   Changes to old medications:  Medications that were stopped:    Items to follow up as outpatient:    Physical exam at the time of discharge:       LABS & STUDIES:   #Discharge: do not delete    JOSY PETE is a 79y Male with a past medical history of HTN, CAD, DM2, BPH, peripheral neuropathy and lumbar spinal stenosis who was sent in from [facility] for bilateral leg swelling admitted for PE, started on lovenox, found to incidentally have increasing colonic distension.    Problem List/Main Diagnoses (system-based):   # Pulmonary Embolism:  Patient presenting w calf swelling, and imaged found to have a R peroneal DVT, subsequently with CTPE study with large right main pulmonary artery embolism with associated right heart strain. Patient was treated with heparin and then transitioned to Elliquis    # Ileus: Extensive abdominal distention found to be pseudoobstruction on CT. Patient was seen by Gen surgery and GI and both servies concluded that there was no intervention necessary in patient. Patient was monitored closely via serial physical exams.    # Diarrhea: Had loose stools in the ED, known history of fecal incontinence; on. Diarrhea was likely due to fecal incontinence and non infectious. C-diff and GI PCR were negative    #Diabetes mellitus: Continue with home regimen  #Benign prostatic hyperplasia: Continue with home regimen: Home meds: Tamsulosin 0.4mg qd, Finasteride 5mg qd. Patient also q6h bladder scan.    #Hypertension: Continue with home medications (Spironolactone 50qd, Losartan 25qd)    # Bilateral leg weakness:  Chronic, seen on prior admission in Sept '24. Likely partially 2/2 spinal nerve compressions and spinal canal stenosis. Patient received PT and OT while in patient. Patient should Follow up with Ortho outpatient (Dr. Arthur)      Patient was discharged to [facility]    New medications: Eliquis 10 mg every 12 hours until October 27th then transition to Eliquis 5mg every 12 hours after  Changes to old medications: none  Medications that were stopped: none     Items to follow up as outpatient:  Follow up with Pulmonology in 3 months   Follow up with Orthopedics Outpatient Dr. Arthur    Physical exam at the time of discharge:       LABS & STUDIES:   #Discharge: do not delete    JOSY PETE is a 79y Male with a past medical history of HTN, CAD, DM2, BPH, peripheral neuropathy and lumbar spinal stenosis who was sent in from [facility] for bilateral leg swelling admitted for PE, started on lovenox, found to incidentally have increasing colonic distension.    Problem List/Main Diagnoses (system-based):   # Pulmonary Embolism:  Patient presenting w calf swelling, and imaged found to have a R peroneal DVT, subsequently with CTPE study with large right main pulmonary artery embolism with associated right heart strain. Patient was treated with heparin and then transitioned to Eliquis    # Ileus: Patient had extensive abdominal distention found to be pseudoobstruction on CT scan. Patient was seen by General surgery and GI and both concluded that there was no intervention necessary in patient. Patient was monitored closely via serial physical exams.    # Diarrhea: Patient Had loose stools in the ED, known history of fecal incontinence; on. Diarrhea was likely due to fecal incontinence and non infectious. C-diff and GI PCR were negative    #Diabetes mellitus: Continue with home regimen  #Benign prostatic hyperplasia: Continue with home regimen: Home meds: Tamsulosin 0.4mg qd, Finasteride 5mg qd. Patient also q6h bladder scan.    #Hypertension: Continue with home medications (Spironolactone 50qd, Losartan 25qd)    # Bilateral leg weakness:  Chronic, seen on prior admission in Sept '24. Likely partially 2/2 spinal nerve compressions and spinal canal stenosis. Patient received PT and OT while in patient. Patient should Follow up with Orthopedics outpatient (Dr. Arthur)      Patient was discharged to [facility]    New medications: Eliquis 10 mg every 12 hours until October 27th then transition to Eliquis 5mg every 12 hours after  Changes to old medications: none  Medications that were stopped: none     Items to follow up as outpatient:  Follow up with Pulmonology in 3 months   Follow up with Orthopedics Outpatient Dr. Arthur    Physical exam at the time of discharge:       LABS & STUDIES:   #Discharge: do not delete    JOSY PETE is a 79y Male with a past medical history of HTN, CAD, DM2, BPH, peripheral neuropathy and lumbar spinal stenosis who was sent in from [facility] for bilateral leg swelling admitted for PE, started on lovenox, found to incidentally have increasing colonic distension.    Problem List/Main Diagnoses (system-based):   # Pulmonary Embolism:  Patient presenting w calf swelling, and imaged found to have a R peroneal DVT, subsequently with CTPE study with large right main pulmonary artery embolism with associated right heart strain. Patient was treated with heparin and then transitioned to Eliquis    # Ileus: Patient had extensive abdominal distention found to be pseudoobstruction on CT scan. Patient was seen by General surgery and GI and both concluded that there was no intervention necessary in patient. Patient was monitored closely via serial physical exams.    # Diarrhea: Patient Had loose stools in the ED, known history of fecal incontinence; on. Diarrhea was likely due to fecal incontinence and non infectious. C-diff and GI PCR were negative    #Diabetes mellitus: Continue with home regimen  #Benign prostatic hyperplasia: Continue with home regimen: Home meds: Tamsulosin 0.4mg qd, Finasteride 5mg qd. Patient also q6h bladder scan.    #Hypertension: Continue with home medications (Spironolactone 50qd, Losartan 25qd)    # Bilateral leg weakness:  Chronic, seen on prior admission in Sept '24. Likely partially 2/2 spinal nerve compressions and spinal canal stenosis. Patient received PT and OT while in patient. Patient should Follow up with Orthopedics outpatient (Dr. Arthur)      Patient was discharged to [facility]    New medications: Eliquis 10 mg every 12 hours until October 27th then transition to Eliquis 5mg every 12 hours after  Changes to old medications: none  Medications that were stopped: none     Items to follow up as outpatient:  Follow up with Pulmonology in 3 months     Physical exam at the time of discharge:   Constitutional: resting comfortably in bed; NAD  HEENT: NC/AT, PERRL, EOMI, anicteric sclera, no nasal discharge; uvula midline, no oropharyngeal erythema or exudates; MMM  Neck: supple; no JVD or thyromegaly  Respiratory: CTA B/L; no W/R/R, no retractions  Cardiac: +S1/S2; RRR; no M/R/G; PMI non-displaced  Gastrointestinal: soft, distended No rebound tenderness guarding.   Extremities: WWP, no clubbing or cyanosis; no peripheral edema  Musculoskeletal: NROM x4; no joint swelling, tenderness or erythema  Vascular: 2+ radial, DP/PT pulses B/L  Neurologic: AAOx3; CNII-XII grossly intact; no focal deficits    LABS & STUDIES:                          12.6   5.45  )-----------( 249      ( 22 Oct 2024 05:30 )             40.6     10-22    145  |  108  |  8   ----------------------------<  94  3.6   |  26  |  0.69    Ca    8.7      22 Oct 2024 05:30  Phos  2.7     10-22  Mg     1.9     10-22    TPro  6.3  /  Alb  3.2[L]  /  TBili  0.3  /  DBili  x   /  AST  20  /  ALT  22  /  AlkPhos  48  10-22      Urinalysis Basic - ( 22 Oct 2024 05:30 )    Color: x / Appearance: x / SG: x / pH: x  Gluc: 94 mg/dL / Ketone: x  / Bili: x / Urobili: x   Blood: x / Protein: x / Nitrite: x   Leuk Esterase: x / RBC: x / WBC x   Sq Epi: x / Non Sq Epi: x / Bacteria: x                RADIOLOGY, EKG & ADDITIONAL TESTS: Reviewed. #Discharge: do not delete    JOSY PETE is a 79y Male with a past medical history of HTN, CAD, DM2, BPH, peripheral neuropathy and lumbar spinal stenosis who was sent in from [facility] for bilateral leg swelling admitted for PE, started on lovenox, found to incidentally have increasing colonic distension.    Problem List/Main Diagnoses (system-based):   # Pulmonary Embolism:  Patient presenting w calf swelling, and imaged found to have a R peroneal DVT, subsequently with CTPE study with large right main pulmonary artery embolism with associated right heart strain. Patient was treated with heparin and then transitioned to Eliquis    # Ileus: Patient had extensive abdominal distention found to be pseudoobstruction on CT scan. Patient was seen by General surgery and GI and both concluded that there was no intervention necessary in patient. Patient was monitored closely via serial physical exams.    # Diarrhea: Patient Had loose stools in the ED, known history of fecal incontinence; on. Diarrhea was likely due to fecal incontinence and non infectious. C-diff and GI PCR were negative    #Diabetes mellitus: Continue with home regimen  #Benign prostatic hyperplasia: Continue with home regimen: Home meds: Tamsulosin 0.4mg qd, Finasteride 5mg qd. Patient also q6h bladder scan.    #Hypertension: Continue with home medications (Spironolactone 50qd, Losartan 25qd)    # Bilateral leg weakness:  Chronic, seen on prior admission in Sept '24. Likely partially 2/2 spinal nerve compressions and spinal canal stenosis. Patient received PT and OT while in patient. Patient should Follow up with Orthopedics outpatient (Dr. Arthur)    # Urinary retention  Patient noted to have intermittent urinary retention on admission. Suspect 2/2 ileus.   - c/t monitor bladder scans and straight cath/felder as indicated      Patient was discharged to [facility]    New medications: Eliquis 10 mg every 12 hours until October 27th then transition to Eliquis 5mg every 12 hours after  Changes to old medications: none  Medications that were stopped: none     Items to follow up as outpatient:  Follow up with Pulmonology in 3 months     Physical exam at the time of discharge:   Constitutional: resting comfortably in bed; NAD  HEENT: NC/AT, PERRL, EOMI, anicteric sclera, no nasal discharge; uvula midline, no oropharyngeal erythema or exudates; MMM  Neck: supple; no JVD or thyromegaly  Respiratory: CTA B/L; no W/R/R, no retractions  Cardiac: +S1/S2; RRR; no M/R/G; PMI non-displaced  Gastrointestinal: soft, distended No rebound tenderness guarding.   Extremities: WWP, no clubbing or cyanosis; no peripheral edema  Musculoskeletal: NROM x4; no joint swelling, tenderness or erythema  Vascular: 2+ radial, DP/PT pulses B/L  Neurologic: AAOx3; CNII-XII grossly intact; no focal deficits    LABS & STUDIES:                          12.6   5.45  )-----------( 249      ( 22 Oct 2024 05:30 )             40.6     10-22    145  |  108  |  8   ----------------------------<  94  3.6   |  26  |  0.69    Ca    8.7      22 Oct 2024 05:30  Phos  2.7     10-22  Mg     1.9     10-22    TPro  6.3  /  Alb  3.2[L]  /  TBili  0.3  /  DBili  x   /  AST  20  /  ALT  22  /  AlkPhos  48  10-22      Urinalysis Basic - ( 22 Oct 2024 05:30 )    Color: x / Appearance: x / SG: x / pH: x  Gluc: 94 mg/dL / Ketone: x  / Bili: x / Urobili: x   Blood: x / Protein: x / Nitrite: x   Leuk Esterase: x / RBC: x / WBC x   Sq Epi: x / Non Sq Epi: x / Bacteria: x                RADIOLOGY, EKG & ADDITIONAL TESTS: Reviewed.

## 2024-10-21 NOTE — PHYSICAL THERAPY INITIAL EVALUATION ADULT - GENERAL OBSERVATIONS, REHAB EVAL
patient received semi-supine with no acute distress. +heplock +[place] catheter, no c/o pain, no PRITCHARD noted during PT IE

## 2024-10-21 NOTE — CHART NOTE - NSCHARTNOTEFT_GEN_A_CORE
01:30AM RN called, pt yelled that he wanted to go home; poss new change in mental status. Spoke to pt at bedside. Pt states he wanted to go home because he didn't want to get poked; RN was putting IV at bedside during this encounter. Reports feeling a bit confused. Denies HA, visual changes, CP, SOB, N/V, abd pain, dysuria, frequency. On exam, a/ox2 (self, place), answers most of questions appropriately, moving all extremities spontaneously, CN II-XII intact. Will order UA w/ reflex cx as pt had (+)UA and ucx w/ Coag neg staph and actinomyces neuii on 10/10/24. Pt is cooperative throughout the encounter. 01:30AM RN called, pt yelled that he wanted to go home; poss new change in mental status. Spoke to pt at bedside. Pt states he wanted to go home because he didn't want to get poked; RN was putting IV at bedside during this encounter. Reports feeling a bit confused. Denies HA, visual changes, CP, SOB, N/V, abd pain, dysuria, frequency. On exam, a/ox2 (self, place), answers most of questions appropriately, moving all extremities spontaneously, CN II-XII intact. Will order UA w/ reflex cx as pt had (+)UA and ucx w/ Coag neg staph and actinomyces neuii on 10/10/24; not treated during the last hospitalization as pt did not have any sx/sign of infection. Will also obtain bladder to make sure pt is not retaining. Pt is cooperative throughout the encounter.

## 2024-10-21 NOTE — CONSULT NOTE ADULT - ASSESSMENT
80yo M w/ PMHx of HTN, CAD, DM2, BPH, & peripheral neuropathy, lumbar spinal stenosis who presented w/ bilateral leg swelling and was found to have acute DVT and PE and was started on apixaban and course c/b UTI currently being treated w/ bactrim and GI was consulted for diffusely distended colon findings on CT concerning for psuedo-obstruction:    #Possible colonic pseudo-obstruction   - Patient has been tolerating diet w/ no nausea, vomiting, or abdominal pain. Has been having BMs 2-3x per day, is passing flatus. Patient has no signs of obstruction at this time. Patient has been minimally mobile recently.   - Close monitoring of electrolytes, replete PRN  - PT eval, ambulate as much as able   - Serial abdominal exams  - Avoid opioids   - No plan for endoscopic evaluation at this time, do not think patient would benefit from colonic decompression currently    Case not yet discussed w/ GI attending Dr. Gilman, recommendations NOT final   80yo M w/ PMHx of HTN, CAD, DM2, BPH, & peripheral neuropathy, lumbar spinal stenosis who presented w/ bilateral leg swelling and was found to have acute DVT and PE and was started on apixaban and course c/b UTI currently being treated w/ bactrim and GI was consulted for diffusely distended colon findings on CT concerning for pseudo-obstruction    #Possible colonic pseudo-obstruction   - Patient has been tolerating diet w/ no nausea, vomiting, or abdominal pain. Has been having BMs 2-3x per day, is passing flatus. Patient has no signs of obstruction at this time. Patient has been minimally mobile recently.   - Close monitoring of electrolytes, replete PRN  - PT eval, ambulate as much as able   - Serial abdominal exams  - Avoid opioids   - No plan for endoscopic evaluation at this time, do not think patient would benefit from colonic decompression currently  - Advance diet as tolerated     Case discussed w/ GI attending Dr. Mukul Chandler MD  PGY-4 GI Fellow  GI consult pager (M-F [id number]): [phone]  Please call  for on-call fellow after hours

## 2024-10-21 NOTE — PROGRESS NOTE ADULT - PROBLEM SELECTOR PLAN 8
Chronic, seen on prior admission in Sept '24. Likely partially 2/2 spinal nerve compressions and spinal canal stenosis. Was discharged in Sept '24 w/ plan for vestibular rehab - was not able to go because of insurance issues.  On last admission MR Lumbar spine w/ L4-L5 spinal canal stenosis, L5-S1 nerve root compression, NO cord compression   - has outpatient ortho follow up w/ Dr Arthur  - PT/OT while inpatient

## 2024-10-21 NOTE — DISCHARGE NOTE PROVIDER - NSDCCPCAREPLAN_GEN_ALL_CORE_FT
PRINCIPAL DISCHARGE DIAGNOSIS  Diagnosis: Pulmonary embolism  Assessment and Plan of Treatment: When you were in the hospital you were found to have a pulmonary embolism which is a BLOOD CLOT IN THE LUNGS. We started you on a blood thinner.   1. Please continue to take eliquis 10mg TWICE a day until      SECONDARY DISCHARGE DIAGNOSES  Diagnosis: Pulmonary embolism  Assessment and Plan of Treatment:      PRINCIPAL DISCHARGE DIAGNOSIS  Diagnosis: Pulmonary embolism  Assessment and Plan of Treatment: When you were in the hospital you were found to have a pulmonary embolism which is a BLOOD CLOT IN THE LUNGS. We started you on a blood thinner.   1. Please continue to take eliquis 10mg TWICE a day until 10/30  2. Please switch to 5mg BID on 10/31 for at least 3 months, until your follow-up appointment with Pulmonology on 1/21  If you start to experience increasing difficulty breathing, bloody sputum, or chest pain, please return to an emergency room.      SECONDARY DISCHARGE DIAGNOSES  Diagnosis: Pulmonary embolism  Assessment and Plan of Treatment:

## 2024-10-21 NOTE — DISCHARGE NOTE PROVIDER - PROVIDER TOKENS
PROVIDER:[TOKEN:[[id number]],FOLLOWUP:[Routine]],PROVIDER:[TOKEN:[[id number]],FOLLOWUP:[2 weeks]] PROVIDER:[TOKEN:[[id number]],SCHEDULEDAPPT:[01/21/2025],SCHEDULEDAPPTTIME:[10:30 AM]],PROVIDER:[TOKEN:[[id number]],FOLLOWUP:[2 weeks]]

## 2024-10-21 NOTE — DISCHARGE NOTE PROVIDER - CARE PROVIDER_API CALL
Nickie Watts  Pulmonary Disease  [address]  [place] [address]  Phone: [phone]  Fax: [phone]  Follow Up Time: Huber Ortega  [address]  [place] [address]  Phone: [phone]  Fax: [phone]  Follow Up Time: 2 weeks   Nickie Watts  Pulmonary Disease  [address]  [place] [address]  Phone: [phone]  Fax: [phone]  Scheduled Appointment: 01/21/2025 10:30 AM    Huber Engle  [address]  [place] [address]  Phone: [phone]  Fax: [phone]  Follow Up Time: 2 weeks

## 2024-10-21 NOTE — DISCHARGE NOTE PROVIDER - NSDCMRMEDTOKEN_GEN_ALL_CORE_FT
Albuterol (Eqv-ProAir HFA) 90 mcg/inh inhalation aerosol: 2 puff(s) inhaled every 6 hours as needed for  shortness of breath and/or wheezing  Aspirin EC 81 mg oral delayed release tablet: 1 tab(s) orally once a day  finasteride 5 mg oral tablet: 1 tab(s) orally once a day (at bedtime)  gabapentin 300 mg oral capsule: 1 cap(s) orally 3 times a day  metFORMIN 500 mg oral tablet: 1 tab(s) orally 2 times a day  Ozempic 2 mg/1.5 mL (0.25 mg or 0.5 mg dose) subcutaneous solution: 1 milligram(s) subcutaneously  tamsulosin 0.4 mg oral capsule: 1 cap(s) orally once a day   Albuterol (Eqv-ProAir HFA) 90 mcg/inh inhalation aerosol: 2 puff(s) inhaled every 6 hours as needed for  shortness of breath and/or wheezing  apixaban 5 mg oral tablet: 2 tab(s) orally every 12 hours Take 2 tablets every day for 5 days until 10/27 then take 1 tablet every day  Aspirin EC 81 mg oral delayed release tablet: 1 tab(s) orally once a day  finasteride 5 mg oral tablet: 1 tab(s) orally once a day (at bedtime)  gabapentin 300 mg oral capsule: 1 cap(s) orally 3 times a day  metFORMIN 500 mg oral tablet: 1 tab(s) orally 2 times a day  Ozempic 2 mg/1.5 mL (0.25 mg or 0.5 mg dose) subcutaneous solution: 1 milligram(s) subcutaneously  tamsulosin 0.4 mg oral capsule: 1 cap(s) orally once a day

## 2024-10-21 NOTE — PHYSICAL THERAPY INITIAL EVALUATION ADULT - LEVEL OF CONSCIOUSNESS, REHAB EVAL
however slightly confused, stated he ambulated this morning to bathroom however after further questioning a few min later reported he did not/alert

## 2024-10-21 NOTE — PROGRESS NOTE ADULT - ASSESSMENT
79 year old male with PMHx of HTN, CAD, DM2, BPH, peripheral neuropathy and lumbar spinal stenosis who was sent in from [facility] for bilateral leg swelling admitted for PE, started on lovenox and transitioned to eliquis. Course complicated by colonic distention to 13cm from 6.7 cm on 10/14.

## 2024-10-21 NOTE — PROGRESS NOTE ADULT - SUBJECTIVE AND OBJECTIVE BOX
SUBJECTIVE: 80yo Male pt with PMH of CAD, HTN, T2DM, BPH, and peripheral neuropathy and no PSHx who initially presented to [facility] ED on 10/10 with bilateral lower extremity weakness and was ultimately admitted, diagnosed, and discharged to [facility] with lumbar spinal stenosis with new supplemental O2. During that admission, patient had abdominal distension and diarrhea however GI PCR and CDiff were negative at that time with KUB findings of Mild gaseous distention of colonic loops measuring up to 6.7 cm. Patient represented to [facility] ED 3d after discharge from [facility] with new bilateral lower extremity swelling. ED workup included Bilateral Lower Extremity Venous duplex with findings of acute DVT of right peroneal vein and CTA PE with findings of Large right main pulmonary artery embolism with associated right heart   strain. Patient was started on eliquis, admitted to telemetry, and stepped down to regional today. Patient continues to endorse nonbloody diarrhea x3 daily with bloating and mild diffuse abdominal pain. AXR was obtained with findings of   Diffuse colonic distention up to 13cm in right abdomen, increased since October 14. General surgery was consulted for colonic distension in the setting of persistent diarrhea      MEDICATIONS  (STANDING):  apixaban 10 milliGRAM(s) Oral every 12 hours  aspirin enteric coated 81 milliGRAM(s) Oral daily  dextrose 5%. 1000 milliLiter(s) (50 mL/Hr) IV Continuous <Continuous>  dextrose 5%. 1000 milliLiter(s) (100 mL/Hr) IV Continuous <Continuous>  dextrose 50% Injectable 25 Gram(s) IV Push once  dextrose 50% Injectable 25 Gram(s) IV Push once  dextrose 50% Injectable 12.5 Gram(s) IV Push once  finasteride 5 milliGRAM(s) Oral daily  gabapentin 300 milliGRAM(s) Oral every 8 hours  glucagon  Injectable 1 milliGRAM(s) IntraMuscular once  insulin lispro (ADMELOG) corrective regimen sliding scale   SubCutaneous every 6 hours  tamsulosin 0.4 milliGRAM(s) Oral at bedtime  trimethoprim  160 mG/sulfamethoxazole 800 mG 1 Tablet(s) Oral every 12 hours    MEDICATIONS  (PRN):  acetaminophen     Tablet .. 650 milliGRAM(s) Oral every 6 hours PRN Temp greater or equal to 38C (100.4F), Mild Pain (1 - 3)  albuterol    90 MICROgram(s) HFA Inhaler 2 Puff(s) Inhalation every 6 hours PRN Shortness of Breath and/or Wheezing  dextrose Oral Gel 15 Gram(s) Oral once PRN Blood Glucose LESS THAN 70 milliGRAM(s)/deciliter      Vital Signs Last 24 Hrs  T(C): 36.6 (21 Oct 2024 05:28), Max: 37 (20 Oct 2024 13:20)  T(F): 97.9 (21 Oct 2024 05:28), Max: 98.6 (20 Oct 2024 13:20)  HR: 61 (21 Oct 2024 05:28) (61 - 80)  BP: 119/79 (21 Oct 2024 05:28) (115/59 - 125/74)  BP(mean): 82 (20 Oct 2024 16:35) (82 - 96)  RR: 17 (21 Oct 2024 05:28) (16 - 20)  SpO2: 95% (21 Oct 2024 05:28) (92% - 100%)    Parameters below as of 21 Oct 2024 05:28  Patient On (Oxygen Delivery Method): room air        Physical Exam  General: AAOx3, NAD, laying comfortably in bed  Cardio: S1,S2, No MRG  Pulm: Nonlabored breathing on RA  Abdomen: obese body habitus, soft, NT, moderate distension without rebound or guarding, ITZEL - for blood with brown stool in vault.  Extremities: WWP, peripheral pulses appreciated                  I&O's Detail    20 Oct 2024 07:01  -  21 Oct 2024 07:00  --------------------------------------------------------  IN:    Oral Fluid: 526 mL  Total IN: 526 mL    OUT:    Voided (mL): 950 mL  Total OUT: 950 mL    Total NET: -424 mL          LABS:                        12.6   5.35  )-----------( 206      ( 21 Oct 2024 05:30 )             40.9     10-21    see note  |  see note  |  see note  ----------------------------<  see note  see note   |  see note  |  see note    Ca    see note      21 Oct 2024 05:30  Phos  see note     10-21  Mg     see note     10-21      PT/INR - ( 20 Oct 2024 08:30 )   PT: 15.4 sec;   INR: 1.32          PTT - ( 20 Oct 2024 08:30 )  PTT:35.6 sec  Urinalysis Basic - ( 21 Oct 2024 05:30 )    Color: x / Appearance: x / SG: x / pH: x  Gluc: see note mg/dL / Ketone: x  / Bili: x / Urobili: x   Blood: x / Protein: x / Nitrite: x   Leuk Esterase: x / RBC: x / WBC x   Sq Epi: x / Non Sq Epi: x / Bacteria: x        RADIOLOGY & ADDITIONAL STUDIES:

## 2024-10-21 NOTE — PROGRESS NOTE ADULT - PROBLEM SELECTOR PLAN 5
Had loose stools in the ED, known history of fecal incontinence; on last admission ruled out infectious causes negative GIPCR and negative c diff. + abdominal distension. last admission KUB suggestive of possible colonic ileus but no stool burden and patient is moving bowels. likely due to fecal incontinence and non infectious.  - continue to monitor electrolytes and replete as needed   - CTM diarrheal output with hydration  -f/u cdiff and gi pcr Had loose stools in the ED, known history of fecal incontinence; on last admission ruled out infectious causes negative GIPCR and negative c diff. + abdominal distension. last admission KUB suggestive of possible colonic ileus but no stool burden and patient is moving bowels. likely due to fecal incontinence and non infectious. C-diff and GI PCR negative  - continue to monitor electrolytes and replete as needed   - CTM diarrheal output with hydration Had loose stools in the ED, known history of fecal incontinence; on last admission ruled out infectious causes negative GIPCR and negative c diff. + abdominal distension. last admission KUB suggestive of possible colonic ileus but no stool burden and patient is moving bowels. likely due to fecal incontinence and non infectious. C-diff and GI PCR negative  - continue to monitor electrolytes and replete as needed   - CTM diarrheal output with hydration  -Quantify and qualify bowel movements.

## 2024-10-21 NOTE — PHYSICAL THERAPY INITIAL EVALUATION ADULT - PERTINENT HX OF CURRENT PROBLEM, REHAB EVAL
79 year old male sent in from [facility] for bilateral leg swelling admitted for PE, started on lovenox and transitioned to eliquis. Course complicated by colonic distention to 13cm from 6.7 cm on 10/14.

## 2024-10-22 ENCOUNTER — APPOINTMENT (OUTPATIENT)
Dept: ORTHOPEDIC SURGERY | Facility: CLINIC | Age: 79
End: 2024-10-22

## 2024-10-22 LAB
ALBUMIN SERPL ELPH-MCNC: 3.2 G/DL — LOW (ref 3.3–5)
ALP SERPL-CCNC: 48 U/L — SIGNIFICANT CHANGE UP (ref 40–120)
ALT FLD-CCNC: 22 U/L — SIGNIFICANT CHANGE UP (ref 10–45)
ANION GAP SERPL CALC-SCNC: 11 MMOL/L — SIGNIFICANT CHANGE UP (ref 5–17)
AST SERPL-CCNC: 20 U/L — SIGNIFICANT CHANGE UP (ref 10–40)
BILIRUB SERPL-MCNC: 0.3 MG/DL — SIGNIFICANT CHANGE UP (ref 0.2–1.2)
BUN SERPL-MCNC: 8 MG/DL — SIGNIFICANT CHANGE UP (ref 7–23)
CALCIUM SERPL-MCNC: 8.7 MG/DL — SIGNIFICANT CHANGE UP (ref 8.4–10.5)
CHLORIDE SERPL-SCNC: 108 MMOL/L — SIGNIFICANT CHANGE UP (ref 96–108)
CO2 SERPL-SCNC: 26 MMOL/L — SIGNIFICANT CHANGE UP (ref 22–31)
CREAT SERPL-MCNC: 0.69 MG/DL — SIGNIFICANT CHANGE UP (ref 0.5–1.3)
CULTURE RESULTS: SIGNIFICANT CHANGE UP
EGFR: 95 ML/MIN/1.73M2 — SIGNIFICANT CHANGE UP
GLUCOSE BLDC GLUCOMTR-MCNC: 111 MG/DL — HIGH (ref 70–99)
GLUCOSE BLDC GLUCOMTR-MCNC: 114 MG/DL — HIGH (ref 70–99)
GLUCOSE BLDC GLUCOMTR-MCNC: 146 MG/DL — HIGH (ref 70–99)
GLUCOSE BLDC GLUCOMTR-MCNC: 96 MG/DL — SIGNIFICANT CHANGE UP (ref 70–99)
GLUCOSE SERPL-MCNC: 94 MG/DL — SIGNIFICANT CHANGE UP (ref 70–99)
HCT VFR BLD CALC: 40.6 % — SIGNIFICANT CHANGE UP (ref 39–50)
HGB BLD-MCNC: 12.6 G/DL — LOW (ref 13–17)
MAGNESIUM SERPL-MCNC: 1.9 MG/DL — SIGNIFICANT CHANGE UP (ref 1.6–2.6)
MCHC RBC-ENTMCNC: 28.3 PG — SIGNIFICANT CHANGE UP (ref 27–34)
MCHC RBC-ENTMCNC: 31 GM/DL — LOW (ref 32–36)
MCV RBC AUTO: 91.2 FL — SIGNIFICANT CHANGE UP (ref 80–100)
NRBC # BLD: 0 /100 WBCS — SIGNIFICANT CHANGE UP (ref 0–0)
PHOSPHATE SERPL-MCNC: 2.7 MG/DL — SIGNIFICANT CHANGE UP (ref 2.5–4.5)
PLATELET # BLD AUTO: 249 K/UL — SIGNIFICANT CHANGE UP (ref 150–400)
POTASSIUM SERPL-MCNC: 3.6 MMOL/L — SIGNIFICANT CHANGE UP (ref 3.5–5.3)
POTASSIUM SERPL-SCNC: 3.6 MMOL/L — SIGNIFICANT CHANGE UP (ref 3.5–5.3)
PROT SERPL-MCNC: 6.3 G/DL — SIGNIFICANT CHANGE UP (ref 6–8.3)
RBC # BLD: 4.45 M/UL — SIGNIFICANT CHANGE UP (ref 4.2–5.8)
RBC # FLD: 13.7 % — SIGNIFICANT CHANGE UP (ref 10.3–14.5)
SODIUM SERPL-SCNC: 145 MMOL/L — SIGNIFICANT CHANGE UP (ref 135–145)
SPECIMEN SOURCE: SIGNIFICANT CHANGE UP
WBC # BLD: 5.45 K/UL — SIGNIFICANT CHANGE UP (ref 3.8–10.5)
WBC # FLD AUTO: 5.45 K/UL — SIGNIFICANT CHANGE UP (ref 3.8–10.5)

## 2024-10-22 PROCEDURE — 99233 SBSQ HOSP IP/OBS HIGH 50: CPT | Mod: GC

## 2024-10-22 RX ORDER — POTASSIUM CHLORIDE 10 MEQ
40 TABLET, EXTENDED RELEASE ORAL ONCE
Refills: 0 | Status: COMPLETED | OUTPATIENT
Start: 2024-10-22 | End: 2024-10-22

## 2024-10-22 RX ORDER — APIXABAN 5 MG/1
2 TABLET, FILM COATED ORAL
Qty: 120 | Refills: 0
Start: 2024-10-22 | End: 2024-11-20

## 2024-10-22 RX ADMIN — APIXABAN 10 MILLIGRAM(S): 5 TABLET, FILM COATED ORAL at 05:42

## 2024-10-22 RX ADMIN — FINASTERIDE 5 MILLIGRAM(S): 5 TABLET, FILM COATED ORAL at 11:29

## 2024-10-22 RX ADMIN — Medication 0.4 MILLIGRAM(S): at 23:18

## 2024-10-22 RX ADMIN — APIXABAN 10 MILLIGRAM(S): 5 TABLET, FILM COATED ORAL at 18:34

## 2024-10-22 RX ADMIN — Medication 81 MILLIGRAM(S): at 11:29

## 2024-10-22 RX ADMIN — GABAPENTIN 300 MILLIGRAM(S): 300 CAPSULE ORAL at 23:18

## 2024-10-22 RX ADMIN — Medication 40 MILLIEQUIVALENT(S): at 11:29

## 2024-10-22 RX ADMIN — GABAPENTIN 300 MILLIGRAM(S): 300 CAPSULE ORAL at 13:29

## 2024-10-22 RX ADMIN — GABAPENTIN 300 MILLIGRAM(S): 300 CAPSULE ORAL at 05:42

## 2024-10-22 NOTE — PROGRESS NOTE ADULT - ASSESSMENT
79 year old male with PMHx of HTN, CAD, DM2, BPH, peripheral neuropathy and lumbar spinal stenosis who was sent in from [facility] for bilateral leg swelling admitted for PE, started on lovenox and transitioned to eliquis. Course complicated by colonic distention to 13cm from 6.7 cm on 10/14.

## 2024-10-22 NOTE — PROGRESS NOTE ADULT - SUBJECTIVE AND OBJECTIVE BOX
SUBJECTIVE: Patient was evaluated at bedside this AM by general surgery team. Patient is doing well this AM and denies pain, nausea, or emesis. Patient reports consistent flatus and BM x1 (soft formed stool). Patient required multiple straight caths last night.    MEDICATIONS  (STANDING):  apixaban 10 milliGRAM(s) Oral every 12 hours  aspirin enteric coated 81 milliGRAM(s) Oral daily  dextrose 5%. 1000 milliLiter(s) (50 mL/Hr) IV Continuous <Continuous>  dextrose 5%. 1000 milliLiter(s) (100 mL/Hr) IV Continuous <Continuous>  dextrose 50% Injectable 25 Gram(s) IV Push once  dextrose 50% Injectable 25 Gram(s) IV Push once  dextrose 50% Injectable 12.5 Gram(s) IV Push once  finasteride 5 milliGRAM(s) Oral daily  gabapentin 300 milliGRAM(s) Oral every 8 hours  glucagon  Injectable 1 milliGRAM(s) IntraMuscular once  insulin lispro (ADMELOG) corrective regimen sliding scale   SubCutaneous every 6 hours  tamsulosin 0.4 milliGRAM(s) Oral at bedtime    MEDICATIONS  (PRN):  acetaminophen     Tablet .. 650 milliGRAM(s) Oral every 6 hours PRN Temp greater or equal to 38C (100.4F), Mild Pain (1 - 3)  albuterol    90 MICROgram(s) HFA Inhaler 2 Puff(s) Inhalation every 6 hours PRN Shortness of Breath and/or Wheezing  dextrose Oral Gel 15 Gram(s) Oral once PRN Blood Glucose LESS THAN 70 milliGRAM(s)/deciliter      Vital Signs Last 24 Hrs  T(C): 36.7 (22 Oct 2024 05:24), Max: 36.8 (21 Oct 2024 21:34)  T(F): 98.1 (22 Oct 2024 05:24), Max: 98.3 (21 Oct 2024 21:34)  HR: 68 (22 Oct 2024 05:24) (65 - 80)  BP: 115/70 (22 Oct 2024 05:24) (114/71 - 136/74)  BP(mean): 85 (22 Oct 2024 05:24) (85 - 85)  RR: 18 (22 Oct 2024 05:24) (16 - 18)  SpO2: 92% (22 Oct 2024 05:24) (92% - 98%)    Parameters below as of 22 Oct 2024 05:24  Patient On (Oxygen Delivery Method): room air        Physical Exam:  General: NAD, resting comfortably in bed  Pulmonary: Nonlabored breathing, no respiratory distress  Cardiovascular: NSR  Abdominal: soft, NT, moderately distended (improved from prior), no rebound or guarding  Extremities: WWP, normal strength  Neuro: A/O x 3, CNs II-XII grossly intact, no focal deficits, normal motor/sensation  Pulses: palpable distal pulses    I&O's Summary    21 Oct 2024 07:01  -  22 Oct 2024 07:00  --------------------------------------------------------  IN: 660 mL / OUT: 830 mL / NET: -170 mL        LABS:                        12.6   5.35  )-----------( 206      ( 21 Oct 2024 05:30 )             40.9     10-21    141  |  104  |  6[L]  ----------------------------<  79  3.5   |  29  |  0.71    Ca    8.7      21 Oct 2024 10:00  Phos  3.4     10-21  Mg     1.9     10-21    TPro  6.4  /  Alb  3.3  /  TBili  0.4  /  DBili  x   /  AST  16  /  ALT  21  /  AlkPhos  51  10-21    PT/INR - ( 20 Oct 2024 08:30 )   PT: 15.4 sec;   INR: 1.32          PTT - ( 20 Oct 2024 08:30 )  PTT:35.6 sec  Urinalysis Basic - ( 21 Oct 2024 10:00 )    Color: x / Appearance: x / SG: x / pH: x  Gluc: 79 mg/dL / Ketone: x  / Bili: x / Urobili: x   Blood: x / Protein: x / Nitrite: x   Leuk Esterase: x / RBC: x / WBC x   Sq Epi: x / Non Sq Epi: x / Bacteria: x      CAPILLARY BLOOD GLUCOSE      POCT Blood Glucose.: 96 mg/dL (22 Oct 2024 06:41)  POCT Blood Glucose.: 114 mg/dL (22 Oct 2024 03:56)  POCT Blood Glucose.: 101 mg/dL (21 Oct 2024 18:09)  POCT Blood Glucose.: 97 mg/dL (21 Oct 2024 12:16)    LIVER FUNCTIONS - ( 21 Oct 2024 10:00 )  Alb: 3.3 g/dL / Pro: 6.4 g/dL / ALK PHOS: 51 U/L / ALT: 21 U/L / AST: 16 U/L / GGT: x             RADIOLOGY & ADDITIONAL STUDIES:

## 2024-10-22 NOTE — PROGRESS NOTE ADULT - PROBLEM SELECTOR PLAN 2
extensive abdominal distention + hypertympanic all over; diminished BS  XRay abdomen shows increasing colonic distention 13 cm in diameter. C-diff and GI PCR negative. CT AP with PO contrast (10.21)- Diffusely air distended colon without a discrete transition point consistent with colonic pseudoobstruction. No pneumoperitoneum. Gen surg recs- NTD surgicaly. GI recs- Patient would not benefit from NGT decompression  -serial abdominal exams per GI  -Monitor bowel movements and abdominal distension  -Advance to regular diet

## 2024-10-22 NOTE — PROGRESS NOTE ADULT - PROBLEM SELECTOR PLAN 5
Had loose stools in the ED, known history of fecal incontinence; on last admission ruled out infectious causes negative GIPCR and negative c diff. + abdominal distension. last admission KUB suggestive of possible colonic ileus but no stool burden and patient is moving bowels. likely due to fecal incontinence and non infectious. C-diff and GI PCR negative  - continue to monitor electrolytes and replete as needed   - CTM diarrheal output with hydration  -Quantify and qualify bowel movements.

## 2024-10-22 NOTE — PROGRESS NOTE ADULT - ASSESSMENT
80yo Male pt with PMH of CAD, HTN, T2DM, BPH, and peripheral neuropathy and no PSHx who initially presented to [facility] ED on 10/10 with bilateral lower extremity weakness and was ultimately admitted, diagnosed, and discharged to [facility] with lumbar spinal stenosis with new supplemental O2. During that admission, patient had abdominal distension and diarrhea however GI PCR and CDiff were negative at that time with KUB findings of Mild gaseous distention of colonic loops measuring up to 6.7 cm. Patient represented to [facility] ED 3d after discharge from [facility] with new bilateral lower extremity swelling. ED workup included Bilateral Lower Extremity Venous duplex with findings of acute DVT of right peroneal vein and CTA PE with findings of Large right main pulmonary artery embolism with associated right heart strain. Patient was started on eliquis, admitted to telemetry, and stepped down to regional today. Patient continues to endorse nonbloody diarrhea x3 daily with bloating and mild diffuse abdominal pain. AXR was obtained with findings of Diffuse colonic distention up to 13cm in right abdomen, increased since October 14. General surgery was consulted for colonic distension in the setting of persistent diarrhea. Low suspicion for toxic megacolon as patient is well appearing with normal vitals, laboratory findings, and negative infectious workup. CT findings of large amount of colonic gas with mild colonic distention and no discrete transition point, findings consistent with possible pseudoobstruction.    Recommendations:  No acute surgical intervention at this time  Unclear if cathartic bowel function  Would recommend flexible sig to rule out mechanical obstuction  Team 4C will continue to follow  Plan discussed with chief resident and attending

## 2024-10-22 NOTE — PROGRESS NOTE ADULT - SUBJECTIVE AND OBJECTIVE BOX
******INCOMPLETE******    Patient is a 79y old  Male who presents with a chief complaint of bilateral leg swelling (22 Oct 2024 10:02)    OVERNIGHT EVENTS/INTERVAL HPI: No acute events overnight. Patient reported 3x bowel movements overnight and still complains of diarrhea. Patient has been able to tolerate his full diet. Patient denies nausea, vomiting and abdominal pain.     REVIEW OF SYSTEMS:  All other review of systems is negative unless indicated above.    OBJECTIVE:  T(C): 36.6 (10-22-24 @ 09:26), Max: 36.8 (10-21-24 @ 21:34)  HR: 66 (10-22-24 @ 09:26) (66 - 80)  BP: 126/67 (10-22-24 @ 09:26) (114/71 - 126/67)  RR: 18 (10-22-24 @ 09:26) (18 - 18)  SpO2: 94% (10-22-24 @ 09:26) (92% - 98%)  Daily     Daily     Physical Exam:  Constitutional: resting comfortably in bed; NAD  HEENT: NC/AT, PERRL, EOMI, anicteric sclera, no nasal discharge; uvula midline, no oropharyngeal erythema or exudates; MMM  Neck: supple; no JVD or thyromegaly  Respiratory: CTA B/L; no W/R/R, no retractions  Cardiac: +S1/S2; RRR; no M/R/G; PMI non-displaced  Gastrointestinal: soft, distended No rebound tenderness guarding.   Back: spine midline, no bony tenderness or step-offs; no CVAT B/L  Extremities: WWP, no clubbing or cyanosis; no peripheral edema  Musculoskeletal: NROM x4; no joint swelling, tenderness or erythema  Vascular: 2+ radial, DP/PT pulses B/L  Dermatologic: skin warm, dry and intact; no rashes, wounds, or scars  Lymphatic: no submandibular or cervical LAD  Neurologic: AAOx3; CNII-XII grossly intact; no focal deficits  Psychiatric: affect and characteristics of appearance, verbalizations, behaviors are appropriate, denies SI/HI/AH/VH    Medications:  MEDICATIONS  (STANDING):  apixaban 10 milliGRAM(s) Oral every 12 hours  aspirin enteric coated 81 milliGRAM(s) Oral daily  dextrose 5%. 1000 milliLiter(s) (50 mL/Hr) IV Continuous <Continuous>  dextrose 5%. 1000 milliLiter(s) (100 mL/Hr) IV Continuous <Continuous>  dextrose 50% Injectable 25 Gram(s) IV Push once  dextrose 50% Injectable 12.5 Gram(s) IV Push once  dextrose 50% Injectable 25 Gram(s) IV Push once  finasteride 5 milliGRAM(s) Oral daily  gabapentin 300 milliGRAM(s) Oral every 8 hours  glucagon  Injectable 1 milliGRAM(s) IntraMuscular once  insulin lispro (ADMELOG) corrective regimen sliding scale   SubCutaneous every 6 hours  potassium chloride    Tablet ER 40 milliEquivalent(s) Oral once  tamsulosin 0.4 milliGRAM(s) Oral at bedtime    MEDICATIONS  (PRN):  acetaminophen     Tablet .. 650 milliGRAM(s) Oral every 6 hours PRN Temp greater or equal to 38C (100.4F), Mild Pain (1 - 3)  albuterol    90 MICROgram(s) HFA Inhaler 2 Puff(s) Inhalation every 6 hours PRN Shortness of Breath and/or Wheezing  dextrose Oral Gel 15 Gram(s) Oral once PRN Blood Glucose LESS THAN 70 milliGRAM(s)/deciliter      Labs:                        12.6   5.45  )-----------( 249      ( 22 Oct 2024 05:30 )             40.6     10-22    145  |  108  |  8   ----------------------------<  94  3.6   |  26  |  0.69    Ca    8.7      22 Oct 2024 05:30  Phos  2.7     10-22  Mg     1.9     10-22    TPro  6.3  /  Alb  3.2[L]  /  TBili  0.3  /  DBili  x   /  AST  20  /  ALT  22  /  AlkPhos  48  10-22      Urinalysis Basic - ( 22 Oct 2024 05:30 )    Color: x / Appearance: x / SG: x / pH: x  Gluc: 94 mg/dL / Ketone: x  / Bili: x / Urobili: x   Blood: x / Protein: x / Nitrite: x   Leuk Esterase: x / RBC: x / WBC x   Sq Epi: x / Non Sq Epi: x / Bacteria: x          Radiology: Reviewed   Patient is a 79y old  Male who presents with a chief complaint of bilateral leg swelling (22 Oct 2024 10:02)    OVERNIGHT EVENTS/INTERVAL HPI: No acute events overnight. Patient reported 3x bowel movements overnight and still complains of diarrhea. Patient has been able to tolerate his full diet. Patient denies nausea, vomiting and abdominal pain.   Pending RUBY placement tomorrow   REVIEW OF SYSTEMS:  All other review of systems is negative unless indicated above.    OBJECTIVE:  T(C): 36.6 (10-22-24 @ 09:26), Max: 36.8 (10-21-24 @ 21:34)  HR: 66 (10-22-24 @ 09:26) (66 - 80)  BP: 126/67 (10-22-24 @ 09:26) (114/71 - 126/67)  RR: 18 (10-22-24 @ 09:26) (18 - 18)  SpO2: 94% (10-22-24 @ 09:26) (92% - 98%)  Daily     Daily     Physical Exam:  Constitutional: resting comfortably in bed; NAD  HEENT: NC/AT, PERRL, EOMI, anicteric sclera, no nasal discharge; uvula midline, no oropharyngeal erythema or exudates; MMM  Neck: supple; no JVD or thyromegaly  Respiratory: CTA B/L; no W/R/R, no retractions  Cardiac: +S1/S2; RRR; no M/R/G; PMI non-displaced  Gastrointestinal: soft, distended No rebound tenderness guarding.   Back: spine midline, no bony tenderness or step-offs; no CVAT B/L  Extremities: WWP, no clubbing or cyanosis; no peripheral edema  Musculoskeletal: NROM x4; no joint swelling, tenderness or erythema  Vascular: 2+ radial, DP/PT pulses B/L  Dermatologic: skin warm, dry and intact; no rashes, wounds, or scars  Lymphatic: no submandibular or cervical LAD  Neurologic: AAOx3; CNII-XII grossly intact; no focal deficits  Psychiatric: affect and characteristics of appearance, verbalizations, behaviors are appropriate, denies SI/HI/AH/VH    Medications:  MEDICATIONS  (STANDING):  apixaban 10 milliGRAM(s) Oral every 12 hours  aspirin enteric coated 81 milliGRAM(s) Oral daily  dextrose 5%. 1000 milliLiter(s) (50 mL/Hr) IV Continuous <Continuous>  dextrose 5%. 1000 milliLiter(s) (100 mL/Hr) IV Continuous <Continuous>  dextrose 50% Injectable 25 Gram(s) IV Push once  dextrose 50% Injectable 12.5 Gram(s) IV Push once  dextrose 50% Injectable 25 Gram(s) IV Push once  finasteride 5 milliGRAM(s) Oral daily  gabapentin 300 milliGRAM(s) Oral every 8 hours  glucagon  Injectable 1 milliGRAM(s) IntraMuscular once  insulin lispro (ADMELOG) corrective regimen sliding scale   SubCutaneous every 6 hours  potassium chloride    Tablet ER 40 milliEquivalent(s) Oral once  tamsulosin 0.4 milliGRAM(s) Oral at bedtime    MEDICATIONS  (PRN):  acetaminophen     Tablet .. 650 milliGRAM(s) Oral every 6 hours PRN Temp greater or equal to 38C (100.4F), Mild Pain (1 - 3)  albuterol    90 MICROgram(s) HFA Inhaler 2 Puff(s) Inhalation every 6 hours PRN Shortness of Breath and/or Wheezing  dextrose Oral Gel 15 Gram(s) Oral once PRN Blood Glucose LESS THAN 70 milliGRAM(s)/deciliter      Labs:                        12.6   5.45  )-----------( 249      ( 22 Oct 2024 05:30 )             40.6     10-22    145  |  108  |  8   ----------------------------<  94  3.6   |  26  |  0.69    Ca    8.7      22 Oct 2024 05:30  Phos  2.7     10-22  Mg     1.9     10-22    TPro  6.3  /  Alb  3.2[L]  /  TBili  0.3  /  DBili  x   /  AST  20  /  ALT  22  /  AlkPhos  48  10-22      Urinalysis Basic - ( 22 Oct 2024 05:30 )    Color: x / Appearance: x / SG: x / pH: x  Gluc: 94 mg/dL / Ketone: x  / Bili: x / Urobili: x   Blood: x / Protein: x / Nitrite: x   Leuk Esterase: x / RBC: x / WBC x   Sq Epi: x / Non Sq Epi: x / Bacteria: x          Radiology: Reviewed

## 2024-10-22 NOTE — PROGRESS NOTE ADULT - SUBJECTIVE AND OBJECTIVE BOX
GASTROENTEROLOGY PROGRESS NOTE  Patient seen and examined at bedside. Patient w/ no abdominal pain, nausea, or vomiting. Is passing flatus. Had one soft brown BM this morning. Abdomen feels more distended today. Has not been ambulating or sitting in chair.     PERTINENT REVIEW OF SYSTEMS:  CONSTITUTIONAL: No weakness, fevers or chills  HEENT: No visual changes; No vertigo or throat pain   GASTROINTESTINAL: As above.  NEUROLOGICAL: No numbness or weakness  SKIN: No itching, burning, rashes, or lesions     Allergies    No Known Allergies    Intolerances      MEDICATIONS:  MEDICATIONS  (STANDING):  apixaban 10 milliGRAM(s) Oral every 12 hours  aspirin enteric coated 81 milliGRAM(s) Oral daily  dextrose 5%. 1000 milliLiter(s) (50 mL/Hr) IV Continuous <Continuous>  dextrose 5%. 1000 milliLiter(s) (100 mL/Hr) IV Continuous <Continuous>  dextrose 50% Injectable 25 Gram(s) IV Push once  dextrose 50% Injectable 25 Gram(s) IV Push once  dextrose 50% Injectable 12.5 Gram(s) IV Push once  finasteride 5 milliGRAM(s) Oral daily  gabapentin 300 milliGRAM(s) Oral every 8 hours  glucagon  Injectable 1 milliGRAM(s) IntraMuscular once  insulin lispro (ADMELOG) corrective regimen sliding scale   SubCutaneous every 6 hours  tamsulosin 0.4 milliGRAM(s) Oral at bedtime    MEDICATIONS  (PRN):  acetaminophen     Tablet .. 650 milliGRAM(s) Oral every 6 hours PRN Temp greater or equal to 38C (100.4F), Mild Pain (1 - 3)  albuterol    90 MICROgram(s) HFA Inhaler 2 Puff(s) Inhalation every 6 hours PRN Shortness of Breath and/or Wheezing  dextrose Oral Gel 15 Gram(s) Oral once PRN Blood Glucose LESS THAN 70 milliGRAM(s)/deciliter    Vital Signs Last 24 Hrs  T(C): 36.6 (22 Oct 2024 09:26), Max: 36.8 (21 Oct 2024 21:34)  T(F): 97.9 (22 Oct 2024 09:26), Max: 98.3 (21 Oct 2024 21:34)  HR: 66 (22 Oct 2024 09:26) (66 - 80)  BP: 126/67 (22 Oct 2024 09:26) (114/71 - 126/67)  BP(mean): 85 (22 Oct 2024 05:24) (85 - 85)  RR: 18 (22 Oct 2024 09:26) (18 - 18)  SpO2: 94% (22 Oct 2024 09:26) (92% - 98%)    Parameters below as of 22 Oct 2024 09:26  Patient On (Oxygen Delivery Method): room air        10-21 @ 07:01  -  10-22 @ 07:00  --------------------------------------------------------  IN: 660 mL / OUT: 830 mL / NET: -170 mL      PHYSICAL EXAM:    General: lying in bed, in no acute distress  HEENT: MMM, conjunctiva and sclera clear  Gastrointestinal: Soft, distended, non-tender   Skin: Warm and dry. No obvious rash    LABS:                        12.6   5.45  )-----------( 249      ( 22 Oct 2024 05:30 )             40.6     10-22    145  |  108  |  8   ----------------------------<  94  3.6   |  26  |  0.69    Ca    8.7      22 Oct 2024 05:30  Phos  2.7     10-22  Mg     1.9     10-22    TPro  6.3  /  Alb  3.2[L]  /  TBili  0.3  /  DBili  x   /  AST  20  /  ALT  22  /  AlkPhos  48  10-22          Urinalysis Basic - ( 22 Oct 2024 05:30 )    Color: x / Appearance: x / SG: x / pH: x  Gluc: 94 mg/dL / Ketone: x  / Bili: x / Urobili: x   Blood: x / Protein: x / Nitrite: x   Leuk Esterase: x / RBC: x / WBC x   Sq Epi: x / Non Sq Epi: x / Bacteria: x                Urinalysis with Rflx Culture (collected 21 Oct 2024 02:35)    Culture - Urine (collected 21 Oct 2024 02:35)  Source: Clean Catch None  Final Report (22 Oct 2024 07:13):    >=3 organisms. Probable collection contamination.      RADIOLOGY & ADDITIONAL STUDIES:  Reviewed

## 2024-10-22 NOTE — PROGRESS NOTE ADULT - ASSESSMENT
78yo M w/ PMHx of HTN, CAD, DM2, BPH, & peripheral neuropathy, lumbar spinal stenosis who presented w/ bilateral leg swelling and was found to have acute DVT and PE and was started on apixaban and course c/b UTI currently being treated w/ bactrim and GI was consulted for diffusely distended colon findings on CT concerning for pseudo-obstruction:    #Possible colonic pseudo-obstruction   - Patient has been tolerating diet w/ no nausea, vomiting, or abdominal pain. Has been having BMs and is passing flatus. Patient has no signs of obstruction at this time. Patient has been minimally mobile recently.   - Recommend increased ambulation and getting patient out of bed to chair   - Close monitoring of electrolytes, replete PRN  - Serial abdominal exams  - Avoid opioids   - No plan for endoscopic evaluation at this time, do not think patient would benefit from colonic decompression currently  - Advance diet as tolerated   - NG tube decompression if patient develops signs of obstruction     Case discussed w/ GI attending Dr. Mukul Chandler MD  PGY-4 GI Fellow  GI consult pager (M-F 7a-2u): [phone]  Please call  for on-call fellow after hours 78yo M w/ PMHx of HTN, CAD, DM2, BPH, & peripheral neuropathy, lumbar spinal stenosis who presented w/ bilateral leg swelling and was found to have acute DVT and PE and was started on apixaban and course c/b UTI currently being treated w/ bactrim and GI was consulted for diffusely distended colon findings on CT concerning for pseudo-obstruction:    #Possible colonic pseudo-obstruction   - Patient has been tolerating diet w/ no nausea, vomiting, or abdominal pain. Has been having BMs and is passing flatus. Patient has no signs of obstruction at this time. Patient has been minimally mobile recently.   - Recommend increased ambulation and getting patient out of bed to chair to stimulate peristalsis   - Close monitoring of electrolytes, replete PRN  - Serial abdominal exams  - Avoid opioids   - No plan for endoscopic evaluation at this time, do not think patient would benefit from colonic decompression currently  - Advance diet as tolerated   - NG tube decompression if patient develops signs of obstruction     Case discussed w/ GI attending Dr. Mukul Chandler MD  PGY-4 GI Fellow  GI consult pager (M-F 9o-1t): [phone]  Please call  for on-call fellow after hours

## 2024-10-22 NOTE — PROGRESS NOTE ADULT - TIME BILLING
Bedside exam and interview, discussion with family   Reviewed vitals, labs   Discussed patient's plan of care with housestaff   Documentation of encounter
Bedside exam and interview   Reviewed vitals, labs, imaging, hospital course  Discussed patient's plan of care with housestaff   Documentation of encounter
Chart Review. Independent interpretation of diagnostic tests/labs/imaging. Patient encounter. Resident teaching/review.

## 2024-10-23 ENCOUNTER — TRANSCRIPTION ENCOUNTER (OUTPATIENT)
Age: 79
End: 2024-10-23

## 2024-10-23 VITALS
HEART RATE: 61 BPM | OXYGEN SATURATION: 96 % | RESPIRATION RATE: 18 BRPM | SYSTOLIC BLOOD PRESSURE: 122 MMHG | TEMPERATURE: 98 F | DIASTOLIC BLOOD PRESSURE: 77 MMHG

## 2024-10-23 LAB
GLUCOSE BLDC GLUCOMTR-MCNC: 104 MG/DL — HIGH (ref 70–99)
GLUCOSE BLDC GLUCOMTR-MCNC: 81 MG/DL — SIGNIFICANT CHANGE UP (ref 70–99)
GLUCOSE BLDC GLUCOMTR-MCNC: 91 MG/DL — SIGNIFICANT CHANGE UP (ref 70–99)

## 2024-10-23 PROCEDURE — 99239 HOSP IP/OBS DSCHRG MGMT >30: CPT | Mod: GC

## 2024-10-23 RX ADMIN — FINASTERIDE 5 MILLIGRAM(S): 5 TABLET, FILM COATED ORAL at 11:24

## 2024-10-23 RX ADMIN — GABAPENTIN 300 MILLIGRAM(S): 300 CAPSULE ORAL at 06:15

## 2024-10-23 RX ADMIN — Medication 81 MILLIGRAM(S): at 11:24

## 2024-10-23 RX ADMIN — APIXABAN 10 MILLIGRAM(S): 5 TABLET, FILM COATED ORAL at 06:15

## 2024-10-23 NOTE — PROGRESS NOTE ADULT - REASON FOR ADMISSION
bilateral leg swelling

## 2024-10-23 NOTE — PROGRESS NOTE ADULT - ASSESSMENT
80yo M w/ PMHx of HTN, CAD, DM2, BPH, & peripheral neuropathy, lumbar spinal stenosis who presented w/ bilateral leg swelling and was found to have acute DVT and PE and was started on apixaban and course c/b UTI s/p abx and GI was consulted for diffusely distended colon findings on CT concerning for pseudo-obstruction:    Recommendations:  - Recommend increased ambulation and getting patient out of bed to chair to stimulate peristalsis. Currently has not been getting out of bed at all.     - Close monitoring of electrolytes, replete PRN  - Serial abdominal exams  - Avoid opioids   - No plan for endoscopic evaluation at this time  - Regular diet  - NG tube decompression if patient develops signs of obstruction  - GI will sign off at this time. Please re-consult if patient's clinical status changes or you have further questions     Case discussed w/ GI attending Dr. Mukul Chandler MD  PGY-4 GI Fellow  GI consult pager (M-F 7a-6m): [phone]  Please call  for on-call fellow after hours

## 2024-10-23 NOTE — PROGRESS NOTE ADULT - PROBLEM SELECTOR PLAN 5
Had loose stools in the ED, known history of fecal incontinence; on last admission ruled out infectious causes negative GIPCR and negative c diff. + abdominal distension. last admission KUB suggestive of possible colonic ileus but no stool burden and patient is moving bowels. likely due to fecal incontinence and non infectious. C-diff and GI PCR negative  - continue to monitor electrolytes and replete as needed   - CTM diarrheal output with hydration  -Quantify and qualify bowel movements. Had loose stools in the ED, known history of fecal incontinence; on last admission ruled out infectious causes negative GIPCR and negative c diff. + abdominal distension. last admission KUB suggestive of possible colonic ileus but no stool burden and patient is moving bowels. likely due to fecal incontinence and non infectious. C-diff and GI PCR negative. Diarrhea is improving (1 episode now down from 3 episodes)  - continue to monitor electrolytes and replete as needed   - CTM diarrheal output with hydration  -Quantify and qualify bowel movements.

## 2024-10-23 NOTE — DISCHARGE NOTE NURSING/CASE MANAGEMENT/SOCIAL WORK - FINANCIAL ASSISTANCE
[facility] provides services at a reduced cost to those who are determined to be eligible through [facility]’s financial assistance program. Information regarding [facility]’s financial assistance program can be found by going to [email or web address] or by calling [phone].

## 2024-10-23 NOTE — PROGRESS NOTE ADULT - PROBLEM SELECTOR PLAN 9
Fluids: none  Electrolytes: Mg >2, K >4  Nutrition: consistent carb   Prophylaxis: lovenox 100mg BID > Eliquis 10mg BID  Activity: as tolerated  GI: none  C: FC  Dispo: JOANNE

## 2024-10-23 NOTE — PROGRESS NOTE ADULT - ATTENDING COMMENTS
Patient seen with wife and daughter at the bedside, reports feeling fine, denies abdominal pain, no N/V, passing flatus, having BMs. Denies SOB, no chest pain, no reported bleeding or headache. No other complaints or events reported.    General: AO, NAD, lying in bed, speaking in full sentences, no labored breathing on RA  HEENT: AT/NC, no facial asymmetry   Lungs: no crackles, no wheezes  Abdomen: soft, less distended, no tenderness, + BS  Extremities: warm, no edema, no tenderness, no focal deficit     Labs pending    Patient with clinical improvement, no signs of obstruction, diet advanced with good tolerance, no plan for sig flex per GI, appreciate surgery. Follow-up electrolytes. Bladder scan protocol,   Tolerating Apixaban, monitor Hb
Agree with the plan outlined as above.  Provoked PE and DVT in the setting of recent hospitalization and decreased mobility     On room air, comfortable, sats 97%  Obtain ambulatory O2 sats, out of bed to chair, incentive spirometer, ambulate as tolerated.  Can transition to DOAC, follow-up pulmonary in 3 months, will need repeat echo    Pulmonary medicine will sign off.  Please reach out to us with questions.  Thank you for allowing us to participate in the care of the patient
Patient reports feeling fine, denies abdominal pain, no N.V, passing flatus, having BMs. No SOB, no chest pain. No other complaints or events reported.    General: initially sleeping, awakes to voice, no labored breathing, speaking in full sentences,  HEENT: AT/NC, no facial asymmetry  Lungs: no crackles, no wheezes  Heart: regular  Abdomen: soft, no tenderness, no guarding, + BS  Extremities: warm, no edema, no tenderness, following commands, no focal deficit     Patient with improved abdominal distension, no obstipation, tolerating regular diet. Started on AC with good tolerance, no signs of bleeding. Hb stable  RUBY changed per family request.
Patient reports feeling at baseline, denies chest pain,  no SOB, no abdominal pain, no N/V, passing flatus, having BMs. No other complaints or events reported    General: AO, NAD, lying in bed, speaking in full sentences, no labored breathing on RA  HEENT: AT/NC, no facial asymmetry   Lungs: no crackles, no wheezes  Heart: regular  Abdomen: soft, mild distension, no tenderness to palpation, + BS  Extremities: warm, no tenderness, no focal deficit     Labs, imaging reviewed    Patient with new PE, started on Apixaban with good tolerance. Course complicated by  abdominal distension, seen by GI and Surgery. Continue to monitor abdominal exam, will advance diet diet and monitor. Patient reports passing flatus and having BMs. Avoid Opioids, monitor electrolytes   DVT ppx: AC
78 yo M with PMHx HTN, CAD, DM, BPH, peripheral neuropathy, recent admission BLE weakness 2/2 lumbar spinal stenosis/nerve root compression (10/11-10/16) initially BIBEMS from [facility] 10/19 with 5d hx of BLE edema, found to have R peroneal DVT and large R main PE with RHS, admitted to [facility]/telemetry for full AC, now stable for stepdown to [facility] for ongoing management.     At time of stepdown, VSS. EKG, labs, imaging, and infectious work-up reviewed. From a PE/DVT standpoint, pt transitioned off heparin gtt onto full AC with Eliquis. Tolerating well without concerns at this time. Course c/b abdominal pain and diarrhea, persistent/chronic in nature per chart review as pt with similar sx during recent admission s/p largely unremarkable evaluation. New abd XR concerning for dilated loops of bowel up to 13 cm without obvious transition point concerning for possible pseudo-obstruction/[place] vs ileus. Surgery consulted pending further recommendations but pt remains clinically stable at time of stepdown.      [ ] Continue Eliquis (10mg Q12 x7d -> 5mg Q12)   [ ] Pulm/PERT signed off (R main PE + RHS)   - Outpatient follow-up with repeat TTE in 3 months    [ ] Continue to hold anti-HTN (Losartan/Spironolactone)     [ ] Surgery consulted (13 cm dilated bowel loop on XR Abd)   - NPO pending surgery recommendations    - CTAP with PO/IV contrast (R/O obstruction)    - GI PCR, C. Diff PCR (Previously negative 10/15)

## 2024-10-23 NOTE — PROGRESS NOTE ADULT - PROBLEM SELECTOR PLAN 1
-Patient presenting w calf swelling, and imaged found to have a R peroneal DVT, subsequently with CTPE study with large right main pulmonary artery embolism with associated right heart   strain. Left basilar consolidation versus pulmonary infarct. Transverse colon dilation, measuring up to 6.3 cm. PESI III (Class III risk) , SPESI 1 point due to CAD. Troponin and BNP biomarkers re-assuringly within normal limits, CPES score 2 currently, pending formal TTE. S/p eliquis 10mg x 1 in ED  - PERT consulted, no plan for thrombectomy  - started on lovenox 100mg BID; transition to Eliquis in PM  -Provoked PE and DVT in the setting of recent hospitalization and decreased mobility     Now, on room air, comfortable, sats 97%  - incentive spirometer, ambulate as tolerated.  - follow-up pulmonary in 3 months, will need repeat echo to monitor RH strain

## 2024-10-23 NOTE — PROVIDER CONTACT NOTE (OTHER) - SITUATION
Provider notified of pt retaining urine this  and earlier this am 600ml, and needing multiple straight caths

## 2024-10-23 NOTE — PROGRESS NOTE ADULT - PROVIDER SPECIALTY LIST ADULT
Gastroenterology
Gastroenterology
Surgery
Internal Medicine
Internal Medicine
Pulmonology
Surgery
Surgery
Internal Medicine

## 2024-10-23 NOTE — PROGRESS NOTE ADULT - SUBJECTIVE AND OBJECTIVE BOX
GASTROENTEROLOGY PROGRESS NOTE  Patient seen and examined at bedside. BMs slightly more well formed yesterday. No abdominal pain, nausea, or vomiting. Abdomen less distended today.     PERTINENT REVIEW OF SYSTEMS:  CONSTITUTIONAL: No weakness, fevers or chills  HEENT: No visual changes; No vertigo or throat pain   GASTROINTESTINAL: As above.  NEUROLOGICAL: No numbness or weakness  SKIN: No itching, burning, rashes, or lesions     Allergies    No Known Allergies    Intolerances      MEDICATIONS:  MEDICATIONS  (STANDING):  apixaban 10 milliGRAM(s) Oral every 12 hours  aspirin enteric coated 81 milliGRAM(s) Oral daily  dextrose 5%. 1000 milliLiter(s) (50 mL/Hr) IV Continuous <Continuous>  dextrose 5%. 1000 milliLiter(s) (100 mL/Hr) IV Continuous <Continuous>  dextrose 50% Injectable 25 Gram(s) IV Push once  dextrose 50% Injectable 12.5 Gram(s) IV Push once  dextrose 50% Injectable 25 Gram(s) IV Push once  finasteride 5 milliGRAM(s) Oral daily  gabapentin 300 milliGRAM(s) Oral every 8 hours  glucagon  Injectable 1 milliGRAM(s) IntraMuscular once  insulin lispro (ADMELOG) corrective regimen sliding scale   SubCutaneous every 6 hours  tamsulosin 0.4 milliGRAM(s) Oral at bedtime    MEDICATIONS  (PRN):  acetaminophen     Tablet .. 650 milliGRAM(s) Oral every 6 hours PRN Temp greater or equal to 38C (100.4F), Mild Pain (1 - 3)  albuterol    90 MICROgram(s) HFA Inhaler 2 Puff(s) Inhalation every 6 hours PRN Shortness of Breath and/or Wheezing  dextrose Oral Gel 15 Gram(s) Oral once PRN Blood Glucose LESS THAN 70 milliGRAM(s)/deciliter    Vital Signs Last 24 Hrs  T(C): 36.3 (23 Oct 2024 05:17), Max: 36.6 (22 Oct 2024 09:26)  T(F): 97.4 (23 Oct 2024 05:17), Max: 97.9 (22 Oct 2024 09:26)  HR: 72 (23 Oct 2024 05:17) (66 - 72)  BP: 129/72 (23 Oct 2024 05:17) (117/73 - 129/72)  BP(mean): --  RR: 18 (23 Oct 2024 05:17) (18 - 18)  SpO2: 96% (23 Oct 2024 05:17) (94% - 96%)    Parameters below as of 22 Oct 2024 15:19  Patient On (Oxygen Delivery Method): room air        10-23 @ 07:01  -  10-23 @ 09:02  --------------------------------------------------------  IN: 0 mL / OUT: 600 mL / NET: -600 mL      PHYSICAL EXAM:    General: lying in bed, in no acute distress  HEENT: MMM, conjunctiva and sclera clear  Gastrointestinal: Soft non-tender distended (less than yesterday)  Skin: Warm and dry. No obvious rash    LABS:                        12.6   5.45  )-----------( 249      ( 22 Oct 2024 05:30 )             40.6     10-22    145  |  108  |  8   ----------------------------<  94  3.6   |  26  |  0.69    Ca    8.7      22 Oct 2024 05:30  Phos  2.7     10-22  Mg     1.9     10-22    TPro  6.3  /  Alb  3.2[L]  /  TBili  0.3  /  DBili  x   /  AST  20  /  ALT  22  /  AlkPhos  48  10-22          Urinalysis Basic - ( 22 Oct 2024 05:30 )    Color: x / Appearance: x / SG: x / pH: x  Gluc: 94 mg/dL / Ketone: x  / Bili: x / Urobili: x   Blood: x / Protein: x / Nitrite: x   Leuk Esterase: x / RBC: x / WBC x   Sq Epi: x / Non Sq Epi: x / Bacteria: x                Culture - Urine (collected 21 Oct 2024 02:35)  Source: Clean Catch None  Final Report (22 Oct 2024 07:13):    >=3 organisms. Probable collection contamination.    Urinalysis with Rflx Culture (collected 21 Oct 2024 02:35)      RADIOLOGY & ADDITIONAL STUDIES:  Reviewed

## 2024-10-23 NOTE — PROGRESS NOTE ADULT - ASSESSMENT
79 year old male with PMHx of HTN, CAD, DM2, BPH, peripheral neuropathy and lumbar spinal stenosis who was sent in from [facility] for bilateral leg swelling admitted for PE, started on lovenox and transitioned to eliquis. Course complicated by colonic distention to 13cm from 6.7 cm on 10/14.

## 2024-10-23 NOTE — PROGRESS NOTE ADULT - SUBJECTIVE AND OBJECTIVE BOX
SUBJECTIVE: Patient was evaluated at bedside this AM by general surgery team. Patient denies pain, nausea, or emesis. Patient reports flatus and 2 liquid BMs yesterday.    MEDICATIONS  (STANDING):  apixaban 10 milliGRAM(s) Oral every 12 hours  aspirin enteric coated 81 milliGRAM(s) Oral daily  dextrose 5%. 1000 milliLiter(s) (50 mL/Hr) IV Continuous <Continuous>  dextrose 5%. 1000 milliLiter(s) (100 mL/Hr) IV Continuous <Continuous>  dextrose 50% Injectable 25 Gram(s) IV Push once  dextrose 50% Injectable 25 Gram(s) IV Push once  dextrose 50% Injectable 12.5 Gram(s) IV Push once  finasteride 5 milliGRAM(s) Oral daily  gabapentin 300 milliGRAM(s) Oral every 8 hours  glucagon  Injectable 1 milliGRAM(s) IntraMuscular once  insulin lispro (ADMELOG) corrective regimen sliding scale   SubCutaneous every 6 hours  tamsulosin 0.4 milliGRAM(s) Oral at bedtime    MEDICATIONS  (PRN):  acetaminophen     Tablet .. 650 milliGRAM(s) Oral every 6 hours PRN Temp greater or equal to 38C (100.4F), Mild Pain (1 - 3)  albuterol    90 MICROgram(s) HFA Inhaler 2 Puff(s) Inhalation every 6 hours PRN Shortness of Breath and/or Wheezing  dextrose Oral Gel 15 Gram(s) Oral once PRN Blood Glucose LESS THAN 70 milliGRAM(s)/deciliter      Vital Signs Last 24 Hrs  T(C): 36.3 (23 Oct 2024 05:17), Max: 36.6 (22 Oct 2024 09:26)  T(F): 97.4 (23 Oct 2024 05:17), Max: 97.9 (22 Oct 2024 09:26)  HR: 72 (23 Oct 2024 05:17) (66 - 72)  BP: 129/72 (23 Oct 2024 05:17) (117/73 - 129/72)  BP(mean): --  RR: 18 (23 Oct 2024 05:17) (18 - 18)  SpO2: 96% (23 Oct 2024 05:17) (94% - 96%)    Parameters below as of 22 Oct 2024 15:19  Patient On (Oxygen Delivery Method): room air        Physical Exam:  General: NAD, resting comfortably in bed  Pulmonary: Nonlabored breathing, no respiratory distress  Cardiovascular: NSR  Abdominal: soft, NT, moderate distension (improved from prior), no rebound  Extremities: WWP, normal strength  Neuro: A/O x 3, CNs II-XII grossly intact, no focal deficits, normal motor/sensation  Pulses: palpable distal pulses    I&O's Summary    21 Oct 2024 07:01  -  22 Oct 2024 07:00  --------------------------------------------------------  IN: 660 mL / OUT: 830 mL / NET: -170 mL        LABS:                        12.6   5.45  )-----------( 249      ( 22 Oct 2024 05:30 )             40.6     10-22    145  |  108  |  8   ----------------------------<  94  3.6   |  26  |  0.69    Ca    8.7      22 Oct 2024 05:30  Phos  2.7     10-22  Mg     1.9     10-22    TPro  6.3  /  Alb  3.2[L]  /  TBili  0.3  /  DBili  x   /  AST  20  /  ALT  22  /  AlkPhos  48  10-22      Urinalysis Basic - ( 22 Oct 2024 05:30 )    Color: x / Appearance: x / SG: x / pH: x  Gluc: 94 mg/dL / Ketone: x  / Bili: x / Urobili: x   Blood: x / Protein: x / Nitrite: x   Leuk Esterase: x / RBC: x / WBC x   Sq Epi: x / Non Sq Epi: x / Bacteria: x      CAPILLARY BLOOD GLUCOSE      POCT Blood Glucose.: 91 mg/dL (23 Oct 2024 00:09)  POCT Blood Glucose.: 111 mg/dL (22 Oct 2024 17:58)  POCT Blood Glucose.: 146 mg/dL (22 Oct 2024 12:05)  POCT Blood Glucose.: 96 mg/dL (22 Oct 2024 06:41)    LIVER FUNCTIONS - ( 22 Oct 2024 05:30 )  Alb: 3.2 g/dL / Pro: 6.3 g/dL / ALK PHOS: 48 U/L / ALT: 22 U/L / AST: 20 U/L / GGT: x             RADIOLOGY & ADDITIONAL STUDIES:

## 2024-10-23 NOTE — DISCHARGE NOTE NURSING/CASE MANAGEMENT/SOCIAL WORK - PATIENT PORTAL LINK FT
You can access the FollowMyHealth Patient Portal offered by [facility] by registering at the following website: [email or web address]. By joining [employer]’s FollowMyHealth portal, you will also be able to view your health information using other applications (apps) compatible with our system.

## 2024-10-23 NOTE — PROGRESS NOTE ADULT - SUBJECTIVE AND OBJECTIVE BOX
**INCOMPLETE NOTE    INTERVAL/OVERNIGHT EVENTS: None    SUBJECTIVE:  Patient seen and examined at bedside, comfortable, NAD. Denied fever, chest pain, dyspnea, abdominal pain.     Vital Signs Last 12 Hrs  T(F): 97.4 (10-23-24 @ 05:17), Max: 97.4 (10-23-24 @ 05:17)  HR: 72 (10-23-24 @ 05:17) (72 - 72)  BP: 129/72 (10-23-24 @ 05:17) (129/72 - 129/72)  BP(mean): --  RR: 18 (10-23-24 @ 05:17) (18 - 18)  SpO2: 96% (10-23-24 @ 05:17) (96% - 96%)  I&O's Summary    21 Oct 2024 07:01  -  22 Oct 2024 07:00  --------------------------------------------------------  IN: 660 mL / OUT: 830 mL / NET: -170 mL        PHYSICAL EXAM:  General: NAD  HEENT: PERRL, EOM intact, sclera anicteric, MMM  Cardiovascular: RRR; no MRG; no JVD  Respiratory: CTAB; no WRR  GI/: soft; NTND; BS x4  Extremities: WWP; 2+ peripheral pulses bilaterally; no LE edema  Skin: normal color & turgor; no rash  Neurologic: aox3; no focal deficits    LABS:                        12.6   5.45  )-----------( 249      ( 22 Oct 2024 05:30 )             40.6     10-22    145  |  108  |  8   ----------------------------<  94  3.6   |  26  |  0.69    Ca    8.7      22 Oct 2024 05:30  Phos  2.7     10-22  Mg     1.9     10-22    TPro  6.3  /  Alb  3.2[L]  /  TBili  0.3  /  DBili  x   /  AST  20  /  ALT  22  /  AlkPhos  48  10-22      Urinalysis Basic - ( 22 Oct 2024 05:30 )    Color: x / Appearance: x / SG: x / pH: x  Gluc: 94 mg/dL / Ketone: x  / Bili: x / Urobili: x   Blood: x / Protein: x / Nitrite: x   Leuk Esterase: x / RBC: x / WBC x   Sq Epi: x / Non Sq Epi: x / Bacteria: x          RADIOLOGY & ADDITIONAL TESTS:    MEDICATIONS  (STANDING):  apixaban 10 milliGRAM(s) Oral every 12 hours  aspirin enteric coated 81 milliGRAM(s) Oral daily  dextrose 5%. 1000 milliLiter(s) (50 mL/Hr) IV Continuous <Continuous>  dextrose 5%. 1000 milliLiter(s) (100 mL/Hr) IV Continuous <Continuous>  dextrose 50% Injectable 25 Gram(s) IV Push once  dextrose 50% Injectable 12.5 Gram(s) IV Push once  dextrose 50% Injectable 25 Gram(s) IV Push once  finasteride 5 milliGRAM(s) Oral daily  gabapentin 300 milliGRAM(s) Oral every 8 hours  glucagon  Injectable 1 milliGRAM(s) IntraMuscular once  insulin lispro (ADMELOG) corrective regimen sliding scale   SubCutaneous every 6 hours  tamsulosin 0.4 milliGRAM(s) Oral at bedtime    MEDICATIONS  (PRN):  acetaminophen     Tablet .. 650 milliGRAM(s) Oral every 6 hours PRN Temp greater or equal to 38C (100.4F), Mild Pain (1 - 3)  albuterol    90 MICROgram(s) HFA Inhaler 2 Puff(s) Inhalation every 6 hours PRN Shortness of Breath and/or Wheezing  dextrose Oral Gel 15 Gram(s) Oral once PRN Blood Glucose LESS THAN 70 milliGRAM(s)/deciliter   **INCOMPLETE NOTE    INTERVAL/OVERNIGHT EVENTS: No acute events. Patient 1x episode of diarrhea over night and no nausea and vomittig with eating.     SUBJECTIVE:  Patient seen and examined at bedside, comfortable, NAD. Denied fever, chest pain, dyspnea, abdominal pain. Patient states his symptoms have gotten better    Vital Signs Last 12 Hrs  T(F): 97.4 (10-23-24 @ 05:17), Max: 97.4 (10-23-24 @ 05:17)  HR: 72 (10-23-24 @ 05:17) (72 - 72)  BP: 129/72 (10-23-24 @ 05:17) (129/72 - 129/72)  BP(mean): --  RR: 18 (10-23-24 @ 05:17) (18 - 18)  SpO2: 96% (10-23-24 @ 05:17) (96% - 96%)  I&O's Summary    21 Oct 2024 07:01  -  22 Oct 2024 07:00  --------------------------------------------------------  IN: 660 mL / OUT: 830 mL / NET: -170 mL        PHYSICAL EXAM:  General: NAD  HEENT: PERRL, EOM intact, sclera anicteric, MMM  Cardiovascular: RRR; no MRG; no JVD  Respiratory: CTAB; no WRR  GI/: soft; NTND; BS x4  Extremities: WWP; 2+ peripheral pulses bilaterally; no LE edema  Skin: normal color & turgor; no rash  Neurologic: aox3; no focal deficits    LABS:                        12.6   5.45  )-----------( 249      ( 22 Oct 2024 05:30 )             40.6     10-22    145  |  108  |  8   ----------------------------<  94  3.6   |  26  |  0.69    Ca    8.7      22 Oct 2024 05:30  Phos  2.7     10-22  Mg     1.9     10-22    TPro  6.3  /  Alb  3.2[L]  /  TBili  0.3  /  DBili  x   /  AST  20  /  ALT  22  /  AlkPhos  48  10-22      Urinalysis Basic - ( 22 Oct 2024 05:30 )    Color: x / Appearance: x / SG: x / pH: x  Gluc: 94 mg/dL / Ketone: x  / Bili: x / Urobili: x   Blood: x / Protein: x / Nitrite: x   Leuk Esterase: x / RBC: x / WBC x   Sq Epi: x / Non Sq Epi: x / Bacteria: x          RADIOLOGY & ADDITIONAL TESTS:    MEDICATIONS  (STANDING):  apixaban 10 milliGRAM(s) Oral every 12 hours  aspirin enteric coated 81 milliGRAM(s) Oral daily  dextrose 5%. 1000 milliLiter(s) (50 mL/Hr) IV Continuous <Continuous>  dextrose 5%. 1000 milliLiter(s) (100 mL/Hr) IV Continuous <Continuous>  dextrose 50% Injectable 25 Gram(s) IV Push once  dextrose 50% Injectable 12.5 Gram(s) IV Push once  dextrose 50% Injectable 25 Gram(s) IV Push once  finasteride 5 milliGRAM(s) Oral daily  gabapentin 300 milliGRAM(s) Oral every 8 hours  glucagon  Injectable 1 milliGRAM(s) IntraMuscular once  insulin lispro (ADMELOG) corrective regimen sliding scale   SubCutaneous every 6 hours  tamsulosin 0.4 milliGRAM(s) Oral at bedtime    MEDICATIONS  (PRN):  acetaminophen     Tablet .. 650 milliGRAM(s) Oral every 6 hours PRN Temp greater or equal to 38C (100.4F), Mild Pain (1 - 3)  albuterol    90 MICROgram(s) HFA Inhaler 2 Puff(s) Inhalation every 6 hours PRN Shortness of Breath and/or Wheezing  dextrose Oral Gel 15 Gram(s) Oral once PRN Blood Glucose LESS THAN 70 milliGRAM(s)/deciliter   INTERVAL/OVERNIGHT EVENTS: No acute events. Patient 1x episode of diarrhea over night and no nausea and vomittig with eating.     SUBJECTIVE:  Patient seen and examined at bedside, comfortable, NAD. Denied fever, chest pain, dyspnea, abdominal pain. Patient states his symptoms have gotten better    Vital Signs Last 12 Hrs  T(F): 97.4 (10-23-24 @ 05:17), Max: 97.4 (10-23-24 @ 05:17)  HR: 72 (10-23-24 @ 05:17) (72 - 72)  BP: 129/72 (10-23-24 @ 05:17) (129/72 - 129/72)  BP(mean): --  RR: 18 (10-23-24 @ 05:17) (18 - 18)  SpO2: 96% (10-23-24 @ 05:17) (96% - 96%)  I&O's Summary    21 Oct 2024 07:01  -  22 Oct 2024 07:00  --------------------------------------------------------  IN: 660 mL / OUT: 830 mL / NET: -170 mL        PHYSICAL EXAM:  General: NAD, obese  HEENT: PERRL, EOM intact, sclera anicteric, MMM  Cardiovascular: RRR; no MRG; no JVD  Respiratory: CTAB; no WRR  GI/: soft; NTND; BS x4  Extremities: WWP; 2+ peripheral pulses bilaterally; +1 trace pitting edema bl   Skin: normal color & turgor; no rash  Neurologic: aox3; no focal deficits    LABS:                        12.6   5.45  )-----------( 249      ( 22 Oct 2024 05:30 )             40.6     10-22    145  |  108  |  8   ----------------------------<  94  3.6   |  26  |  0.69    Ca    8.7      22 Oct 2024 05:30  Phos  2.7     10-22  Mg     1.9     10-22    TPro  6.3  /  Alb  3.2[L]  /  TBili  0.3  /  DBili  x   /  AST  20  /  ALT  22  /  AlkPhos  48  10-22      Urinalysis Basic - ( 22 Oct 2024 05:30 )    Color: x / Appearance: x / SG: x / pH: x  Gluc: 94 mg/dL / Ketone: x  / Bili: x / Urobili: x   Blood: x / Protein: x / Nitrite: x   Leuk Esterase: x / RBC: x / WBC x   Sq Epi: x / Non Sq Epi: x / Bacteria: x          RADIOLOGY & ADDITIONAL TESTS:    MEDICATIONS  (STANDING):  apixaban 10 milliGRAM(s) Oral every 12 hours  aspirin enteric coated 81 milliGRAM(s) Oral daily  dextrose 5%. 1000 milliLiter(s) (50 mL/Hr) IV Continuous <Continuous>  dextrose 5%. 1000 milliLiter(s) (100 mL/Hr) IV Continuous <Continuous>  dextrose 50% Injectable 25 Gram(s) IV Push once  dextrose 50% Injectable 12.5 Gram(s) IV Push once  dextrose 50% Injectable 25 Gram(s) IV Push once  finasteride 5 milliGRAM(s) Oral daily  gabapentin 300 milliGRAM(s) Oral every 8 hours  glucagon  Injectable 1 milliGRAM(s) IntraMuscular once  insulin lispro (ADMELOG) corrective regimen sliding scale   SubCutaneous every 6 hours  tamsulosin 0.4 milliGRAM(s) Oral at bedtime    MEDICATIONS  (PRN):  acetaminophen     Tablet .. 650 milliGRAM(s) Oral every 6 hours PRN Temp greater or equal to 38C (100.4F), Mild Pain (1 - 3)  albuterol    90 MICROgram(s) HFA Inhaler 2 Puff(s) Inhalation every 6 hours PRN Shortness of Breath and/or Wheezing  dextrose Oral Gel 15 Gram(s) Oral once PRN Blood Glucose LESS THAN 70 milliGRAM(s)/deciliter

## 2024-10-23 NOTE — PROGRESS NOTE ADULT - ASSESSMENT
80yo Male pt with PMH of CAD, HTN, T2DM, BPH, and peripheral neuropathy and no PSHx who initially presented to [facility] ED on 10/10 with bilateral lower extremity weakness and was ultimately admitted, diagnosed, and discharged to [facility] with lumbar spinal stenosis with new supplemental O2. During that admission, patient had abdominal distension and diarrhea however GI PCR and CDiff were negative at that time with KUB findings of Mild gaseous distention of colonic loops measuring up to 6.7 cm. Patient represented to [facility] ED 3d after discharge from [facility] with new bilateral lower extremity swelling. ED workup included Bilateral Lower Extremity Venous duplex with findings of acute DVT of right peroneal vein and CTA PE with findings of Large right main pulmonary artery embolism with associated right heart strain. Patient was started on eliquis, admitted to telemetry, and stepped down to regional today. Patient continues to endorse nonbloody diarrhea x3 daily with bloating and mild diffuse abdominal pain. AXR was obtained with findings of Diffuse colonic distention up to 13cm in right abdomen, increased since October 14. General surgery was consulted for colonic distension in the setting of persistent diarrhea. Low suspicion for toxic megacolon as patient is well appearing with normal vitals, laboratory findings, and negative infectious workup. CT findings of large amount of colonic gas with mild colonic distention and no discrete transition point, findings consistent with possible pseudoobstruction.    Recommendations:  No acute surgical intervention at this time  Unclear if cathartic bowel function  Would recommend flexible sig to rule out mechanical obstuction  Team 4C will continue to follow  Plan discussed with chief resident and attending

## 2024-10-23 NOTE — PROVIDER CONTACT NOTE (OTHER) - ACTION/TREATMENT ORDERED:
pt still for discharge to rehab, MD stated she would put in notes for rehab to continue to monitor bladder scans closely

## 2024-10-24 ENCOUNTER — NON-APPOINTMENT (OUTPATIENT)
Age: 79
End: 2024-10-24

## 2024-10-24 DIAGNOSIS — R26.9 UNSPECIFIED ABNORMALITIES OF GAIT AND MOBILITY: ICD-10-CM

## 2024-10-24 DIAGNOSIS — K74.60 UNSPECIFIED CIRRHOSIS OF LIVER: ICD-10-CM

## 2024-10-24 DIAGNOSIS — I10 ESSENTIAL (PRIMARY) HYPERTENSION: ICD-10-CM

## 2024-10-24 DIAGNOSIS — N39.0 URINARY TRACT INFECTION, SITE NOT SPECIFIED: ICD-10-CM

## 2024-10-24 DIAGNOSIS — Z79.85 LONG-TERM (CURRENT) USE OF INJECTABLE NON-INSULIN ANTIDIABETIC DRUGS: ICD-10-CM

## 2024-10-24 DIAGNOSIS — R29.898 OTHER SYMPTOMS AND SIGNS INVOLVING THE MUSCULOSKELETAL SYSTEM: ICD-10-CM

## 2024-10-24 DIAGNOSIS — M51.27 OTHER INTERVERTEBRAL DISC DISPLACEMENT, LUMBOSACRAL REGION: ICD-10-CM

## 2024-10-24 DIAGNOSIS — I25.10 ATHEROSCLEROTIC HEART DISEASE OF NATIVE CORONARY ARTERY WITHOUT ANGINA PECTORIS: ICD-10-CM

## 2024-10-24 DIAGNOSIS — R19.7 DIARRHEA, UNSPECIFIED: ICD-10-CM

## 2024-10-24 DIAGNOSIS — Z79.84 LONG TERM (CURRENT) USE OF ORAL HYPOGLYCEMIC DRUGS: ICD-10-CM

## 2024-10-24 DIAGNOSIS — R42 DIZZINESS AND GIDDINESS: ICD-10-CM

## 2024-10-24 DIAGNOSIS — M47.817 SPONDYLOSIS WITHOUT MYELOPATHY OR RADICULOPATHY, LUMBOSACRAL REGION: ICD-10-CM

## 2024-10-24 DIAGNOSIS — E66.01 MORBID (SEVERE) OBESITY DUE TO EXCESS CALORIES: ICD-10-CM

## 2024-10-24 DIAGNOSIS — E11.42 TYPE 2 DIABETES MELLITUS WITH DIABETIC POLYNEUROPATHY: ICD-10-CM

## 2024-10-24 DIAGNOSIS — M48.061 SPINAL STENOSIS, LUMBAR REGION WITHOUT NEUROGENIC CLAUDICATION: ICD-10-CM

## 2024-10-24 DIAGNOSIS — Z91.81 HISTORY OF FALLING: ICD-10-CM

## 2024-10-24 DIAGNOSIS — N40.1 BENIGN PROSTATIC HYPERPLASIA WITH LOWER URINARY TRACT SYMPTOMS: ICD-10-CM

## 2024-10-24 DIAGNOSIS — K56.7 ILEUS, UNSPECIFIED: ICD-10-CM

## 2024-10-29 DIAGNOSIS — E87.6 HYPOKALEMIA: ICD-10-CM

## 2024-10-29 DIAGNOSIS — Z87.891 PERSONAL HISTORY OF NICOTINE DEPENDENCE: ICD-10-CM

## 2024-10-29 DIAGNOSIS — Z79.84 LONG TERM (CURRENT) USE OF ORAL HYPOGLYCEMIC DRUGS: ICD-10-CM

## 2024-10-29 DIAGNOSIS — M48.061 SPINAL STENOSIS, LUMBAR REGION WITHOUT NEUROGENIC CLAUDICATION: ICD-10-CM

## 2024-10-29 DIAGNOSIS — E11.40 TYPE 2 DIABETES MELLITUS WITH DIABETIC NEUROPATHY, UNSPECIFIED: ICD-10-CM

## 2024-10-29 DIAGNOSIS — Z79.85 LONG-TERM (CURRENT) USE OF INJECTABLE NON-INSULIN ANTIDIABETIC DRUGS: ICD-10-CM

## 2024-10-29 DIAGNOSIS — K56.7 ILEUS, UNSPECIFIED: ICD-10-CM

## 2024-10-29 DIAGNOSIS — I25.10 ATHEROSCLEROTIC HEART DISEASE OF NATIVE CORONARY ARTERY WITHOUT ANGINA PECTORIS: ICD-10-CM

## 2024-10-29 DIAGNOSIS — M79.89 OTHER SPECIFIED SOFT TISSUE DISORDERS: ICD-10-CM

## 2024-10-29 DIAGNOSIS — I10 ESSENTIAL (PRIMARY) HYPERTENSION: ICD-10-CM

## 2024-10-29 DIAGNOSIS — R09.02 HYPOXEMIA: ICD-10-CM

## 2024-10-29 DIAGNOSIS — I26.99 OTHER PULMONARY EMBOLISM WITHOUT ACUTE COR PULMONALE: ICD-10-CM

## 2024-10-29 DIAGNOSIS — E83.42 HYPOMAGNESEMIA: ICD-10-CM

## 2024-10-29 DIAGNOSIS — N40.0 BENIGN PROSTATIC HYPERPLASIA WITHOUT LOWER URINARY TRACT SYMPTOMS: ICD-10-CM

## 2024-10-29 DIAGNOSIS — R33.8 OTHER RETENTION OF URINE: ICD-10-CM

## 2024-10-29 DIAGNOSIS — N39.0 URINARY TRACT INFECTION, SITE NOT SPECIFIED: ICD-10-CM

## 2024-10-29 DIAGNOSIS — Z79.82 LONG TERM (CURRENT) USE OF ASPIRIN: ICD-10-CM

## 2024-10-29 DIAGNOSIS — R15.9 FULL INCONTINENCE OF FECES: ICD-10-CM

## 2024-10-29 DIAGNOSIS — I82.451 ACUTE EMBOLISM AND THROMBOSIS OF RIGHT PERONEAL VEIN: ICD-10-CM

## 2024-10-29 DIAGNOSIS — K59.81 OGILVIE SYNDROME: ICD-10-CM

## 2024-10-30 ENCOUNTER — NON-APPOINTMENT (OUTPATIENT)
Age: 79
End: 2024-10-30

## 2024-11-26 PROCEDURE — 83605 ASSAY OF LACTIC ACID: CPT

## 2024-11-26 PROCEDURE — 71275 CT ANGIOGRAPHY CHEST: CPT | Mod: MC

## 2024-11-26 PROCEDURE — 93970 EXTREMITY STUDY: CPT

## 2024-11-26 PROCEDURE — 85025 COMPLETE CBC W/AUTO DIFF WBC: CPT

## 2024-11-26 PROCEDURE — 99285 EMERGENCY DEPT VISIT HI MDM: CPT

## 2024-11-26 PROCEDURE — 84484 ASSAY OF TROPONIN QUANT: CPT

## 2024-11-26 PROCEDURE — 85610 PROTHROMBIN TIME: CPT

## 2024-11-26 PROCEDURE — 85027 COMPLETE CBC AUTOMATED: CPT

## 2024-11-26 PROCEDURE — 80048 BASIC METABOLIC PNL TOTAL CA: CPT

## 2024-11-26 PROCEDURE — 74018 RADEX ABDOMEN 1 VIEW: CPT

## 2024-11-26 PROCEDURE — 97161 PT EVAL LOW COMPLEX 20 MIN: CPT

## 2024-11-26 PROCEDURE — 86850 RBC ANTIBODY SCREEN: CPT

## 2024-11-26 PROCEDURE — 83036 HEMOGLOBIN GLYCOSYLATED A1C: CPT

## 2024-11-26 PROCEDURE — 85730 THROMBOPLASTIN TIME PARTIAL: CPT

## 2024-11-26 PROCEDURE — 87507 IADNA-DNA/RNA PROBE TQ 12-25: CPT

## 2024-11-26 PROCEDURE — 93306 TTE W/DOPPLER COMPLETE: CPT

## 2024-11-26 PROCEDURE — 74177 CT ABD & PELVIS W/CONTRAST: CPT | Mod: MC

## 2024-11-26 PROCEDURE — 96375 TX/PRO/DX INJ NEW DRUG ADDON: CPT

## 2024-11-26 PROCEDURE — 87449 NOS EACH ORGANISM AG IA: CPT

## 2024-11-26 PROCEDURE — 87086 URINE CULTURE/COLONY COUNT: CPT

## 2024-11-26 PROCEDURE — 82962 GLUCOSE BLOOD TEST: CPT

## 2024-11-26 PROCEDURE — 83735 ASSAY OF MAGNESIUM: CPT

## 2024-11-26 PROCEDURE — 84100 ASSAY OF PHOSPHORUS: CPT

## 2024-11-26 PROCEDURE — 80053 COMPREHEN METABOLIC PANEL: CPT

## 2024-11-26 PROCEDURE — 36415 COLL VENOUS BLD VENIPUNCTURE: CPT

## 2024-11-26 PROCEDURE — 96374 THER/PROPH/DIAG INJ IV PUSH: CPT

## 2024-11-26 PROCEDURE — 86900 BLOOD TYPING SEROLOGIC ABO: CPT

## 2024-11-26 PROCEDURE — 87324 CLOSTRIDIUM AG IA: CPT

## 2024-11-26 PROCEDURE — 86901 BLOOD TYPING SEROLOGIC RH(D): CPT

## 2024-11-26 PROCEDURE — 74019 RADEX ABDOMEN 2 VIEWS: CPT

## 2024-11-26 PROCEDURE — 83880 ASSAY OF NATRIURETIC PEPTIDE: CPT

## 2024-11-26 PROCEDURE — 81001 URINALYSIS AUTO W/SCOPE: CPT

## 2024-12-18 ENCOUNTER — TRANSCRIPTION ENCOUNTER (OUTPATIENT)
Age: 79
End: 2024-12-18

## 2024-12-18 ENCOUNTER — NON-APPOINTMENT (OUTPATIENT)
Age: 79
End: 2024-12-18

## 2024-12-18 ENCOUNTER — APPOINTMENT (OUTPATIENT)
Dept: ORTHOPEDIC SURGERY | Facility: CLINIC | Age: 79
End: 2024-12-18

## 2024-12-18 DIAGNOSIS — R20.0 ANESTHESIA OF SKIN: ICD-10-CM

## 2024-12-18 DIAGNOSIS — R53.1 WEAKNESS: ICD-10-CM

## 2024-12-18 DIAGNOSIS — Z86.39 PERSONAL HISTORY OF OTHER ENDOCRINE, NUTRITIONAL AND METABOLIC DISEASE: ICD-10-CM

## 2024-12-18 DIAGNOSIS — M48.061 SPINAL STENOSIS, LUMBAR REGION WITHOUT NEUROGENIC CLAUDICATION: ICD-10-CM

## 2024-12-18 DIAGNOSIS — Z86.79 PERSONAL HISTORY OF OTHER DISEASES OF THE CIRCULATORY SYSTEM: ICD-10-CM

## 2024-12-18 PROCEDURE — 99204 OFFICE O/P NEW MOD 45 MIN: CPT

## 2024-12-23 ENCOUNTER — APPOINTMENT (OUTPATIENT)
Dept: NEUROLOGY | Age: 79
End: 2024-12-23
Payer: MEDICARE

## 2024-12-23 ENCOUNTER — NON-APPOINTMENT (OUTPATIENT)
Age: 79
End: 2024-12-23

## 2024-12-23 VITALS
WEIGHT: 218 LBS | DIASTOLIC BLOOD PRESSURE: 68 MMHG | OXYGEN SATURATION: 96 % | SYSTOLIC BLOOD PRESSURE: 122 MMHG | RESPIRATION RATE: 17 BRPM | TEMPERATURE: 97.6 F | HEART RATE: 92 BPM | BODY MASS INDEX: 34.21 KG/M2 | HEIGHT: 67 IN

## 2024-12-23 DIAGNOSIS — G20.C PARKINSONISM, UNSPECIFIED: ICD-10-CM

## 2024-12-23 PROCEDURE — 99205 OFFICE O/P NEW HI 60 MIN: CPT

## 2024-12-23 RX ORDER — METFORMIN HYDROCHLORIDE 500 MG/1
500 TABLET, COATED ORAL DAILY
Refills: 0 | Status: ACTIVE | COMMUNITY

## 2024-12-23 RX ORDER — FUROSEMIDE 40 MG/1
40 TABLET ORAL DAILY
Refills: 0 | Status: ACTIVE | COMMUNITY

## 2024-12-23 RX ORDER — GABAPENTIN 300 MG
300 TABLET ORAL DAILY
Refills: 0 | Status: ACTIVE | COMMUNITY

## 2024-12-23 RX ORDER — CARBIDOPA AND LEVODOPA 25; 100 MG/1; MG/1
25-100 TABLET ORAL
Qty: 90 | Refills: 3 | Status: ACTIVE | COMMUNITY
Start: 2024-12-23 | End: 1900-01-01

## 2024-12-23 RX ORDER — APIXABAN 5 MG/1
5 TABLET, FILM COATED ORAL DAILY
Refills: 0 | Status: ACTIVE | COMMUNITY

## 2024-12-23 RX ORDER — FINASTERIDE 5 MG/1
5 TABLET, FILM COATED ORAL DAILY
Refills: 0 | Status: ACTIVE | COMMUNITY

## 2024-12-23 RX ORDER — APREMILAST 30 MG/1
30 TABLET, FILM COATED ORAL DAILY
Refills: 0 | Status: ACTIVE | COMMUNITY

## 2024-12-26 ENCOUNTER — LABORATORY RESULT (OUTPATIENT)
Age: 79
End: 2024-12-26

## 2025-01-02 ENCOUNTER — TRANSCRIPTION ENCOUNTER (OUTPATIENT)
Age: 80
End: 2025-01-02

## 2025-01-02 LAB
A-TOCOPHEROL VIT E SERPL-MCNC: 15.7 MG/L
BETA+GAMMA TOCOPHEROL SERPL-MCNC: 0.7 MG/L
COPPER SERPL-MCNC: 105 UG/DL
CRP SERPL-MCNC: 7 MG/L
FOLATE SERPL-MCNC: 3.8 NG/ML
HOMOCYSTEINE LEVEL: 19.7 UMOL/L
M PROTEIN SPEC IFE-MCNC: NORMAL
METHYLMALONATE SERPL-SCNC: 124 NMOL/L
T4 FREE SERPL-MCNC: 1.5 NG/DL
TSH SERPL-ACNC: 1.12 UIU/ML
VIT B12 SERPL-MCNC: 628 PG/ML
ZINC SERPL-MCNC: 62 UG/DL

## 2025-01-07 ENCOUNTER — EMERGENCY (EMERGENCY)
Facility: HOSPITAL | Age: 80
LOS: 1 days | Discharge: HOME | End: 2025-01-07
Attending: EMERGENCY MEDICINE | Admitting: EMERGENCY MEDICINE
Payer: MEDICARE

## 2025-01-07 VITALS
HEART RATE: 90 BPM | RESPIRATION RATE: 18 BRPM | OXYGEN SATURATION: 98 % | SYSTOLIC BLOOD PRESSURE: 109 MMHG | TEMPERATURE: 97 F | DIASTOLIC BLOOD PRESSURE: 67 MMHG

## 2025-01-07 VITALS
RESPIRATION RATE: 18 BRPM | TEMPERATURE: 97 F | HEART RATE: 90 BPM | HEIGHT: 67 IN | OXYGEN SATURATION: 98 % | SYSTOLIC BLOOD PRESSURE: 103 MMHG | WEIGHT: 197.98 LBS | DIASTOLIC BLOOD PRESSURE: 64 MMHG

## 2025-01-07 PROCEDURE — 73080 X-RAY EXAM OF ELBOW: CPT

## 2025-01-07 PROCEDURE — 73080 X-RAY EXAM OF ELBOW: CPT | Mod: 26,RT

## 2025-01-07 PROCEDURE — 99284 EMERGENCY DEPT VISIT MOD MDM: CPT | Mod: 25

## 2025-01-07 PROCEDURE — 73030 X-RAY EXAM OF SHOULDER: CPT | Mod: 26,RT

## 2025-01-07 PROCEDURE — 73030 X-RAY EXAM OF SHOULDER: CPT

## 2025-01-07 PROCEDURE — 73060 X-RAY EXAM OF HUMERUS: CPT | Mod: 26,RT

## 2025-01-07 PROCEDURE — 73090 X-RAY EXAM OF FOREARM: CPT | Mod: 26,RT

## 2025-01-07 PROCEDURE — 73060 X-RAY EXAM OF HUMERUS: CPT

## 2025-01-07 PROCEDURE — 99284 EMERGENCY DEPT VISIT MOD MDM: CPT

## 2025-01-07 PROCEDURE — 73090 X-RAY EXAM OF FOREARM: CPT

## 2025-01-07 NOTE — ED PROVIDER NOTE - NSFOLLOWUPINSTRUCTIONS_ED_ALL_ED_FT
Can take tylenol 650mg every 6hrs as needed for pain.    Follow up with primary doctor within 1-2 days.    Follow up with orthopedist for persistent pain. Can call [phone] to schedule appointment.     Return for fevers, persistent vomit, uncontrolled pain, focal weakness/numbness, worsening swelling, spreading redness OR if you feel you are unable to continue caring for yourself at home.

## 2025-01-07 NOTE — ED PROVIDER NOTE - PHYSICAL EXAMINATION
Mild ttp to R anteiror shoulder/humerus region. Minimally decreased ROM of shoulder 2/2 pain.   No spinal ttp, neck FROM. Strength b/l UE 5/5, b/l LE 4/5,. No other bony ttp, FROM all other extremities. Normal equal distal pulses.  No snuffbox ttp.   1+ b/l LE pitting edema  no chest/rib ttp

## 2025-01-07 NOTE — ED PROVIDER NOTE - OBJECTIVE STATEMENT
79M PMH HTN, CAD, DM2, BPH, peripheral neuropathy, lumbar spinal, PE on eliquis, ileus/colonic distention p/w fall. Pt was feeling like usual self. States he uses walker at baseline but was walking w/o one at home and made a quick turn and lost his balance and fell, landed on R shoulder. Has pain to RUE since then (mostly shoulder, but also forearm/elbow/humerus region). Occurred ~2100. Minimal R lateral hip pain. Took tylenol. Wife assisted him up, was able to ambulate afterwards. On ROS wife notes chronic unchanged b/l LE edema. No other systemic symptoms.   Denies any other preceding symptoms prior to fall. Denies hitting head, LOC, vision changes, focal weakness, focal numbness, neck pain, back pain, SOB, CP, HA, abd pain, nausea, vomiting, diarrhea, black stool, bloody stool, urinary complaints, URI symptoms. Denies other MSK pain. Denies recent illnesses or medication changes.

## 2025-01-07 NOTE — ED PROVIDER NOTE - PATIENT PORTAL LINK FT
You can access the FollowMyHealth Patient Portal offered by [facility] by registering at the following website: [email or web address]. By joining [employer]’s FollowMyHealth portal, you will also be able to view your health information using other applications (apps) compatible with our system.

## 2025-01-07 NOTE — ED ADULT NURSE NOTE - OBJECTIVE STATEMENT
tripped and fall, at 9:30 PM, injury to right arm/shoulder Pt complaints of tripped and fall around 9:30 PM, Report injury to right arm/shoulder and minimal Rt lateral hip pain. Report took Tylenol and able to ambulate afterwards. Denies head strike, LOC, vision changes, focal weakness, focal numbness, neck pain, back pain, SOB, CP, HA or any other associated discomfort at this time.  Pt is alert and oriented, A&O4, family member is on bedside.

## 2025-01-07 NOTE — ED ADULT NURSE NOTE - IS THE PATIENT ABLE TO BE SCREENED?
PT Daily Note-Current


Subjective


Pt reclined in chair upon arrival.  Pt agrees to PT.





Pain





   Location:  No Pain Reported





Mental Status


Patient Orientation:  Person, [place]


Attachments:  [place] Catheter





Transfers


SCALE: Activities may be completed with or without assistive devices.





6-Indepedent-patient completes the activity by him/herself with no assistance 

from a helper.


5-Set-up or Clean-up Assistance-helper sets up or cleans up; patient completes 

activity. [place] assists only prior to or  


    following the activity.


4-Supervision or Touching Assistance-helper provides verbal cues and/or touc

candy/steadying and/or contact guard assistance as patient completes activity. 

Assistance may be provided   


    throughout the activity or intermittently.


3-Partial/Moderate Assistance-helper does LESS THAN HALF the effort. [place] 

lifts, holds or supports trunk or limbs, but provides less than half the effort.


2-Substantial/Maximal Assistance-helper does MORE THAN HALF the effort. [place] 

lifts or holds trunk or limbs and provides more than half the effort.


7-[id number] does ALL the effort. Patient does none of the effort to 

complete the activity. Or, the assistance of 2 or more helpers is required for 

the patient to complete the  


    activity.


If activity was not attempted, code reason:


7-Patient Refused.


9-Not Applicable-not attempted and the patient did not perform the activity 

before the current illness, exacerbation or injury.


10-Not Attempted due to Environmental Limitations-(lack of equipment, weather 

restraints, etc.).


88-Not Attempted due to Medical Conditions or Safety Concerns.


Sit to Lying (QC):  4


Sit to Stand (QC):  4





Weight Bearing


Right Lower Extremity:  Right


Full Weight Bearing


Left Lower Extremity:  Left


Full Weight Bearing





Treatments


PTA assists in TF from recliner to EOB then Supine.  Pt is assisted in 

repositioning then resting in bed.  Pt has all needs met, call light in hand.





Assessment


Current Status:  Good Progress


Pt is able to follow VC well but fatigues & SOA after TF.





PT Short Term Goals


Short Term Goals


Time Frame:  2020


Roll Left & Right:  4


Sit to lyin


Lying to sitting on side of be:  3


Chair/bed-to-chair transfer:  3


Walk 10 feet:  3


Does pt use a wc or scooter:  Yes


Wheel 50ft w/2 turns:  3


Wheel 150 feet:  3





PT Long Term Goals


Long Term Goals


PT Long Term Goals Time Frame:  Dec 3, 2020


Roll Left & Right (QC):  6


Sit to Lying (QC):  6


Lying-Sitting on Side/Bed(QC):  6


Sit to Stand (QC):  4


Chair/Bed-to-Chair Xfer(QC):  4


Toilet Transfer (QC):  4


Car Transfer (QC):  4


Does the Patient Walk:  Yes


Walk 10 feet (QC):  3


Walk 50ft with 2 Turns (QC):  3


Walk 150 ft (QC):  88


Walking 10ft on Uneven Surface:  3


1 Step (curb) (QC):  3


4 Steps (QC):  88


12 Steps (QC):  88


Picking up an Object (QC):  88


Does the Pt use WC or Scooter?:  Yes


Wheel 50 feet with 2 turns (QC:  6


Wheel 150 feet:  6





PT Plan


Problem List


Problem List:  Activity Tolerance, Functional Strength, Transfer





Treatment/Plan


Treatment Plan:  Continue Plan of Care


Treatment Plan:  Bed Mobility, Education, Functional Activity [place], Functional 

Strength, Group Therapy, Gait, Safety, Therapeutic Exercise, Transfers


Treatment Duration:  2020


Frequency:  At least 5 of 7 days/Wk (IRF)


Estimated Hrs Per Day:  1.5 hours per day


Patient and/or Family Agrees t:  Yes





Safety Risks/Education


Patient Education:  Transfer Techniques, Correct Positioning, Safety Issues


Teaching Recipient:  Patient


Teaching Methods:  Discussion


Response to Teaching:  Verbalize Understanding





Time/GCodes


Time In:  1300


Time Out:  1315


Total Billed Treatment Time:  15


Total Billed Treatment


1, FA (15m)











PATEL WELLS PTA              2020 13:42 Yes

## 2025-01-07 NOTE — ED PROVIDER NOTE - PROGRESS NOTE DETAILS
[place]: XR w/ no acute pathology. Remains well appearing. Feels comfortable managing at home. Wife at bedside. Discussed importance of outpt follow up and return precautions. Clinically no indication for further emergent ED workup or hospitalization at this time. Stable for dc, outpt f/u.

## 2025-01-07 NOTE — ED ADULT NURSE NOTE - NSFALLHARMRISKINTERV_ED_ALL_ED

## 2025-01-07 NOTE — ED PROVIDER NOTE - CLINICAL SUMMARY MEDICAL DECISION MAKING FREE TEXT BOX
79M PMH HTN, CAD, DM2, BPH, peripheral neuropathy, lumbar spinal, PE on eliquis, ileus/colonic distention p/w fall. Pt was feeling like usual self. States he uses walker at baseline but was walking w/o one at home and made a quick turn and lost his balance and fell, landed on R shoulder. Has pain to RUE since then (mostly shoulder, but also forearm/elbow/humerus region). Occurred ~2100. Minimal R lateral hip pain. Took tylenol. Wife assisted him up, was able to ambulate afterwards. On ROS wife notes chronic unchanged b/l LE edema. No other systemic symptoms.   Vitals wnl, exam as above.   ddx: Clinically no new pathology that caused the fall. Possible subsequent fx.   XR, reassess.   Avoiding NSAIDs 2/2 eliquis use. Avoiding narcotics 2/2 relatively recent ileus/colonic distention.

## 2025-01-09 ENCOUNTER — APPOINTMENT (OUTPATIENT)
Dept: MRI IMAGING | Facility: HOSPITAL | Age: 80
End: 2025-01-09

## 2025-01-09 ENCOUNTER — OUTPATIENT (OUTPATIENT)
Dept: OUTPATIENT SERVICES | Facility: HOSPITAL | Age: 80
LOS: 1 days | End: 2025-01-09
Payer: MEDICARE

## 2025-01-09 PROCEDURE — 72141 MRI NECK SPINE W/O DYE: CPT

## 2025-01-09 PROCEDURE — 72141 MRI NECK SPINE W/O DYE: CPT | Mod: 26

## 2025-01-09 NOTE — ED PROVIDER NOTE - PRINCIPAL DIAGNOSIS
Orders:  Labs: Ordered for lab draw 1/14    Imaging: Liver ultrasound due in April 2025.   Please call central scheduling to schedule at [phone]    Recommendations:  Monitor weight daily   Diet recommendations   High protein diet (more than 80-90 g protein per day)  Examples of protein: chicken, turkey, pork, fish, eggs, nuts, peanut butter, dairy products, beans/legumes   Add protein supplements every day with 20-30 g protein each (Premier Protein, Ensure, Boost, whey protein etc)   Add late night protein snack before bed     Follow up: 6 months with labs prior    *You may receive a survey regarding today's appointment. We would appreciate your feedback!*     Eating Tips for People with Cirrhosis    Why is nutrition important when you have cirrhosis?  When you have cirrhosis (end stage scarring of your liver), you are at risk for malnutrition (under nutrition) because cirrhosis can decrease the body's ability to utilize important nutrients.  Malnutrition in cirrhosis increases your risk for infection, confusion related to cirrhosis, fluid build up in your abdomen called ascites and fluid build up in your legs. It can also lead to muscle breakdown which can cause physical debility and possible death.     There are 3 important components to good nutrition in cirrhosis   High protein diet  Low sodium diet (keeping your sodium intake to 2,000 mg or less will help prevent and/or decrease fluid build up in your abdomen and/or legs)  Late night snack     High Protein Diet  High protein diet in cirrhosis helps maintain muscle mass, prevents malnutrition / muscle breakdown, and helps with fluid balance   Foods high in protein include the following:  Meats (recommend more poultry and fish rather than red meat) - chicken, fish, tuna in water, turkey  4 oz chicken breast = 26 grams of protein   4 oz cod or salmon filet = 20 grams of protein   4 oz ground turkey yan = 22 grams of protein   1 can of tuna = 26 grams of 
protein   Dairy - milk, cheese, Greek yogurt, cottage cheese  1 cup of milk = 8 grams of protein   1 slice of cheese = 7 grams of protein   1 Greek yogurt = 15-20 grams of protein (based on brand)   1 cup cottage cheese = 25 grams of protein   Nuts - peanuts, almonds, walnuts, cashews, nut butters including peanut butter   1 oz or handful of peanuts = 7 grams of protein   1 tbsp of peanut butter = 8 grams of protein   Beans or legumes - black beans, sin beans, kidney beans, lentils, chickpeas  1 cup of black beans = 15 grams of protein   1 cup of lentils = 18 grams of protein  Quinoa (plant-based grain protein)   1 cup of quinoa = 8 grams of protein   Tofu   1/2 cup of tofu = 10 grams of protein   Protein shakes / supplements   Examples: Boost, Ensure, Glucerna, Premier Protein, Pure Protein, Muscle Milk, whey protein powder, or vegetable based protein powder  Recommend including 1-2 protein-based shakes per day with 20-30 grams of protein in each shake     Low Sodium Diet (Less than 2,000 mg of sodium per day)   More sodium in the diet leads to fluid build up in the body which causes leg swelling and ascites (fluid in the belly)  Salt contains a high amount of sodium   Avoid adding salt to your foods    One teaspoon of salt contains 2,300 mg of sodium, which is more than your daily amount of sodium   All table salts have high amounts of sodium - sea salt, Kosher salt, iodized salt, pink Himalayan salt   Avoid the following high sodium foods:  Cured, smoked or canned meats - lunch meat, sausage, hot dogs, brats, ham   Vegetable / tomato juice  Foods with salt toppings - saltine crackers, potato chips, pretzels  Processed cheese - spreadable cheese, American cheese, Velveeta cheese  Canned vegetables unless low sodium   Canned soups or broths   Soy sauce   Pizza  Condiments - ketchup, steak sauce, salad dressings  Foods in brine - sauerkraut, pickles, pickled foods, olives  Fast foods and restaurant meals   Read 
food labels   Start paying attention to nutritional labels which include serving size and the amount of sodium in that serving size.         Late Night Protein Snack  Eating a late night protein snack can help your body from breaking down muscle for energy during the long fast from dinner to breakfast.   You can choose from the list above of high protein foods.     What should I do if I don't feel like eating?   It is common to lose your appetite when you have cirrhosis as you may have fluid build up in your abdomen causing you to feel full quicker. It is also common to have issues with nausea/ vomiting and diarrhea.   It is recommended that you try eating 6 small high protein meals throughout the day.     Multivitamin   We recommend you include a daily multivitamin such as Centrum or One-a-day     If you are struggling to follow the above diet, you can request to see a registered dietician to help with your diet changes     If you have any questions or need additional information, you can call the liver disease nurse at [phone]      
Acute UTI
0

## 2025-01-10 DIAGNOSIS — N40.0 BENIGN PROSTATIC HYPERPLASIA WITHOUT LOWER URINARY TRACT SYMPTOMS: ICD-10-CM

## 2025-01-10 DIAGNOSIS — M79.18 MYALGIA, OTHER SITE: ICD-10-CM

## 2025-01-10 DIAGNOSIS — Z79.01 LONG TERM (CURRENT) USE OF ANTICOAGULANTS: ICD-10-CM

## 2025-01-10 DIAGNOSIS — M25.551 PAIN IN RIGHT HIP: ICD-10-CM

## 2025-01-10 DIAGNOSIS — M79.621 PAIN IN RIGHT UPPER ARM: ICD-10-CM

## 2025-01-10 DIAGNOSIS — Z86.711 PERSONAL HISTORY OF PULMONARY EMBOLISM: ICD-10-CM
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
## 2025-01-10 DIAGNOSIS — I10 ESSENTIAL (PRIMARY) HYPERTENSION: ICD-10-CM

## 2025-01-10 DIAGNOSIS — E11.43 TYPE 2 DIABETES MELLITUS WITH DIABETIC AUTONOMIC (POLY)NEUROPATHY: ICD-10-CM

## 2025-01-10 DIAGNOSIS — I25.10 ATHEROSCLEROTIC HEART DISEASE OF NATIVE CORONARY ARTERY WITHOUT ANGINA PECTORIS: ICD-10-CM
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
## 2025-01-10 DIAGNOSIS — R60.0 LOCALIZED EDEMA: ICD-10-CM

## 2025-01-12 ENCOUNTER — OUTPATIENT (OUTPATIENT)
Dept: OUTPATIENT SERVICES | Facility: HOSPITAL | Age: 80
LOS: 1 days | End: 2025-01-12
Payer: MEDICARE

## 2025-01-12 PROCEDURE — 70553 MRI BRAIN STEM W/O & W/DYE: CPT

## 2025-01-12 PROCEDURE — 70553 MRI BRAIN STEM W/O & W/DYE: CPT | Mod: 26

## 2025-01-12 PROCEDURE — A9585: CPT

## 2025-01-13 RX ORDER — ALPRAZOLAM 1 MG/1
1 TABLET ORAL ONCE
Qty: 2 | Refills: 0 | Status: ACTIVE | COMMUNITY
Start: 2025-01-13 | End: 1900-01-01

## 2025-01-14 ENCOUNTER — APPOINTMENT (OUTPATIENT)
Dept: ORTHOPEDIC SURGERY | Facility: CLINIC | Age: 80
End: 2025-01-14

## 2025-01-16 ENCOUNTER — APPOINTMENT (OUTPATIENT)
Dept: NUCLEAR MEDICINE | Facility: HOSPITAL | Age: 80
End: 2025-01-16

## 2025-01-16 ENCOUNTER — OUTPATIENT (OUTPATIENT)
Dept: OUTPATIENT SERVICES | Facility: HOSPITAL | Age: 80
LOS: 1 days | End: 2025-01-16
Payer: MEDICARE

## 2025-01-16 PROCEDURE — A9584: CPT

## 2025-01-16 PROCEDURE — 78803 RP LOCLZJ TUM SPECT 1 AREA: CPT | Mod: 26

## 2025-01-16 PROCEDURE — 78803 RP LOCLZJ TUM SPECT 1 AREA: CPT

## 2025-01-17 ENCOUNTER — NON-APPOINTMENT (OUTPATIENT)
Age: 80
End: 2025-01-17

## 2025-01-17 ENCOUNTER — APPOINTMENT (OUTPATIENT)
Dept: PULMONOLOGY | Facility: CLINIC | Age: 80
End: 2025-01-17
Payer: MEDICARE

## 2025-01-17 VITALS
TEMPERATURE: 98.1 F | SYSTOLIC BLOOD PRESSURE: 110 MMHG | HEART RATE: 91 BPM | HEIGHT: 67 IN | OXYGEN SATURATION: 98 % | DIASTOLIC BLOOD PRESSURE: 65 MMHG

## 2025-01-17 DIAGNOSIS — I26.99 OTHER PULMONARY EMBOLISM W/OUT ACUTE COR PULMONALE: ICD-10-CM

## 2025-01-17 DIAGNOSIS — I82.409 ACUTE EMBOLISM AND THROMBOSIS OF UNSPECIFIED DEEP VEINS OF UNSPECIFIED LOWER EXTREMITY: ICD-10-CM

## 2025-01-17 PROCEDURE — 99215 OFFICE O/P EST HI 40 MIN: CPT

## 2025-01-17 PROCEDURE — G2211 COMPLEX E/M VISIT ADD ON: CPT

## 2025-01-22 ENCOUNTER — NON-APPOINTMENT (OUTPATIENT)
Age: 80
End: 2025-01-22

## 2025-01-24 ENCOUNTER — INPATIENT (INPATIENT)
Facility: HOSPITAL | Age: 80
LOS: 3 days | Discharge: HOME HEALTH | DRG: 57 | End: 2025-01-28
Attending: PSYCHIATRY & NEUROLOGY | Admitting: HOSPITALIST
Payer: MEDICARE

## 2025-01-24 ENCOUNTER — RESULT REVIEW (OUTPATIENT)
Age: 80
End: 2025-01-24

## 2025-01-24 VITALS
HEIGHT: 67 IN | SYSTOLIC BLOOD PRESSURE: 113 MMHG | WEIGHT: 195.11 LBS | RESPIRATION RATE: 18 BRPM | HEART RATE: 84 BPM | DIASTOLIC BLOOD PRESSURE: 76 MMHG | TEMPERATURE: 98 F | OXYGEN SATURATION: 96 %

## 2025-01-24 DIAGNOSIS — Z86.711 PERSONAL HISTORY OF PULMONARY EMBOLISM: ICD-10-CM

## 2025-01-24 DIAGNOSIS — Z29.9 ENCOUNTER FOR PROPHYLACTIC MEASURES, UNSPECIFIED: ICD-10-CM

## 2025-01-24 DIAGNOSIS — I87.8 OTHER SPECIFIED DISORDERS OF VEINS: ICD-10-CM

## 2025-01-24 DIAGNOSIS — R26.81 UNSTEADINESS ON FEET: ICD-10-CM

## 2025-01-24 DIAGNOSIS — E11.9 TYPE 2 DIABETES MELLITUS WITHOUT COMPLICATIONS: ICD-10-CM

## 2025-01-24 DIAGNOSIS — I50.810 RIGHT HEART FAILURE, UNSPECIFIED: ICD-10-CM

## 2025-01-24 DIAGNOSIS — K59.81 OGILVIE SYNDROME: ICD-10-CM

## 2025-01-24 DIAGNOSIS — Z87.438 PERSONAL HISTORY OF OTHER DISEASES OF MALE GENITAL ORGANS: ICD-10-CM

## 2025-01-24 DIAGNOSIS — Z86.79 PERSONAL HISTORY OF OTHER DISEASES OF THE CIRCULATORY SYSTEM: ICD-10-CM

## 2025-01-24 LAB
ADD ON TEST-SPECIMEN IN LAB: SIGNIFICANT CHANGE UP
ALBUMIN SERPL ELPH-MCNC: 3.7 G/DL — SIGNIFICANT CHANGE UP (ref 3.3–5)
ALP SERPL-CCNC: 65 U/L — SIGNIFICANT CHANGE UP (ref 40–120)
ALT FLD-CCNC: 6 U/L — LOW (ref 10–45)
ANION GAP SERPL CALC-SCNC: 12 MMOL/L — SIGNIFICANT CHANGE UP (ref 5–17)
APTT BLD: 31.7 SEC — SIGNIFICANT CHANGE UP (ref 24.5–35.6)
AST SERPL-CCNC: 15 U/L — SIGNIFICANT CHANGE UP (ref 10–40)
BASOPHILS # BLD AUTO: 0.03 K/UL — SIGNIFICANT CHANGE UP (ref 0–0.2)
BASOPHILS NFR BLD AUTO: 0.5 % — SIGNIFICANT CHANGE UP (ref 0–2)
BILIRUB SERPL-MCNC: 0.3 MG/DL — SIGNIFICANT CHANGE UP (ref 0.2–1.2)
BUN SERPL-MCNC: 8 MG/DL — SIGNIFICANT CHANGE UP (ref 7–23)
CALCIUM SERPL-MCNC: 9.3 MG/DL — SIGNIFICANT CHANGE UP (ref 8.4–10.5)
CHLORIDE SERPL-SCNC: 94 MMOL/L — LOW (ref 96–108)
CO2 SERPL-SCNC: 30 MMOL/L — SIGNIFICANT CHANGE UP (ref 22–31)
CREAT SERPL-MCNC: 0.6 MG/DL — SIGNIFICANT CHANGE UP (ref 0.5–1.3)
EGFR: 99 ML/MIN/1.73M2 — SIGNIFICANT CHANGE UP
EOSINOPHIL # BLD AUTO: 0.09 K/UL — SIGNIFICANT CHANGE UP (ref 0–0.5)
EOSINOPHIL NFR BLD AUTO: 1.4 % — SIGNIFICANT CHANGE UP (ref 0–6)
GLUCOSE SERPL-MCNC: 90 MG/DL — SIGNIFICANT CHANGE UP (ref 70–99)
HCT VFR BLD CALC: 40.1 % — SIGNIFICANT CHANGE UP (ref 39–50)
HGB BLD-MCNC: 12.4 G/DL — LOW (ref 13–17)
IMM GRANULOCYTES NFR BLD AUTO: 0.5 % — SIGNIFICANT CHANGE UP (ref 0–0.9)
INR BLD: 1.22 — HIGH (ref 0.85–1.16)
LYMPHOCYTES # BLD AUTO: 1.67 K/UL — SIGNIFICANT CHANGE UP (ref 1–3.3)
LYMPHOCYTES # BLD AUTO: 26 % — SIGNIFICANT CHANGE UP (ref 13–44)
MAGNESIUM SERPL-MCNC: 1.8 MG/DL — SIGNIFICANT CHANGE UP (ref 1.6–2.6)
MCHC RBC-ENTMCNC: 26.6 PG — LOW (ref 27–34)
MCHC RBC-ENTMCNC: 30.9 G/DL — LOW (ref 32–36)
MCV RBC AUTO: 86.1 FL — SIGNIFICANT CHANGE UP (ref 80–100)
MONOCYTES # BLD AUTO: 0.53 K/UL — SIGNIFICANT CHANGE UP (ref 0–0.9)
MONOCYTES NFR BLD AUTO: 8.3 % — SIGNIFICANT CHANGE UP (ref 2–14)
NEUTROPHILS # BLD AUTO: 4.07 K/UL — SIGNIFICANT CHANGE UP (ref 1.8–7.4)
NEUTROPHILS NFR BLD AUTO: 63.3 % — SIGNIFICANT CHANGE UP (ref 43–77)
NRBC # BLD: 0 /100 WBCS — SIGNIFICANT CHANGE UP (ref 0–0)
NRBC BLD-RTO: 0 /100 WBCS — SIGNIFICANT CHANGE UP (ref 0–0)
PLATELET # BLD AUTO: 352 K/UL — SIGNIFICANT CHANGE UP (ref 150–400)
POTASSIUM SERPL-MCNC: 3.9 MMOL/L — SIGNIFICANT CHANGE UP (ref 3.5–5.3)
POTASSIUM SERPL-SCNC: 3.9 MMOL/L — SIGNIFICANT CHANGE UP (ref 3.5–5.3)
PROT SERPL-MCNC: 7.2 G/DL — SIGNIFICANT CHANGE UP (ref 6–8.3)
PROTHROM AB SERPL-ACNC: 14 SEC — HIGH (ref 9.9–13.4)
RBC # BLD: 4.66 M/UL — SIGNIFICANT CHANGE UP (ref 4.2–5.8)
RBC # FLD: 14.3 % — SIGNIFICANT CHANGE UP (ref 10.3–14.5)
SODIUM SERPL-SCNC: 136 MMOL/L — SIGNIFICANT CHANGE UP (ref 135–145)
WBC # BLD: 6.42 K/UL — SIGNIFICANT CHANGE UP (ref 3.8–10.5)
WBC # FLD AUTO: 6.42 K/UL — SIGNIFICANT CHANGE UP (ref 3.8–10.5)

## 2025-01-24 PROCEDURE — 99223 1ST HOSP IP/OBS HIGH 75: CPT

## 2025-01-24 PROCEDURE — 74019 RADEX ABDOMEN 2 VIEWS: CPT | Mod: 26

## 2025-01-24 PROCEDURE — 93306 TTE W/DOPPLER COMPLETE: CPT | Mod: 26

## 2025-01-24 PROCEDURE — 93010 ELECTROCARDIOGRAM REPORT: CPT

## 2025-01-24 PROCEDURE — 71045 X-RAY EXAM CHEST 1 VIEW: CPT | Mod: 26

## 2025-01-24 PROCEDURE — 99285 EMERGENCY DEPT VISIT HI MDM: CPT

## 2025-01-24 RX ORDER — ACETAMINOPHEN, DIPHENHYDRAMINE HCL, PHENYLEPHRINE HCL 325; 25; 5 MG/1; MG/1; MG/1
3 TABLET ORAL AT BEDTIME
Refills: 0 | Status: DISCONTINUED | OUTPATIENT
Start: 2025-01-24 | End: 2025-01-28

## 2025-01-24 RX ORDER — GLUCAGON 3 MG/1
1 POWDER NASAL ONCE
Refills: 0 | Status: DISCONTINUED | OUTPATIENT
Start: 2025-01-24 | End: 2025-01-28

## 2025-01-24 RX ORDER — POLYETHYLENE GLYCOL 3350 17 G/17G
17 POWDER, FOR SOLUTION ORAL DAILY
Refills: 0 | Status: DISCONTINUED | OUTPATIENT
Start: 2025-01-24 | End: 2025-01-28

## 2025-01-24 RX ORDER — ACETAMINOPHEN 160 MG/5ML
650 SUSPENSION ORAL EVERY 6 HOURS
Refills: 0 | Status: DISCONTINUED | OUTPATIENT
Start: 2025-01-24 | End: 2025-01-28

## 2025-01-24 RX ORDER — DM/PSEUDOEPHED/ACETAMINOPHEN 10-30-250
12.5 CAPSULE ORAL ONCE
Refills: 0 | Status: DISCONTINUED | OUTPATIENT
Start: 2025-01-24 | End: 2025-01-28

## 2025-01-24 RX ORDER — INSULIN LISPRO 100/ML
VIAL (ML) SUBCUTANEOUS
Refills: 0 | Status: DISCONTINUED | OUTPATIENT
Start: 2025-01-24 | End: 2025-01-28

## 2025-01-24 RX ORDER — TAMSULOSIN HYDROCHLORIDE 0.4 MG/1
0.4 CAPSULE ORAL AT BEDTIME
Refills: 0 | Status: DISCONTINUED | OUTPATIENT
Start: 2025-01-24 | End: 2025-01-28

## 2025-01-24 RX ORDER — ONDANSETRON 4 MG/1
4 TABLET, ORALLY DISINTEGRATING ORAL EVERY 8 HOURS
Refills: 0 | Status: DISCONTINUED | OUTPATIENT
Start: 2025-01-24 | End: 2025-01-28

## 2025-01-24 RX ORDER — MAGNESIUM, ALUMINUM HYDROXIDE 200-225/5
30 SUSPENSION, ORAL (FINAL DOSE FORM) ORAL EVERY 4 HOURS
Refills: 0 | Status: DISCONTINUED | OUTPATIENT
Start: 2025-01-24 | End: 2025-01-28

## 2025-01-24 RX ORDER — SENNOSIDES 8.6 MG
2 TABLET ORAL AT BEDTIME
Refills: 0 | Status: DISCONTINUED | OUTPATIENT
Start: 2025-01-24 | End: 2025-01-28

## 2025-01-24 RX ORDER — DM/PSEUDOEPHED/ACETAMINOPHEN 10-30-250
25 CAPSULE ORAL ONCE
Refills: 0 | Status: DISCONTINUED | OUTPATIENT
Start: 2025-01-24 | End: 2025-01-28

## 2025-01-24 RX ORDER — ASPIRIN 81 MG/1
81 TABLET, COATED ORAL DAILY
Refills: 0 | Status: DISCONTINUED | OUTPATIENT
Start: 2025-01-24 | End: 2025-01-27

## 2025-01-24 RX ORDER — LIDOCAINE HYDROCHLORIDE 10 MG/ML
15 INJECTION EPIDURAL; INFILTRATION; INTRACAUDAL ONCE
Refills: 0 | Status: COMPLETED | OUTPATIENT
Start: 2025-01-24 | End: 2025-01-24

## 2025-01-24 RX ORDER — CARBIDOPA/LEVODOPA 10MG-100MG
1 TABLET ORAL EVERY 8 HOURS
Refills: 0 | Status: DISCONTINUED | OUTPATIENT
Start: 2025-01-24 | End: 2025-01-27

## 2025-01-24 RX ORDER — DM/PSEUDOEPHED/ACETAMINOPHEN 10-30-250
15 CAPSULE ORAL ONCE
Refills: 0 | Status: DISCONTINUED | OUTPATIENT
Start: 2025-01-24 | End: 2025-01-28

## 2025-01-24 RX ORDER — ENOXAPARIN SODIUM 100 MG/ML
90 INJECTION SUBCUTANEOUS EVERY 12 HOURS
Refills: 0 | Status: DISCONTINUED | OUTPATIENT
Start: 2025-01-24 | End: 2025-01-24

## 2025-01-24 RX ORDER — SODIUM CHLORIDE 9 G/ML
1000 INJECTION, SOLUTION INTRAVENOUS
Refills: 0 | Status: DISCONTINUED | OUTPATIENT
Start: 2025-01-24 | End: 2025-01-28

## 2025-01-24 RX ADMIN — LIDOCAINE HYDROCHLORIDE 15 MILLILITER(S): 10 INJECTION EPIDURAL; INFILTRATION; INTRACAUDAL at 19:16

## 2025-01-24 NOTE — ED ADULT NURSE NOTE - NSFALLRISKASMT_ED_ALL_ED_DT
Implemented All Universal Safety Interventions:  [place] to call system. Call bell, personal items and telephone within reach. Instruct patient to call for assistance. Room bathroom lighting operational. Non-slip footwear when patient is off stretcher. Physically safe environment: no spills, clutter or unnecessary equipment. Stretcher in lowest position, wheels locked, appropriate side rails in place.
24-Jan-2025 09:54

## 2025-01-24 NOTE — H&P ADULT - PROBLEM SELECTOR PLAN 9
F: ---  E: Maintain K >4, Mg >2  N: CC soft/bite sized  VTE ppx: Lovenox (full AC)  Dispo: [facility] F: ---  E: Maintain K >4, Mg >2  N: CC soft/bite sized  VTE ppx: Heparin (full AC)  Dispo: F

## 2025-01-24 NOTE — H&P ADULT - NSHPPHYSICALEXAM_GEN_ALL_CORE
General: Alert and oriented x 3. No acute distress.   HEENT: Moist mucous membranes. No scleral icterus. No cervical lymphadenopathy.  Cardiovascular: Regular rate and rhythm. No murmur. +Hepatojugular reflex  Lungs: Clear to auscultation bilaterally. No accessory muscle use.  Abdomen: Soft, non-tender and non-distended. No palpable masses.  Extremities: +4 pitting edema, R calf circumference slightly larger than R. Non-tender. Warm.  Skin: No rashes or lesions.   Neurologic:        - Motor strength: UE: 4/5 throughout, LE: 3/5 throughout, deficits most noted w/ hip flexion (R > L)       - Sensation: Tactile intact throughout. Decreased vibratory sensation in RLE.        - Reflexes: UE: +2, LE: +3       - Cerebellar: Decreased alternating movements in b/l UE (R > L)       - Unable to test gait, pt unable to take steps forward       - +Cogwheel rigidity in b/l UEs, Hypomimia.  Psychiatric: Cooperative. Appropriate mood and affect. General: Alert and oriented x 3. No acute distress.   HEENT: Moist mucous membranes. No scleral icterus. No cervical lymphadenopathy.  Cardiovascular: Regular rate and rhythm. No murmur. +Hepatojugular reflex  Lungs: Clear to auscultation bilaterally. No accessory muscle use.  Abdomen: Soft, non-tender and non-distended. No palpable masses.  Extremities: +4 pitting edema, R calf circumference slightly larger than R. Non-tender. Warm.  Skin: No rashes or lesions.   Neurologic:        - Motor strength: UE: 4/5 throughout, LE: 3/5 throughout, deficits most noted w/ hip flexion (R > L)       - Sensation: Tactile intact throughout. Decreased vibratory sensation in RLE.        - Reflexes: UE: +2, LE: +3       - Cerebellar: Decreased alternating movements in b/l UE (R > L)       - Unable to test gait, pt unable to take steps forward       - (+)Cogwheel rigidity in b/l UEs, Hypomimia.  Psychiatric: Cooperative. Appropriate mood and affect.

## 2025-01-24 NOTE — ED PROVIDER NOTE - CPE EDP CARDIAC NORM
normal...
You can access the FollowMyHealth Patient Portal offered by [facility] by registering at the following website: [email or web address]. By joining [facility]’s FollowMyHealth portal, you will also be able to view your health information using other applications (apps) compatible with our system.

## 2025-01-24 NOTE — ED PROVIDER NOTE - CONSULTANT FREE TEXT FOR MDM DISCUSSED CASE WITH QUESTION
neuro -- requesting that get ekg/trop/cxr and echo - do not feel comfortable admitting to neuro w/o card w/u given recent pe w/r heart strain

## 2025-01-24 NOTE — ED ADULT NURSE NOTE - OBJECTIVE STATEMENT
80 y/o male presents to ED sent by doctor for lumbar puncture procedure. Pt family member at bedside 80 y/o male presents to ED sent by doctor for lumbar puncture procedure. Pt family member at bedside states he had a SMITH scan performed that was abnormal so doctor sent him here for procedure. Pt also has had decline in coordination / steadiness and been less ambulatory. Presented to ED w/ wheelchair. As per pt family member pt had fall last week, unknown headstrike, denies LOC. Pt also has productive sounding cough worse when laying down. Denies cp, sob, fever/chills, n/v/d. PMH BPH, DM, HLD, CAD, HTN. Pt is A&ox4, speaking in full and complete sentences.

## 2025-01-24 NOTE — H&P ADULT - PROBLEM SELECTOR PLAN 1
Likely 2/2 Parkinson's, given bradykinesia, hypomimia, and cogwheel rigidity. C/f concurrent AI process given subacute decline. Failed bedside LP in ED, will be pursuing IR-guided LP to r/o AI process.     Plan:   - Plan for IR-guided LP on Monday (01/17)       - Holding Eliquis for procedure  - Resume home Carbidopa-Levodopa 25-100mg TID  - Neuro on board  - SLP on board given recent dysphagia w/ solids and liquids Likely 2/2 Parkinson's, given bradykinesia, hypomimia, and cogwheel rigidity. C/f concurrent AI process given uncharacteristically rapid decline over past 1y. Failed bedside LP in ED, will pursue IR-guided LP to r/o AI vs paraneoplastic process.     Plan:   - Plan for IR-guided LP on Monday (01/17)       - Holding Eliquis for procedure (last dose: 01/23)       - Heparin ggt for full AC until procedure  - Resume home Carbidopa-Levodopa 25-100mg TID  - Neuro on board  - SLP on board given recent dysphagia w/ solids and liquids

## 2025-01-24 NOTE — ED PROVIDER NOTE - OBJECTIVE STATEMENT
The pt is a 78 y/o M, PMH HTN, HLD, CAD, DM, peripheral neuropathy, spinal stenosis, ? parkinson's, PE (eliquis), who was sent in by neuro for admission - LP by IR on mon (eliquis to be held prior to procedure). Pt c/o worsening weakness over past few mon - unable to walk w/o assistance, mostly wheelchair dependent. Denies cp, sob, n/v/d, abd pain, fevers, chills, bowel or bladder incontinence.

## 2025-01-24 NOTE — ED ADULT NURSE NOTE - NSFALLRISKINTERV_ED_ALL_ED

## 2025-01-24 NOTE — H&P ADULT - PROBLEM SELECTOR PLAN 2
Seen on admission TTE. W/ +hepatojugular reflex, and +4 LE pitting edema that has worsened in recent weeks.        - TTE 01/24: LVEF 55-60%, no regional wall motion abnormalities, abnormal septal motion due to RV overload. Mildly reduced RV function. RA dilation, normal LV size. PASP 49.     Plan:  - Give additional Lasix 40 IV x1 now  - Holding home Lasix 40 PO qd for IV diuretics  - q6h bladder scans  - Strict I/O, daily weights Seen on admission TTE. W/ +hepatojugular reflex, and +4 LE pitting edema that has worsened in recent weeks. Will aim for euvolemia prior to repeat CTPE.       - TTE 01/24: LVEF 55-60%, no regional wall motion abnormalities, abnormal septal motion due to RV overload. Mildly reduced RV function. RA dilation, normal LV size. PASP 49.     Plan:  - Give additional Lasix 40 IV x1 now  - Continue IV diuresis in AM.  - Holding home Lasix 40 PO qd for IV diuretics  - q6h bladder scans  - Strict I/O, daily weights Seen on admission TTE. W/ +hepatojugular reflex, and +4 LE pitting edema that has worsened in recent weeks. Will aim for euvolemia prior to considering repeat CTPE for AC failure / partially resolved embolus r/o. Pt w/o sx of PE (i.e. hypoxia, tachycardia) on admission..       - TTE 01/24: LVEF 55-60%, no regional wall motion abnormalities, abnormal septal motion due to RV overload. Mildly reduced RV function. RA dilation, normal LV size. PASP 49.     Plan:  - Give additional Lasix 40 IV x1 now  - Continue IV diuresis in AM  - Holding home Lasix 40 PO qd for IV diuretics  - q6h bladder scans  - Strict I/O, daily weights

## 2025-01-24 NOTE — ED PROVIDER NOTE - CLINICAL SUMMARY MEDICAL DECISION MAKING FREE TEXT BOX
pt sent in by neuro for admission for LP by IR on mon (progressive weakness and no longer able to walk w/o assistance - mostly wheelchair dependent) - plan is to stop eliquis and observe in hosp, hx of pe w/r heart strain- neuro not comfortable admitting to their service given med hx and requesting med admission - willing to consider neuro admission if normal echo today - however remains unchanged from prior. labs wnl, cxr / ekg unremarkable

## 2025-01-24 NOTE — H&P ADULT - PROBLEM SELECTOR PLAN 8
F: ---  E: Maintain K >4, Mg >2  N: CC soft/bite sized  VTE ppx: Lovenox (full AC)  Dispo: [facility] Plan:  - Start Senna and Miralax  - Routine abd exams  - Ensure pt is passing gas, monitor BMs

## 2025-01-24 NOTE — H&P ADULT - NSVTERISKASSESS_GEN_ALL_CORE FT
VTE Present on Admission, Assessment Completed on: 24-Jan-2025 18:13 Medical Assessment Completed on: 24-Jan-2025 23:14

## 2025-01-24 NOTE — H&P ADULT - HISTORY OF PRESENT ILLNESS
79M PMH HTN, CAD, DM2, BPH, peripheral neuropathy, spinal stenosis, PE (dx 10/2024, on Eliquis), ileus/colonic distention, presented per outpt neurologist (***) for progressive weakness affecting ambulation for past few months.     ED Course-  ·	VS:  97.7 F oral  |  113/76 mm Hg  |  HR 84 /min  |  RR 18 /min  |  96% room air  ·	Labs:  CBC -  WBC, PLT wnl, HGB (at recent baseline)  |  CMP - Cl 94, ALT 6, otherwise wnl.  ·	Imaging:   (1) CXR: Left basilar focal atelectasis, elevation of the left hemidiaphragm. Redemonstration dextrocardia, thoracic aortic calcification  (2) LVEF 55-60%, no regional wall motion abnormalities, abnormal septal motion due to RV overload. Mildly reduced RV function. RA dilation, normal LV size. PASP 49.   ·	EKG: NSR, q-waves in V4, V5  ·	Consults: Neuro  ·	Interventions: LP (unsuccessful for arthritis) 79M PMH HTN, CAD, DM2, BPH, peripheral neuropathy, spinal stenosis, PE (dx 10/2024, on Eliquis), ileus/colonic distention, presented per outpt neurologist (Dr. Haji) for subacute, progressive gait decline first noticed 12/2023. Pt initially was able to run multiple blocks. By 05/2024, was only able to walk 1 block, and became mostly wheelchair bound by 12/2024. Pt and wife at bedside notes he's been "moving  more slowly" in recent months, and will occasionally "freeze" in place and find it difficult to move again. Notes additional difficulty getting up from and into chairs/bed, and has started to occasionally cough after eating and drinking. Pt's baseline chronic intention tremor has also worsened in the past year as well. Patient has fallen x4 since November while using a walker at home.     Pt dx w/ PE w/ RH strain at recent 10/2024 admission.     ED Course-  ·	VS:  97.7 F oral  |  113/76 mm Hg  |  HR 84 /min  |  RR 18 /min  |  96% room air  ·	Labs:  CBC -  WBC, PLT wnl, HGB (at recent baseline)  |  CMP - Cl 94, ALT 6, otherwise wnl.  ·	Imaging:   (1) CXR: Left basilar focal atelectasis, elevation of the left hemidiaphragm. Redemonstration dextrocardia, thoracic aortic calcification  (2) LVEF 55-60%, no regional wall motion abnormalities, abnormal septal motion due to RV overload. Mildly reduced RV function. RA dilation, normal LV size. PASP 49.   ·	EKG: NSR, q-waves in V4, V5, QTc 456  ·	Consults: Neuro  ·	Interventions: LP (unsuccessful for arthritis) 79M PMH HTN, CAD, DM2, BPH, peripheral neuropathy, spinal stenosis, PE (dx 10/2024, on Eliquis), ileus/colonic distention, presented per outpt neurologist (Dr. Haji) for subacute, progressive gait decline first noticed 12/2023. Pt initially was able to run multiple blocks. By 05/2024, was only able to walk 1 block, and became mostly wheelchair bound by 12/2024. Pt and wife at bedside notes he's been "moving  more slowly" in recent months, and will occasionally "freeze" in place and find it difficult to move again. Notes additional difficulty getting up from and into chairs/bed, and has started to occasionally cough after eating and drinking. Pt's baseline chronic intention tremor has also worsened in the past year as well. Patient has fallen x4 since November while using a walker at home.     Pt dx w/ PE w/ RH strain at recent 10/2024 admission. Compliant w/ home Eliquis. Noted w/ worsening of chronic LE swelling in recent weeks, alleviated by elevating legs.    ED Course-  ·	VS:  97.7 F oral  |  113/76 mm Hg  |  HR 84 /min  |  RR 18 /min  |  96% room air  ·	Labs:  CBC -  WBC, PLT wnl, HGB (at recent baseline)  |  CMP - Cl 94, ALT 6, otherwise wnl.  ·	Imaging:   (1) CXR: Left basilar focal atelectasis, elevation of the left hemidiaphragm. Redemonstration dextrocardia, thoracic aortic calcification  (2) LVEF 55-60%, no regional wall motion abnormalities, abnormal septal motion due to RV overload. Mildly reduced RV function. RA dilation, normal LV size. PASP 49.   ·	EKG: NSR, q-waves in V4, V5, QTc 456  ·	Consults: Neuro  ·	Interventions: LP (unsuccessful for arthritis) 79M PMH HTN, CAD, DM2, BPH, peripheral neuropathy, spinal stenosis, PE (dx 10/2024, on Eliquis), ileus/colonic distention, presented per outpt neurologist (Dr. Haji) for subacute, progressive gait decline first noticed 12/2023. Pt initially was able to run multiple blocks. By 05/2024, was only able to walk 1 block, and became mostly wheelchair bound by 12/2024. Pt and wife at bedside notes he's been "moving  more slowly" in recent months, and will occasionally "freeze" in place and find it difficult to move again. Notes additional difficulty getting up from and into chairs/bed, and has started to occasionally cough after eating and drinking. Pt's baseline chronic intention tremor has also worsened in the past year as well. Patient has fallen x4 since November while using a walker at home.     Pt dx w/ PE w/ RH strain at recent 10/2024 admission. Compliant w/ home Eliquis. Noted w/ worsening of chronic LE swelling in recent weeks, alleviated by elevating legs.    ED Course-  ·	VS:  97.7 F oral  |  113/76 mm Hg  |  HR 84 /min  |  RR 18 /min  |  96% room air  ·	Labs:  CBC -  WBC, PLT wnl, HGB (at recent baseline)  |  CMP - Cl 94, ALT 6, otherwise wnl.  ·	Imaging:   (1) CXR: Left basilar focal atelectasis, elevation of the left hemidiaphragm. Redemonstration dextrocardia, thoracic aortic calcification  (2) TTE: LVEF 55-60%, no regional wall motion abnormalities, abnormal septal motion due to RV overload. Mildly reduced RV function. RA dilation, normal LV size. PASP 49.   ·	EKG: NSR, q-waves in V4, V5, QTc 456  ·	Consults: Neuro  ·	Interventions: LP (unsuccessful for arthritis)

## 2025-01-24 NOTE — H&P ADULT - ASSESSMENT
79M PMH HTN, CAD, DM2, BPH, peripheral neuropathy, spinal stenosis, PE w/ RHS (dx 10/2024, on Eliquis), ileus/colonic distention, presented per outpt neurologist (Dr. Haji) for subacute, progressive gait decline, found w/ RH strain on echo, admitted for LP and RHS eval.

## 2025-01-24 NOTE — H&P ADULT - ATTENDING COMMENTS
78 yo M with PMHx psoriasis, HTN, HLD, CAD, DM (c/b peripheral neuropathy), HFpEF (LVEF 55-60%, Grade I diastolic dysfunction), recently diagnosed DVT/PE (Eliquis), spinal stenosis, Parkinsons disease px at urging of outpatient neurologist (Dr. Haji) due to concern for progressive bilateral lower extremity weakness, admitted for further neurological evaluation and management.      VSS. Satting well on RA at rest. EKG reviewed. Labs reviewed. Troponin T negative x1. Hb 12.4 (MCV 86.1). Baseline Hb 12-13 per chart review. Remainder of CBC/CMP largely within normal limits.  CXR with elevated L gualberto-diaphragm with visible bowel loops, otherwise lung fields appear clear. XR Abd continues to demonstrate dilated bowel loops concerning for pseudo-obstruction however dilation improved compared to prior abd XR and pt passing gas, having BMs which is reassuring. Recent neurological imaging and preliminary work-up concerning for autoimmune vs paraneoplastic form of Parkinsons disease. Neurology consulted in ED, tentative plan for IR-guided lumbar puncture on 1/27 however requesting pre-procedure admission in light of recently diagnosed PE with RHS during 10/2024 admission. Repeat TTE in ED demonstrating LVEF 55-60%, grade I diastolic dysfunction, RA/RV dilation, mildly reduced RV systolic function, PASP 49. Given hx of RHS and most recent TTE findings, favor holding Eliquis (last dose 1/23) and continuing full AC with Heparin gtt prior to procedure.      [ ] Neurology consulted in ED (Progressive BLE weakness)    - Concern for autoimmune etiology*   [ ] Continue home Sinemet (Concern for underlying Parkinsons disease)   [ ] IR consult (Lumbar puncture)     [ ] Hold Eliquis > Heparin gtt for full AC (Recent dx PE with RHS)   - Serial PTT while on heparin gtt    - Hold heparin gtt prior to LP per IR   [ ] Outpatient pulmonary follow-up on discharge (Dr. Nickie Watts)   - Last seen outpatient 1/17 for ongoing management of DVT/PE. Being considered for monitoring off AC i/s/o frequent falls with gait instability pending further risk/benefit discussion. Continued on Eliquis in interim.      [ ] Aspiration precautions > S/S evaluation   [ ] Fall precautions   [ ] PT evaluation 80 yo M with PMHx psoriasis, HTN, HLD, CAD, DM (c/b peripheral neuropathy), HFpEF (LVEF 55-60%, Grade I diastolic dysfunction), recently diagnosed DVT/PE (Eliquis), spinal stenosis, Parkinsons disease px at urging of outpatient neurologist (Dr. Haji) due to concern for progressive bilateral lower extremity weakness, admitted for further neurological evaluation and management.      VSS. Satting well on RA at rest. EKG reviewed. Labs reviewed. Troponin T negative x1. Hb 12.4 (MCV 86.1). Baseline Hb 12-13 per chart review. Remainder of CBC/CMP largely within normal limits.  CXR with elevated L gualberto-diaphragm with visible bowel loops, otherwise lung fields appear clear. XR Abd continues to demonstrate dilated bowel loops concerning for pseudo-obstruction however dilation improved compared to prior abd XR and pt passing gas, having BMs which is reassuring. Recent neurological imaging and preliminary work-up concerning for autoimmune vs paraneoplastic form of Parkinsons disease. Neurology consulted in ED, tentative plan for IR-guided lumbar puncture on 1/27 however requesting pre-procedure admission in light of recently diagnosed PE with RHS during 10/2024 admission. Repeat TTE in ED demonstrating LVEF 55-60%, grade I diastolic dysfunction, RA/RV dilation, mildly reduced RV systolic function, PASP 49. Given hx of RHS and most recent TTE findings, favor holding Eliquis (last dose 1/23) and continuing full AC with Heparin gtt prior to procedure.      [ ] Neurology consulted in ED (Progressive BLE weakness)    - Concern for autoimmune etiology*   [ ] Continue home Sinemet (Concern for underlying Parkinsons disease)   [ ] IR consult (Lumbar puncture)     [ ] Lasix 40mg IVP x1  [ ] Hold Eliquis > Heparin gtt for full AC (Recent dx PE with RHS)   - Serial PTT while on heparin gtt    - Hold heparin gtt prior to LP per IR   [ ] Outpatient pulmonary follow-up on discharge (Dr. Nickie Watts)   - Last seen outpatient 1/17 for ongoing management of DVT/PE. Being considered for monitoring off AC i/s/o frequent falls with gait instability pending further risk/benefit discussion. Continued on Eliquis in interim.      [ ] Aspiration precautions > S/S evaluation   [ ] Fall precautions   [ ] PT evaluation 78 yo M with PMHx psoriasis, HTN, HLD, CAD, DM (c/b peripheral neuropathy), HFpEF (LVEF 55-60%, Grade I diastolic dysfunction), recently diagnosed DVT/PE (Eliquis), spinal stenosis, Parkinsons disease px at urging of outpatient neurologist (Dr. Haji) due to concern for progressive bilateral lower extremity weakness, admitted for further neurological evaluation and management.      VSS. Satting well on RA at rest. EKG reviewed. Labs reviewed. Troponin T negative x1. Hb 12.4 (MCV 86.1). Baseline Hb 12-13 per chart review. Remainder of CBC/CMP largely within normal limits.  CXR with elevated L gualberto-diaphragm with visible bowel loops, otherwise lung fields appear clear. XR Abd continues to demonstrate dilated bowel loops concerning for pseudo-obstruction however dilation improved compared to prior abd XR and pt passing gas, having BMs which is reassuring. Regarding progressive lower extremity weakness, recent neurological imaging and preliminary work-up concerning for autoimmune vs paraneoplastic form of Parkinsons disease. Neurology consulted in ED, tentative plan for IR-guided lumbar puncture on 1/27 however requesting pre-procedure admission in light of recently diagnosed PE with RHS during 10/2024 admission. Repeat TTE in ED demonstrating LVEF 55-60%, grade I diastolic dysfunction, RA/RV dilation, mildly reduced RV systolic function, PASP 49. Given hx of RHS and most recent TTE findings, favor holding Eliquis (last dose 1/23) and continuing full AC with Heparin gtt prior to procedure.      [ ] Neurology consulted in ED (Progressive BLE weakness)    - Concern for autoimmune etiology*   [ ] Continue home Sinemet (Concern for underlying Parkinsons disease)   [ ] IR consult (Lumbar puncture)     [ ] Lasix 40mg IVP x1  [ ] Hold Eliquis > Heparin gtt for full AC (Recent dx PE with RHS)   - Serial PTT while on heparin gtt    - Hold heparin gtt prior to LP per IR   [ ] Outpatient pulmonary follow-up on discharge (Dr. Nickie Watts)   - Last seen outpatient 1/17 for ongoing management of DVT/PE. Being considered for monitoring off AC i/s/o frequent falls with gait instability pending further risk/benefit discussion. Continued on Eliquis in interim.      [ ] Aspiration precautions > S/S evaluation   [ ] Fall precautions   [ ] PT evaluation 80 yo M with PMHx psoriasis, HTN, HLD, CAD, DM (c/b peripheral neuropathy), HFpEF (LVEF 55-60%, Grade I diastolic dysfunction), recently diagnosed DVT/PE (Eliquis), spinal stenosis px at urging of outpatient neurologist (Dr. Haji) due to concern for progressive bilateral lower extremity weakness, admitted for further neurological evaluation and management.      VSS. Satting well on RA at rest. EKG reviewed. Labs reviewed. Troponin T negative x1. Hb 12.4 (MCV 86.1). Baseline Hb 12-13 per chart review. Remainder of CBC/CMP largely within normal limits.  CXR with elevated L gualberto-diaphragm with visible bowel loops, otherwise lung fields appear clear. XR Abd continues to demonstrate dilated bowel loops concerning for pseudo-obstruction however dilation improved compared to prior abd XR and pt passing gas, having BMs which is reassuring. Regarding progressive lower extremity weakness, recent neurological imaging and preliminary work-up concerning for autoimmune vs paraneoplastic form of Parkinsons disease. Neurology consulted in ED, tentative plan for IR-guided lumbar puncture on 1/27 however requesting pre-procedure admission in light of recently diagnosed PE with RHS during 10/2024 admission. Repeat TTE in ED demonstrating LVEF 55-60%, grade I diastolic dysfunction, RA/RV dilation, mildly reduced RV systolic function, PASP 49. Given hx of RHS and most recent TTE findings, favor holding Eliquis (last dose 1/23) and continuing full AC with Heparin gtt prior to procedure.      [ ] Neurology consulted in ED (Progressive BLE weakness)    - Concern for autoimmune etiology*   [ ] Continue home Sinemet (Concern for underlying Parkinsons disease)   [ ] IR consult (Lumbar puncture)     [ ] Lasix 40mg IVP x1  [ ] Hold Eliquis > Heparin gtt for full AC (Recent dx PE with RHS)   - Serial PTT while on heparin gtt    - Hold heparin gtt prior to LP per IR   [ ] Outpatient pulmonary follow-up on discharge (Dr. Nickie Watts)   - Last seen outpatient 1/17 for ongoing management of DVT/PE. Being considered for monitoring off AC i/s/o frequent falls with gait instability pending further risk/benefit discussion. Continued on Eliquis in interim.      [ ] Aspiration precautions > S/S evaluation   [ ] Fall precautions   [ ] PT evaluation

## 2025-01-24 NOTE — H&P ADULT - PROBLEM SELECTOR PLAN 3
w/ RH strain. Dx 10/2024, likely provoked PE from immobilization and recent hospitalization. Also w/ +DVT study at that time. Compliant w/ Eliquis, last dose: 01/23     Plan:  - Start Lovenox for full AC, last dose Sun PM  - Holding home Eliquis for LP on Monday  - ?Repeat CTPE  - F/u US b/l LE for DVT r/o  - Outpt pulm f/u w/ RH strain. Dx 10/2024, likely provoked PE from immobilization and recent hospitalization. Also w/ +DVT study at that time. Compliant w/ Eliquis, last dose: 01/23        - Per outpt pulm notes, considered d/c Eliquis for fall risk    Plan:  - Start Heparin ggt for full AC  - Holding home Eliquis for LP on Monday  - Outpt pulm f/u w/ RH strain. Dx 10/2024, likely provoked PE from immobilization and recent hospitalization. Also w/ +DVT study at that time. Compliant w/ Eliquis, last dose: 01/23        - Per outpt pulm notes, considered d/c Eliquis for fall risk    Plan:  - Start Heparin ggt for full AC  - Holding home Eliquis for LP on Monday  - Outpt pulm f/u (Dr. Watts)

## 2025-01-24 NOTE — H&P ADULT - PROBLEM SELECTOR PLAN 6
Plan:  - C/w mISS  - F/u A1c  - Consistent carb diet   - Holding home metformin 500mg qd  - Holding home Ozempic

## 2025-01-24 NOTE — CONSULT NOTE ADULT - ATTENDING COMMENTS
79-year-old with multiple medical comorbidities admitted to internal medicine with recent pulmonary embolus October 2024 on Eliquis with evidence of right heart strain on echocardiogram January 24, 2025.  Eliquis held for bedside lumbar puncture to investigate a rapidly progressive parkinsonism with abnormal DaTscan.  Bedside lumbar puncture January 24, 2025 was unsuccessful, switched to heparin drip, PTT 51 by primary team.  Plan for IR guided lumbar puncture next week.  Rest of management per primary team.

## 2025-01-24 NOTE — H&P ADULT - NSHPLABSRESULTS_GEN_ALL_CORE
.  LABS:                         12.4   6.42  )-----------( 352      ( 24 Jan 2025 08:52 )             40.1     01-24    136  |  94[L]  |  8   ----------------------------<  90  3.9   |  30  |  0.60    Ca    9.3      24 Jan 2025 08:52  Mg     1.8     01-24    TPro  7.2  /  Alb  3.7  /  TBili  0.3  /  DBili  x   /  AST  15  /  ALT  6[L]  /  AlkPhos  65  01-24    PT/INR - ( 24 Jan 2025 08:52 )   PT: 14.0 sec;   INR: 1.22          PTT - ( 24 Jan 2025 08:52 )  PTT:31.7 sec  Urinalysis Basic - ( 24 Jan 2025 08:52 )    Color: x / Appearance: x / SG: x / pH: x  Gluc: 90 mg/dL / Ketone: x  / Bili: x / Urobili: x   Blood: x / Protein: x / Nitrite: x   Leuk Esterase: x / RBC: x / WBC x   Sq Epi: x / Non Sq Epi: x / Bacteria: x                RADIOLOGY, EKG & ADDITIONAL TESTS: Reviewed.

## 2025-01-24 NOTE — ED PROVIDER NOTE - ATTENDING APP SHARED VISIT CONTRIBUTION OF CARE
Pt is a 78yo m, h/o htn, cad, dm, bph, pe on eliquis since Oct '24, who p/w progressive weakness since then, with difficulty walking, dependent on wheelchair for mobility. No back pain, leg pain, focal numbness, tingling. No f/c. Pt was referred to ed for admission for LP as part of work up for progressive weakness. Afebrile. HDS. PE unremarkable. Will obtain labs, contact neuro to discuss admission.

## 2025-01-24 NOTE — PROCEDURE NOTE - NSPROCDETAILS_GEN_ALL_CORE
procedure aborted location identified, draped/prepped, sterile technique used, needle inserted/introduced/procedure aborted

## 2025-01-24 NOTE — CONSULT NOTE ADULT - SUBJECTIVE AND OBJECTIVE BOX
Neurology Consult    Patient is a 79y old  Male who presents with a chief complaint of weakness, difficulty ambulating (24 Jan 2025 20:12)      HPI:  79M PMH HTN, CAD, DM2, BPH, peripheral neuropathy, spinal stenosis, PE (dx 10/2024, on Eliquis), ileus/colonic distention, presented per outpt neurologist (Dr. Haji) for subacute, progressive gait decline first noticed 12/2023. Pt initially was able to run multiple blocks. By 05/2024, was only able to walk 1 block, and became mostly wheelchair bound by 12/2024. Pt and wife at bedside notes he's been "moving  more slowly" in recent months, and will occasionally "freeze" in place and find it difficult to move again. Notes additional difficulty getting up from and into chairs/bed, and has started to occasionally cough after eating and drinking. Pt's baseline chronic intention tremor has also worsened in the past year as well. Patient has fallen x4 since November while using a walker at home.     Pt dx w/ PE w/ RH strain at recent 10/2024 admission.     ED Course-  ·	VS:  97.7 F oral  |  113/76 mm Hg  |  HR 84 /min  |  RR 18 /min  |  96% room air  ·	Labs:  CBC -  WBC, PLT wnl, HGB (at recent baseline)  |  CMP - Cl 94, ALT 6, otherwise wnl.  ·	Imaging:   (1) CXR: Left basilar focal atelectasis, elevation of the left hemidiaphragm. Redemonstration dextrocardia, thoracic aortic calcification  (2) LVEF 55-60%, no regional wall motion abnormalities, abnormal septal motion due to RV overload. Mildly reduced RV function. RA dilation, normal LV size. PASP 49.   ·	EKG: NSR, q-waves in V4, V5, QTc 456  ·	Consults: Neuro  ·	Interventions: LP (unsuccessful for arthritis) (24 Jan 2025 20:12)      PAST MEDICAL & SURGICAL HISTORY:  HTN (hypertension)      Cirrhosis      CAD (coronary artery disease)      HLD (hyperlipidemia)      T2DM (type 2 diabetes mellitus)      BPH (benign prostatic hyperplasia)      No significant past surgical history          FAMILY HISTORY:      Social History: (-) x 3    Allergies    No Known Allergies    Intolerances        MEDICATIONS  (STANDING):  polyethylene glycol 3350 17 Gram(s) Oral daily  senna 2 Tablet(s) Oral at bedtime    MEDICATIONS  (PRN):  acetaminophen     Tablet .. 650 milliGRAM(s) Oral every 6 hours PRN Temp greater or equal to 38C (100.4F), Mild Pain (1 - 3)  aluminum hydroxide/magnesium hydroxide/simethicone Suspension 30 milliLiter(s) Oral every 4 hours PRN Dyspepsia  melatonin 3 milliGRAM(s) Oral at bedtime PRN Insomnia  ondansetron Injectable 4 milliGRAM(s) IV Push every 8 hours PRN Nausea and/or Vomiting      Review of systems:    Constitutional: as per HPI  Eyes: No eye pain or discharge  ENMT:  No difficulty hearing; No sinus or throat pain  Neck: No pain or stiffness  Respiratory: No cough, wheezing, chills or hemoptysis  Cardiovascular: No chest pain, palpitations, shortness of breath, dyspnea on exertion  Gastrointestinal: No abdominal pain, nausea, vomiting or hematemesis; No diarrhea or constipation.   Genitourinary: No dysuria, frequency, hematuria or incontinence  Neurological: As per HPI  Skin: No rashes or lesions   Endocrine: No heat or cold intolerance; No hair loss  Musculoskeletal: No joint pain or swelling  Psychiatric: No depression, anxiety, mood swings  Heme/Lymph: No easy bruising or bleeding gums    Vital Signs Last 24 Hrs  T(C): 36.6 (24 Jan 2025 19:57), Max: 36.6 (24 Jan 2025 19:57)  T(F): 97.8 (24 Jan 2025 19:57), Max: 97.8 (24 Jan 2025 19:57)  HR: 96 (24 Jan 2025 19:57) (84 - 96)  BP: 103/66 (24 Jan 2025 19:57) (103/66 - 116/70)  BP(mean): --  RR: 18 (24 Jan 2025 19:57) (18 - 18)  SpO2: 98% (24 Jan 2025 19:57) (96% - 98%)    Parameters below as of 24 Jan 2025 19:57  Patient On (Oxygen Delivery Method): room air        Examination:  General:  Appearance is consistent with chronologic age.  No abnormal facies.  Gross skin survey within normal limits.    Cognitive/Language:  The patient is oriented to person, place, time and date.  Recent and remote memory intact.  Fund of knowledge is intact and normal.  Language with normal repetition, comprehension and naming.  Nondysarthric.    Eyes: intact VA, VFF.  EOMI w/o nystagmus, skew or reported double vision.  PERRL.  No ptosis/weakness of eyelid closure.    Face:  Facial sensation normal V1 - 3, no facial asymmetry.    Ears/Nose/Throat:  Hearing grossly intact b/l.  Palate elevates midline.  Tongue and uvula midline.   Motor examination:   Normal tone, bulk and range of motion.  No tenderness, twitching, tremors or involuntary movements.  Formal Muscle Strength Testing: (MRC grade R/L) 5/5 UE; 5/5 LE.  No observable drift.  Reflexes:   2+ b/l pectoralis, biceps, triceps, brachioradialis, patella and Achilles.  Plantar response downgoing b/l.  Jaw jerk, Jory, clonus absent.  Sensory examination:   Intact to light touch and pinprick, pain, temperature and proprioception and vibration in all extremities.  Cerebellum:   FTN/HKS intact with normal DANISH in all limbs.  No dysmetria or dysdiadokinesia.  Gait narrow based and normal.    Respiratory:  no audible wheezing or inspiratory stridor.  no use of accessory muscles.   Cardiac: pulse palpable, no audible bruits  Abdomen: supple, no guarding, no TTP    Labs:   CBC Full  -  ( 24 Jan 2025 08:52 )  WBC Count : 6.42 K/uL  RBC Count : 4.66 M/uL  Hemoglobin : 12.4 g/dL  Hematocrit : 40.1 %  Platelet Count - Automated : 352 K/uL  Mean Cell Volume : 86.1 fl  Mean Cell Hemoglobin : 26.6 pg  Mean Cell Hemoglobin Concentration : 30.9 g/dL  Auto Neutrophil # : 4.07 K/uL  Auto Lymphocyte # : 1.67 K/uL  Auto Monocyte # : 0.53 K/uL  Auto Eosinophil # : 0.09 K/uL  Auto Basophil # : 0.03 K/uL  Auto Neutrophil % : 63.3 %  Auto Lymphocyte % : 26.0 %  Auto Monocyte % : 8.3 %  Auto Eosinophil % : 1.4 %  Auto Basophil % : 0.5 %    01-24    136  |  94[L]  |  8   ----------------------------<  90  3.9   |  30  |  0.60    Ca    9.3      24 Jan 2025 08:52  Mg     1.8     01-24    TPro  7.2  /  Alb  3.7  /  TBili  0.3  /  DBili  x   /  AST  15  /  ALT  6[L]  /  AlkPhos  65  01-24    LIVER FUNCTIONS - ( 24 Jan 2025 08:52 )  Alb: 3.7 g/dL / Pro: 7.2 g/dL / ALK PHOS: 65 U/L / ALT: 6 U/L / AST: 15 U/L / GGT: x           PT/INR - ( 24 Jan 2025 08:52 )   PT: 14.0 sec;   INR: 1.22          PTT - ( 24 Jan 2025 08:52 )  PTT:31.7 sec  Urinalysis Basic - ( 24 Jan 2025 08:52 )    Color: x / Appearance: x / SG: x / pH: x  Gluc: 90 mg/dL / Ketone: x  / Bili: x / Urobili: x   Blood: x / Protein: x / Nitrite: x   Leuk Esterase: x / RBC: x / WBC x   Sq Epi: x / Non Sq Epi: x / Bacteria: x          Neuroimaging:  [facility]:     01-24-25 @ 23:04       Neurology Consult    Patient is a 79y old  Male who presents with a chief complaint of weakness, difficulty ambulating (24 Jan 2025 20:12)      HPI:  Patient is a 79M w/PMH HTN, CAD, DM2, BPH, peripheral neuropathy, spinal stenosis, PE (dx 10/2024, on Eliquis), ileus/colonic distention, presented to the ED as per instructions of outpt neurologist, Dr. Haji, for a diagnostic lumbar puncture and management of possible autoimmune or paraneoplastic etiology of his subacute progressive parkinsonism that started around 12/2023. He and his fiance report that around 12/2023, he was still able to run and olivier people down but since then, he has had some gradual decline in walking speed. By 05/2024, he was only able to walk 1 block in 15 minutes. Since then, he progressively had more difficulty lifting his legs resulting in sustaining a fall in July 2024 without loss of consciousness or head-strike, followed by needing a rolling walker for assistance to walk in September 2024, then followed by another fall in November 2024 and requiring a wheelchair for assistance for when he goes outside his home. Since after his appointment with Dr. Haji in Dec 2024, he has fallen an additional 4 times. In addition, he has been halting at times while walking. His fiance also reports episodes of coughing fits when he drinks water or takes his medication, which also started around May 2024. His facial expression have also become more subdued in this time frame. He also reports resting tremors which he has had for at least a year. He has had chronic lower extremity numbness and neuropathic pain but he believes they may have worsened since May. He denies new headaches, diplopia, blurred vision, dysarthria, aphasia, and bowel or bladder dysfunction.    In January 2025, he had a Mine scan showing no activity detected in either striata, which is atypical in Parkinson's disease and suggests possible autoimmune or paraneoplastic etiology.    PAST MEDICAL & SURGICAL HISTORY:  HTN (hypertension)      Cirrhosis      CAD (coronary artery disease)      HLD (hyperlipidemia)      T2DM (type 2 diabetes mellitus)      BPH (benign prostatic hyperplasia)      No significant past surgical history          FAMILY HISTORY:      Social History: (-) x 3    Allergies    No Known Allergies    Intolerances        MEDICATIONS  (STANDING):  polyethylene glycol 3350 17 Gram(s) Oral daily  senna 2 Tablet(s) Oral at bedtime    MEDICATIONS  (PRN):  acetaminophen     Tablet .. 650 milliGRAM(s) Oral every 6 hours PRN Temp greater or equal to 38C (100.4F), Mild Pain (1 - 3)  aluminum hydroxide/magnesium hydroxide/simethicone Suspension 30 milliLiter(s) Oral every 4 hours PRN Dyspepsia  melatonin 3 milliGRAM(s) Oral at bedtime PRN Insomnia  ondansetron Injectable 4 milliGRAM(s) IV Push every 8 hours PRN Nausea and/or Vomiting      Review of systems:    Constitutional: as per HPI  Eyes: No eye pain or discharge  ENMT:  No difficulty hearing; No sinus or throat pain  Neck: No pain or stiffness  Respiratory: No cough, wheezing, chills or hemoptysis  Cardiovascular: No chest pain, palpitations, shortness of breath, dyspnea on exertion  Gastrointestinal: No abdominal pain, nausea, vomiting or hematemesis; No diarrhea or constipation.   Genitourinary: No dysuria, frequency, hematuria or incontinence  Neurological: As per HPI  Skin: No rashes or lesions   Endocrine: No heat or cold intolerance; No hair loss  Musculoskeletal: No joint pain or swelling  Psychiatric: No depression, anxiety, mood swings  Heme/Lymph: No easy bruising or bleeding gums    Vital Signs Last 24 Hrs  T(C): 36.6 (24 Jan 2025 19:57), Max: 36.6 (24 Jan 2025 19:57)  T(F): 97.8 (24 Jan 2025 19:57), Max: 97.8 (24 Jan 2025 19:57)  HR: 96 (24 Jan 2025 19:57) (84 - 96)  BP: 103/66 (24 Jan 2025 19:57) (103/66 - 116/70)  BP(mean): --  RR: 18 (24 Jan 2025 19:57) (18 - 18)  SpO2: 98% (24 Jan 2025 19:57) (96% - 98%)    Parameters below as of 24 Jan 2025 19:57  Patient On (Oxygen Delivery Method): room air    <<<<<NEURO EXAM>>>>>  General: Appearance is consistent with chronologic age. No abnormal facies. Gross skin survey within normal limits.    Cognitive/Language:  The patient is oriented to person, place, time and date. Recent and remote memory intact. Occasionally dosed off while I was in the middle of talking to him.  Language with normal repetition, comprehension and naming. Nondysarthric. Had some hypophonia  Eyes: VFF. BL exotropia, had delayed halting saccades but otherwise, EOMI. PERRL. No ptosis/weakness of eyelid closure.    Face:  Facial sensation normal V1 - 3, no facial asymmetry. Severe hypomimia.  Ears/Nose/Throat: Hearing grossly intact b/l. Palate elevates midline. Tongue and uvula midline.   Motor examination: Cogwheel rigidity in BL UE worse in RUE compared to LUE, Has BL UE intentional and postural tremors.   Strength Exam (MRC scale): 4+/5 BL UE strength, 3/5 BL hip flexion, 4/5 BL knee extension and flexion, 4+/5 BL dorsiflexion and plantarflexion  Reflexes: 2+ b/l biceps, triceps, brachioradialis, and Achilles reflexes, 3+ patellar reflexes with crossed adductors  Sensory examination: intact and symmetric to light touch, vibration, and temperature in BL UE, decreased to vibration, light touch, and temperature at bilateral feet, with sensation increasing proximally, with LLE worse than RLE  Cerebellum: FTN intact with intentional tremor. Has decrement of rapid movement of LE equal bilaterally, with decrement of rapid movement and rapid alternating movement in UE worse on RUE compared to LUE.  Gait: Patient required assistance of examiner and fiance to stand and was unable to take a step forward, which appears worse compared to prior exam in 12/2024    Labs:   CBC Full  -  ( 24 Jan 2025 08:52 )  WBC Count : 6.42 K/uL  RBC Count : 4.66 M/uL  Hemoglobin : 12.4 g/dL  Hematocrit : 40.1 %  Platelet Count - Automated : 352 K/uL  Mean Cell Volume : 86.1 fl  Mean Cell Hemoglobin : 26.6 pg  Mean Cell Hemoglobin Concentration : 30.9 g/dL  Auto Neutrophil # : 4.07 K/uL  Auto Lymphocyte # : 1.67 K/uL  Auto Monocyte # : 0.53 K/uL  Auto Eosinophil # : 0.09 K/uL  Auto Basophil # : 0.03 K/uL  Auto Neutrophil % : 63.3 %  Auto Lymphocyte % : 26.0 %  Auto Monocyte % : 8.3 %  Auto Eosinophil % : 1.4 %  Auto Basophil % : 0.5 %    01-24    136  |  94[L]  |  8   ----------------------------<  90  3.9   |  30  |  0.60    Ca    9.3      24 Jan 2025 08:52  Mg     1.8     01-24    TPro  7.2  /  Alb  3.7  /  TBili  0.3  /  DBili  x   /  AST  15  /  ALT  6[L]  /  AlkPhos  65  01-24    LIVER FUNCTIONS - ( 24 Jan 2025 08:52 )  Alb: 3.7 g/dL / Pro: 7.2 g/dL / ALK PHOS: 65 U/L / ALT: 6 U/L / AST: 15 U/L / GGT: x           PT/INR - ( 24 Jan 2025 08:52 )   PT: 14.0 sec;   INR: 1.22          PTT - ( 24 Jan 2025 08:52 )  PTT:31.7 sec  Urinalysis Basic - ( 24 Jan 2025 08:52 )    Color: x / Appearance: x / SG: x / pH: x  Gluc: 90 mg/dL / Ketone: x  / Bili: x / Urobili: x   Blood: x / Protein: x / Nitrite: x   Leuk Esterase: x / RBC: x / WBC x   Sq Epi: x / Non Sq Epi: x / Bacteria: x          Neuroimaging:  [facility]:     01-24-25 @ 23:04

## 2025-01-24 NOTE — H&P ADULT - NSHPSOCIALHISTORY_GEN_ALL_CORE
Mobility: Mainly wheelchair-bound, occasional walker use at home  Functional status: dependent  Alcohol Use: none  Tobacco Use: none  Cannabis Use: none  Substance Use: none

## 2025-01-25 LAB
A1C WITH ESTIMATED AVERAGE GLUCOSE RESULT: 6.3 % — HIGH (ref 4–5.6)
ALBUMIN SERPL ELPH-MCNC: 3.6 G/DL — SIGNIFICANT CHANGE UP (ref 3.3–5)
ALP SERPL-CCNC: 67 U/L — SIGNIFICANT CHANGE UP (ref 40–120)
ALT FLD-CCNC: 10 U/L — SIGNIFICANT CHANGE UP (ref 10–45)
ANION GAP SERPL CALC-SCNC: 14 MMOL/L — SIGNIFICANT CHANGE UP (ref 5–17)
APTT BLD: 51.1 SEC — HIGH (ref 24.5–35.6)
APTT BLD: 64.1 SEC — HIGH (ref 24.5–35.6)
APTT BLD: 76.8 SEC — HIGH (ref 24.5–35.6)
AST SERPL-CCNC: 16 U/L — SIGNIFICANT CHANGE UP (ref 10–40)
BASOPHILS # BLD AUTO: 0.03 K/UL — SIGNIFICANT CHANGE UP (ref 0–0.2)
BASOPHILS NFR BLD AUTO: 0.5 % — SIGNIFICANT CHANGE UP (ref 0–2)
BILIRUB SERPL-MCNC: 0.4 MG/DL — SIGNIFICANT CHANGE UP (ref 0.2–1.2)
BLD GP AB SCN SERPL QL: NEGATIVE — SIGNIFICANT CHANGE UP
BUN SERPL-MCNC: 8 MG/DL — SIGNIFICANT CHANGE UP (ref 7–23)
CALCIUM SERPL-MCNC: 9.4 MG/DL — SIGNIFICANT CHANGE UP (ref 8.4–10.5)
CHLORIDE SERPL-SCNC: 96 MMOL/L — SIGNIFICANT CHANGE UP (ref 96–108)
CO2 SERPL-SCNC: 28 MMOL/L — SIGNIFICANT CHANGE UP (ref 22–31)
CREAT SERPL-MCNC: 0.61 MG/DL — SIGNIFICANT CHANGE UP (ref 0.5–1.3)
EGFR: 99 ML/MIN/1.73M2 — SIGNIFICANT CHANGE UP
EOSINOPHIL # BLD AUTO: 0.09 K/UL — SIGNIFICANT CHANGE UP (ref 0–0.5)
EOSINOPHIL NFR BLD AUTO: 1.5 % — SIGNIFICANT CHANGE UP (ref 0–6)
ESTIMATED AVERAGE GLUCOSE: 134 MG/DL — HIGH (ref 68–114)
GLUCOSE SERPL-MCNC: 77 MG/DL — SIGNIFICANT CHANGE UP (ref 70–99)
HCT VFR BLD CALC: 40.4 % — SIGNIFICANT CHANGE UP (ref 39–50)
HGB BLD-MCNC: 13.1 G/DL — SIGNIFICANT CHANGE UP (ref 13–17)
IMM GRANULOCYTES NFR BLD AUTO: 0.5 % — SIGNIFICANT CHANGE UP (ref 0–0.9)
LYMPHOCYTES # BLD AUTO: 1.73 K/UL — SIGNIFICANT CHANGE UP (ref 1–3.3)
LYMPHOCYTES # BLD AUTO: 28 % — SIGNIFICANT CHANGE UP (ref 13–44)
MAGNESIUM SERPL-MCNC: 1.8 MG/DL — SIGNIFICANT CHANGE UP (ref 1.6–2.6)
MCHC RBC-ENTMCNC: 27.8 PG — SIGNIFICANT CHANGE UP (ref 27–34)
MCHC RBC-ENTMCNC: 32.4 G/DL — SIGNIFICANT CHANGE UP (ref 32–36)
MCV RBC AUTO: 85.6 FL — SIGNIFICANT CHANGE UP (ref 80–100)
MONOCYTES # BLD AUTO: 0.58 K/UL — SIGNIFICANT CHANGE UP (ref 0–0.9)
MONOCYTES NFR BLD AUTO: 9.4 % — SIGNIFICANT CHANGE UP (ref 2–14)
NEUTROPHILS # BLD AUTO: 3.72 K/UL — SIGNIFICANT CHANGE UP (ref 1.8–7.4)
NEUTROPHILS NFR BLD AUTO: 60.1 % — SIGNIFICANT CHANGE UP (ref 43–77)
NRBC # BLD: 0 /100 WBCS — SIGNIFICANT CHANGE UP (ref 0–0)
NRBC BLD-RTO: 0 /100 WBCS — SIGNIFICANT CHANGE UP (ref 0–0)
PHOSPHATE SERPL-MCNC: 3.9 MG/DL — SIGNIFICANT CHANGE UP (ref 2.5–4.5)
PLATELET # BLD AUTO: 324 K/UL — SIGNIFICANT CHANGE UP (ref 150–400)
POTASSIUM SERPL-MCNC: 3.7 MMOL/L — SIGNIFICANT CHANGE UP (ref 3.5–5.3)
POTASSIUM SERPL-SCNC: 3.7 MMOL/L — SIGNIFICANT CHANGE UP (ref 3.5–5.3)
PROT SERPL-MCNC: 7.2 G/DL — SIGNIFICANT CHANGE UP (ref 6–8.3)
RBC # BLD: 4.72 M/UL — SIGNIFICANT CHANGE UP (ref 4.2–5.8)
RBC # FLD: 14.4 % — SIGNIFICANT CHANGE UP (ref 10.3–14.5)
RH IG SCN BLD-IMP: POSITIVE — SIGNIFICANT CHANGE UP
SODIUM SERPL-SCNC: 138 MMOL/L — SIGNIFICANT CHANGE UP (ref 135–145)
WBC # BLD: 6.18 K/UL — SIGNIFICANT CHANGE UP (ref 3.8–10.5)
WBC # FLD AUTO: 6.18 K/UL — SIGNIFICANT CHANGE UP (ref 3.8–10.5)

## 2025-01-25 PROCEDURE — 99232 SBSQ HOSP IP/OBS MODERATE 35: CPT | Mod: GC

## 2025-01-25 PROCEDURE — 99223 1ST HOSP IP/OBS HIGH 75: CPT

## 2025-01-25 RX ORDER — HEPARIN SODIUM,PORCINE 10000/ML
1600 VIAL (ML) INJECTION
Qty: 25000 | Refills: 0 | Status: DISCONTINUED | OUTPATIENT
Start: 2025-01-25 | End: 2025-01-27

## 2025-01-25 RX ADMIN — Medication 5 MILLIGRAM(S): at 22:33

## 2025-01-25 RX ADMIN — TAMSULOSIN HYDROCHLORIDE 0.4 MILLIGRAM(S): 0.4 CAPSULE ORAL at 22:33

## 2025-01-25 RX ADMIN — Medication 16 UNIT(S)/HR: at 00:32

## 2025-01-25 RX ADMIN — Medication 1 TABLET(S): at 05:47

## 2025-01-25 RX ADMIN — POLYETHYLENE GLYCOL 3350 17 GRAM(S): 17 POWDER, FOR SOLUTION ORAL at 13:14

## 2025-01-25 RX ADMIN — Medication 16 UNIT(S)/HR: at 19:21

## 2025-01-25 RX ADMIN — Medication 2 TABLET(S): at 22:32

## 2025-01-25 RX ADMIN — Medication 1 TABLET(S): at 22:33

## 2025-01-25 RX ADMIN — ASPIRIN 81 MILLIGRAM(S): 81 TABLET, COATED ORAL at 13:12

## 2025-01-25 RX ADMIN — Medication 40 MILLIGRAM(S): at 05:47

## 2025-01-25 RX ADMIN — Medication 1 TABLET(S): at 13:12

## 2025-01-25 NOTE — CONSULT NOTE ADULT - SUBJECTIVE AND OBJECTIVE BOX
Patient is a 79y old  Male who presents with a chief complaint of weakness, difficulty ambulating (24 Jan 2025 23:02)      HPI:  79M PMH HTN, CAD, DM2, BPH, peripheral neuropathy, spinal stenosis, PE (dx 10/2024, on Eliquis), ileus/colonic distention, presented per outpt neurologist (Dr. Sol) for subacute, progressive gait decline first noticed 12/2023. Pt initially was able to run multiple blocks. By 05/2024, was only able to walk 1 block, and became mostly wheelchair bound by 12/2024. Pt and wife at bedside notes he's been "moving  more slowly" in recent months, and will occasionally "freeze" in place and find it difficult to move again. Notes additional difficulty getting up from and into chairs/bed, and has started to occasionally cough after eating and drinking. Pt's baseline chronic intention tremor has also worsened in the past year as well. Patient has fallen x4 since November while using a walker at home.     Pt dx w/ PE w/ RH strain at recent 10/2024 admission. Compliant w/ home Eliquis. Noted w/ worsening of chronic LE swelling in recent weeks, alleviated by elevating legs.    ED Course-  ·	VS:  97.7 F oral  |  113/76 mm Hg  |  HR 84 /min  |  RR 18 /min  |  96% room air  ·	Labs:  CBC -  WBC, PLT wnl, HGB (at recent baseline)  |  CMP - Cl 94, ALT 6, otherwise wnl.  ·	Imaging:   (1) CXR: Left basilar focal atelectasis, elevation of the left hemidiaphragm. Redemonstration dextrocardia, thoracic aortic calcification  (2) TTE: LVEF 55-60%, no regional wall motion abnormalities, abnormal septal motion due to RV overload. Mildly reduced RV function. RA dilation, normal LV size. PASP 49.   ·	EKG: NSR, q-waves in V4, V5, QTc 456  ·	Consults: Neuro  ·	Interventions: LP (unsuccessful for arthritis) (24 Jan 2025 20:12)    PAST MEDICAL & SURGICAL HISTORY:  HTN (hypertension)      Cirrhosis      CAD (coronary artery disease)      HLD (hyperlipidemia)      T2DM (type 2 diabetes mellitus)      BPH (benign prostatic hyperplasia)      No significant past surgical history        MEDICATIONS  (STANDING):  aspirin  chewable 81 milliGRAM(s) Oral daily  carbidopa/levodopa  25/100 1 Tablet(s) Oral every 8 hours  dextrose 5%. 1000 milliLiter(s) (100 mL/Hr) IV Continuous <Continuous>  dextrose 5%. 1000 milliLiter(s) (50 mL/Hr) IV Continuous <Continuous>  dextrose 50% Injectable 25 Gram(s) IV Push once  dextrose 50% Injectable 12.5 Gram(s) IV Push once  dextrose 50% Injectable 25 Gram(s) IV Push once  finasteride 5 milliGRAM(s) Oral at bedtime  glucagon  Injectable 1 milliGRAM(s) IntraMuscular once  heparin  Infusion 1600 Unit(s)/Hr (16 mL/Hr) IV Continuous <Continuous>  insulin lispro (ADMELOG) corrective regimen sliding scale   SubCutaneous Before meals and at bedtime  polyethylene glycol 3350 17 Gram(s) Oral daily  senna 2 Tablet(s) Oral at bedtime  tamsulosin 0.4 milliGRAM(s) Oral at bedtime    MEDICATIONS  (PRN):  acetaminophen     Tablet .. 650 milliGRAM(s) Oral every 6 hours PRN Temp greater or equal to 38C (100.4F), Mild Pain (1 - 3)  aluminum hydroxide/magnesium hydroxide/simethicone Suspension 30 milliLiter(s) Oral every 4 hours PRN Dyspepsia  dextrose Oral Gel 15 Gram(s) Oral once PRN Blood Glucose LESS THAN 70 milliGRAM(s)/deciliter  melatonin 3 milliGRAM(s) Oral at bedtime PRN Insomnia  ondansetron Injectable 4 milliGRAM(s) IV Push every 8 hours PRN Nausea and/or Vomiting        FAMILY HISTORY:  No pertinent family history in first degree relatives        CBC Full  -  ( 25 Jan 2025 08:09 )  WBC Count : 6.18 K/uL  RBC Count : 4.72 M/uL  Hemoglobin : 13.1 g/dL  Hematocrit : 40.4 %  Platelet Count - Automated : 324 K/uL  Mean Cell Volume : 85.6 fl  Mean Cell Hemoglobin : 27.8 pg  Mean Cell Hemoglobin Concentration : 32.4 g/dL  Auto Neutrophil # : 3.72 K/uL  Auto Lymphocyte # : 1.73 K/uL  Auto Monocyte # : 0.58 K/uL  Auto Eosinophil # : 0.09 K/uL  Auto Basophil # : 0.03 K/uL  Auto Neutrophil % : 60.1 %  Auto Lymphocyte % : 28.0 %  Auto Monocyte % : 9.4 %  Auto Eosinophil % : 1.5 %  Auto Basophil % : 0.5 %      01-25    138  |  96  |  8   ----------------------------<  77  3.7   |  28  |  0.61    Ca    9.4      25 Jan 2025 08:09  Phos  3.9     01-25  Mg     1.8     01-25    TPro  7.2  /  Alb  3.6  /  TBili  0.4  /  DBili  x   /  AST  16  /  ALT  10  /  AlkPhos  67  01-25      Urinalysis Basic - ( 25 Jan 2025 08:09 )    Color: x / Appearance: x / SG: x / pH: x  Gluc: 77 mg/dL / Ketone: x  / Bili: x / Urobili: x   Blood: x / Protein: x / Nitrite: x   Leuk Esterase: x / RBC: x / WBC x   Sq Epi: x / Non Sq Epi: x / Bacteria: x        Radiology :     < from: MR Head w/wo IV Cont (01.12.25 @ 14:04) >    ACC: [id number] EXAM:  MR BRAIN WAW IC   ORDERED BY: CHRISTIANO SOL     PROCEDURE DATE:  01/12/2025          INTERPRETATION:  CLINICAL INDICATION: Worsening slow gait with falls,   cogwheel rigidity, and dysphagia    TECHNIQUE: Multi-planar multi-sequential MR imaging of the brain was   performed before and after the intravenous administration of contrast.   Gadavist IV contrast dose: 7 cc administered.    COMPARISON: CT brain 9/5/2024    FINDINGS:  No acute infarction, intracranial hemorrhage or mass lesion. Ventricular   and sulcal prominence is consistent with age-appropriate involutional   change. T2 FLAIR hyperintensities within bilateral cerebral hemispheres.   No abnormal parenchymal or leptomeningeal enhancement is identified.   There is normal intracranial vascular enhancement.      No hydrocephalus. No extra-axial fluid collections. The skull base flow   voids are present.    The visualized intraorbital contents are unremarkable. There has been   previous bilateral lens replacement surgery. The imaged portions of the   paranasal sinuses are clear. The mastoid air cells are clear. The   visualized osseous structures, soft tissues and partially visualized   parotid glands appear normal.    IMPRESSION:  Non enhancing T2 FLAIR hyperintensities within bilateral cerebral   hemispheres likely representing chronic ischemic changes. Atrophy.           Review of Systems : per HPI         Vital Signs Last 24 Hrs  T(C): 36.4 (25 Jan 2025 05:56), Max: 36.8 (24 Jan 2025 23:57)  T(F): 97.6 (25 Jan 2025 05:56), Max: 98.2 (24 Jan 2025 23:57)  HR: 90 (25 Jan 2025 05:56) (85 - 96)  BP: 129/76 (25 Jan 2025 05:56) (103/66 - 129/76)  BP(mean): --  RR: 18 (25 Jan 2025 05:56) (18 - 18)  SpO2: 99% (25 Jan 2025 05:56) (96% - 99%)    Parameters below as of 25 Jan 2025 05:56  Patient On (Oxygen Delivery Method): room air            Physical Exam:   79 y o man lying comfortably in semi Rock's position , awake , alert , no acute complaints      Head: normocephalic , atraumatic    Eyes: PERRLA , EOMI , no nystagmus , sclera anicteric    ENT / FACE: neg nasal discharge , uvula midline , no oropharyngeal erythema / exudate    Neck: supple , negative JVD , negative carotid bruits , no thyromegaly    Chest: CTA bilaterally     Cardiovascular: regular rate and rhythm , neg murmurs / rubs / gallops    Abdomen: soft , non distended , no tenderness to palpation in all 4 quadrants ,  normal bowel sounds     Extremities: WWP , neg cyanosis /clubbing / edema     Neurologic Exam:     Alert and oriented to person , place , date/year , speech fluent w/o dysarthria     Cranial Nerves:           II:                         pupils equal , round and reactive to light , visual fields intact         III/ IV/VI:             extraocular movements intact , neg nystagmus , neg ptosis        V:                        facial sensation intact , V1-3 normal        VII:                      face symmetric , no droop , normal eye closure and smile        VIII:                     hearing intact to finger rub bilaterally        IX and X:             no hoarseness , gag intact , palate/ uvula rise symmetrically        XI:                       SCM / trapezius strength intact bilateral        XII:                      no tongue deviation    Motor Exam:        > 3+/5 x 4 extremities , without drift     Sensation:         intact to light touch x 4 extremities                            no neglect or extinction on double simultaneous testing    DTR:           biceps/brachioradialis: equal                            patella/ankle: equal          neg Babinski     Coordination:            Finger to Nose:  neg dysmetria bilaterally        Gait:  not tested         PM&R Impression: admitted for progressive gait decline, found w/ RH strain on echo, admitted for LP and RHS eval    - h/p spinal stenosis, neuropathy        Recommendations / Plan:       1) Physical / Occupational therapy focusing on therapeutic exercises , equipment evaluation , bed mobility/transfer out of bed evaluation , progressive ambulation with assistive devices prn .    2) Current disposition plan recommendation:    pending functional progress

## 2025-01-25 NOTE — PHYSICAL THERAPY INITIAL EVALUATION ADULT - PERTINENT HX OF CURRENT PROBLEM, REHAB EVAL
Pt is a 80 yo male PMH HTN, CAD, DM2, BPH, peripheral neuropathy, spinal stenosis, PE w/ RHS (dx 10/2024, on Eliquis), ileus/colonic distention, presented per outpt neurologist (Dr. Haji) for subacute, progressive gait decline, found w/ RH strain on echo, admitted for LP and RHS eval.

## 2025-01-25 NOTE — PHYSICAL THERAPY INITIAL EVALUATION ADULT - SOCIAL CONCERNS
None
You can access the FollowMyHealth Patient Portal offered by [facility] by registering at the following website: [email or web address]. By joining [email or web address]’s FollowMyHealth portal, you will also be able to view your health information using other applications (apps) compatible with our system.

## 2025-01-25 NOTE — SWALLOW BEDSIDE ASSESSMENT ADULT - H & P REVIEW
79M w/PMH HTN, CAD, DM2, BPH, peripheral neuropathy, spinal stenosis, PE (dx 10/2024, on Eliquis), ileus/colonic distention, presented to the ED as per instructions of outpt neurologist, Dr. Haji, for a diagnostic lumbar puncture and management of possible autoimmune or paraneoplastic etiology of his subacute progressive parkinsonism that started around 12/2023./yes

## 2025-01-25 NOTE — PHYSICAL THERAPY INITIAL EVALUATION ADULT - GENERAL OBSERVATIONS, REHAB EVAL
Pt received semi supine in bed with +IV, NAD. Pt left as found, NAD, call bell in reach, +bed alarm. line intact.

## 2025-01-25 NOTE — PROGRESS NOTE ADULT - PROBLEM SELECTOR PLAN 2
Seen on admission TTE. W/ +hepatojugular reflex, and +4 LE pitting edema that has worsened in recent weeks. Will aim for euvolemia prior to considering repeat CTPE for AC failure / partially resolved embolus r/o. Pt w/o sx of PE (i.e. hypoxia, tachycardia) on admission..       - TTE 01/24: LVEF 55-60%, no regional wall motion abnormalities, abnormal septal motion due to RV overload. Mildly reduced RV function. RA dilation, normal LV size. PASP 49.     Plan:  - Give additional Lasix 40 IV x1 now  - Continue IV diuresis in AM  - Holding home Lasix 40 PO qd for IV diuretics  - q6h bladder scans  - Strict I/O, daily weights Seen on admission TTE. W/ +hepatojugular reflex, and +4 LE pitting edema that has worsened in recent weeks. Will aim for euvolemia prior to considering repeat CTPE for AC failure / partially resolved embolus r/o. Pt w/o sx of PE (i.e. hypoxia, tachycardia) on admission..       - TTE 01/24: LVEF 55-60%, no regional wall motion abnormalities, abnormal septal motion due to RV overload. Mildly reduced RV function. RA dilation, normal LV size. PASP 49.   sp 1x dose iv lasix in ed    Plan:  - continue home lasix 40mg po qd  - q6h bladder scans  - Strict I/O, daily weights

## 2025-01-25 NOTE — PROGRESS NOTE ADULT - PROBLEM SELECTOR PLAN 3
w/ RH strain. Dx 10/2024, likely provoked PE from immobilization and recent hospitalization. Also w/ +DVT study at that time. Compliant w/ Eliquis, last dose: 01/23        - Per outpt pulm notes, considered d/c Eliquis for fall risk    Plan:  - Start Heparin ggt for full AC  - Holding home Eliquis for LP on Monday  - Outpt pulm f/u (Dr. Watts)

## 2025-01-25 NOTE — PATIENT PROFILE ADULT - FALL HARM RISK - FACTORS NURSING JUDGEMENT
Detail Level: Zone
Plan: Recheck blood work one week prior to follow up in 3 months.
Continue Regimen: Methotrexate 2.5 mg - take 8 pills on one day each week \\nFolic Acid - take 2 pills once daily
Yes

## 2025-01-25 NOTE — SWALLOW BEDSIDE ASSESSMENT ADULT - SWALLOW EVAL: DIAGNOSIS
Overt clinical s/s of penetration/aspiration following all thin liquid trials given immediate post-swallow coughing and change in vocal quality (wet/gurgly). No overt clinical s/s of penetration/aspiration following puree and/or regular solids. Given clinical concern for pharyngeal dysphagia, as well as dysphagia risk factors including underlying medical dx, progressive weakness and self-stated c/o dysphagia to liquids,, recommend MBS to further assess oropharyngeal swallow mechanism and further guide dysphagia management plan. In the interim, recommend puree diet with NO liquids with alternate hydration plan as medical team deems appropriate. Maintain aspiration precautions.

## 2025-01-25 NOTE — PHYSICAL THERAPY INITIAL EVALUATION ADULT - ADDITIONAL COMMENTS
Pt lives with spouse in an apt with elevator. Prior to admission, pt ambulated with rolling walker indoor, outdoor with wheelchair. Pt has bath tub with shower bench and grab bar.

## 2025-01-25 NOTE — PHYSICAL THERAPY INITIAL EVALUATION ADULT - IMPAIRMENTS CONTRIBUTING TO GAIT DEVIATIONS, PT EVAL
Pt unable to ambulate further distance secondary pt complains fatigue./impaired balance/impaired postural control/decreased strength

## 2025-01-25 NOTE — PROGRESS NOTE ADULT - PROBLEM SELECTOR PLAN 1
Likely 2/2 Parkinson's, given bradykinesia, hypomimia, and cogwheel rigidity. C/f concurrent AI process given uncharacteristically rapid decline over past 1y. Failed bedside LP in ED, will pursue IR-guided LP to r/o AI vs paraneoplastic process.     Plan:   - Plan for IR-guided LP on Monday (01/17)       - Holding Eliquis for procedure (last dose: 01/23)       - Heparin ggt for full AC until procedure  - Resume home Carbidopa-Levodopa 25-100mg TID  - Neuro on board  - SLP on board given recent dysphagia w/ solids and liquids

## 2025-01-25 NOTE — PROGRESS NOTE ADULT - SUBJECTIVE AND OBJECTIVE BOX
**INCOMPLETE NOTE    INTERVAL/OVERNIGHT EVENTS: None    SUBJECTIVE:  Patient seen and examined at bedside, comfortable, NAD. Denied fever, chest pain, dyspnea, abdominal pain.     Vital Signs Last 12 Hrs  T(F): 97.2 (01-25-25 @ 08:30), Max: 98.2 (01-24-25 @ 23:57)  HR: 84 (01-25-25 @ 08:30) (84 - 90)  BP: 118/74 (01-25-25 @ 08:30) (110/70 - 129/76)  BP(mean): --  RR: 16 (01-25-25 @ 08:30) (16 - 18)  SpO2: 98% (01-25-25 @ 08:30) (96% - 99%)  I&O's Summary      PHYSICAL EXAM:  General: NAD  HEENT: PERRL, EOM intact, sclera anicteric, MMM  Cardiovascular: RRR; no MRG;   Respiratory: CTAB; no WRR  GI/: soft; NTND; BS x4  Extremities: WWP; 2+ peripheral pulses bilaterally; no LE edema  Skin: normal color & turgor; no rash  Neurologic: aox3; no focal deficits    LABS:                        13.1   6.18  )-----------( 324      ( 25 Jan 2025 08:09 )             40.4     01-25    138  |  96  |  8   ----------------------------<  77  3.7   |  28  |  0.61    Ca    9.4      25 Jan 2025 08:09  Phos  3.9     01-25  Mg     1.8     01-25    TPro  7.2  /  Alb  3.6  /  TBili  0.4  /  DBili  x   /  AST  16  /  ALT  10  /  AlkPhos  67  01-25    PT/INR - ( 24 Jan 2025 08:52 )   PT: 14.0 sec;   INR: 1.22          PTT - ( 25 Jan 2025 08:09 )  PTT:51.1 sec  Urinalysis Basic - ( 25 Jan 2025 08:09 )    Color: x / Appearance: x / SG: x / pH: x  Gluc: 77 mg/dL / Ketone: x  / Bili: x / Urobili: x   Blood: x / Protein: x / Nitrite: x   Leuk Esterase: x / RBC: x / WBC x   Sq Epi: x / Non Sq Epi: x / Bacteria: x          RADIOLOGY & ADDITIONAL TESTS:    MEDICATIONS  (STANDING):  aspirin  chewable 81 milliGRAM(s) Oral daily  carbidopa/levodopa  25/100 1 Tablet(s) Oral every 8 hours  dextrose 5%. 1000 milliLiter(s) (100 mL/Hr) IV Continuous <Continuous>  dextrose 5%. 1000 milliLiter(s) (50 mL/Hr) IV Continuous <Continuous>  dextrose 50% Injectable 25 Gram(s) IV Push once  dextrose 50% Injectable 12.5 Gram(s) IV Push once  dextrose 50% Injectable 25 Gram(s) IV Push once  finasteride 5 milliGRAM(s) Oral at bedtime  glucagon  Injectable 1 milliGRAM(s) IntraMuscular once  heparin  Infusion 1600 Unit(s)/Hr (16 mL/Hr) IV Continuous <Continuous>  insulin lispro (ADMELOG) corrective regimen sliding scale   SubCutaneous Before meals and at bedtime  polyethylene glycol 3350 17 Gram(s) Oral daily  senna 2 Tablet(s) Oral at bedtime  tamsulosin 0.4 milliGRAM(s) Oral at bedtime    MEDICATIONS  (PRN):  acetaminophen     Tablet .. 650 milliGRAM(s) Oral every 6 hours PRN Temp greater or equal to 38C (100.4F), Mild Pain (1 - 3)  aluminum hydroxide/magnesium hydroxide/simethicone Suspension 30 milliLiter(s) Oral every 4 hours PRN Dyspepsia  dextrose Oral Gel 15 Gram(s) Oral once PRN Blood Glucose LESS THAN 70 milliGRAM(s)/deciliter  melatonin 3 milliGRAM(s) Oral at bedtime PRN Insomnia  ondansetron Injectable 4 milliGRAM(s) IV Push every 8 hours PRN Nausea and/or Vomiting   INTERVAL/OVERNIGHT EVENTS: pureed diet per s&s assessment early am, plan for mbs monday    SUBJECTIVE:  Patient seen and examined at bedside, comfortable, NAD. Denied fever, chest pain, dyspnea, abdominal pain.     Vital Signs Last 12 Hrs  T(F): 97.2 (01-25-25 @ 08:30), Max: 98.2 (01-24-25 @ 23:57)  HR: 84 (01-25-25 @ 08:30) (84 - 90)  BP: 118/74 (01-25-25 @ 08:30) (110/70 - 129/76)  BP(mean): --  RR: 16 (01-25-25 @ 08:30) (16 - 18)  SpO2: 98% (01-25-25 @ 08:30) (96% - 99%)  I&O's Summary      PHYSICAL EXAM:  General: NAD  HEENT: PERRL, EOM intact, sclera anicteric, MMM  Cardiovascular: RRR; no MRG;   Respiratory: CTAB; no WRR  GI/: soft; NTND; BS x4  Extremities: WWP; 2+ peripheral pulses bilaterally; bilateral les +pitting edema  Skin: normal color & turgor; no rash  Neurologic: aox3; bilateral upper & lower extremities w reduced strength, vibratory sensation reduced in rle, unable to test gait (pt unable to ambulate), +cogwheel rigidity, hypomimia    LABS:                        13.1   6.18  )-----------( 324      ( 25 Jan 2025 08:09 )             40.4     01-25    138  |  96  |  8   ----------------------------<  77  3.7   |  28  |  0.61    Ca    9.4      25 Jan 2025 08:09  Phos  3.9     01-25  Mg     1.8     01-25    TPro  7.2  /  Alb  3.6  /  TBili  0.4  /  DBili  x   /  AST  16  /  ALT  10  /  AlkPhos  67  01-25    PT/INR - ( 24 Jan 2025 08:52 )   PT: 14.0 sec;   INR: 1.22          PTT - ( 25 Jan 2025 08:09 )  PTT:51.1 sec  Urinalysis Basic - ( 25 Jan 2025 08:09 )    Color: x / Appearance: x / SG: x / pH: x  Gluc: 77 mg/dL / Ketone: x  / Bili: x / Urobili: x   Blood: x / Protein: x / Nitrite: x   Leuk Esterase: x / RBC: x / WBC x   Sq Epi: x / Non Sq Epi: x / Bacteria: x          RADIOLOGY & ADDITIONAL TESTS:    MEDICATIONS  (STANDING):  aspirin  chewable 81 milliGRAM(s) Oral daily  carbidopa/levodopa  25/100 1 Tablet(s) Oral every 8 hours  dextrose 5%. 1000 milliLiter(s) (100 mL/Hr) IV Continuous <Continuous>  dextrose 5%. 1000 milliLiter(s) (50 mL/Hr) IV Continuous <Continuous>  dextrose 50% Injectable 25 Gram(s) IV Push once  dextrose 50% Injectable 12.5 Gram(s) IV Push once  dextrose 50% Injectable 25 Gram(s) IV Push once  finasteride 5 milliGRAM(s) Oral at bedtime  glucagon  Injectable 1 milliGRAM(s) IntraMuscular once  heparin  Infusion 1600 Unit(s)/Hr (16 mL/Hr) IV Continuous <Continuous>  insulin lispro (ADMELOG) corrective regimen sliding scale   SubCutaneous Before meals and at bedtime  polyethylene glycol 3350 17 Gram(s) Oral daily  senna 2 Tablet(s) Oral at bedtime  tamsulosin 0.4 milliGRAM(s) Oral at bedtime    MEDICATIONS  (PRN):  acetaminophen     Tablet .. 650 milliGRAM(s) Oral every 6 hours PRN Temp greater or equal to 38C (100.4F), Mild Pain (1 - 3)  aluminum hydroxide/magnesium hydroxide/simethicone Suspension 30 milliLiter(s) Oral every 4 hours PRN Dyspepsia  dextrose Oral Gel 15 Gram(s) Oral once PRN Blood Glucose LESS THAN 70 milliGRAM(s)/deciliter  melatonin 3 milliGRAM(s) Oral at bedtime PRN Insomnia  ondansetron Injectable 4 milliGRAM(s) IV Push every 8 hours PRN Nausea and/or Vomiting

## 2025-01-25 NOTE — SWALLOW BEDSIDE ASSESSMENT ADULT - SWALLOW EVAL: ORAL MUSCULATURE
No gross facial asymmetry. Edentulous upper. Scattered natural lower dentition. Moist oral mucosa. Lingual protrusion midline. Lingual/labial ROM WNL. Adequate articulatory precision. Adequate secretion management. Vocal quality WNL.

## 2025-01-25 NOTE — SWALLOW BEDSIDE ASSESSMENT ADULT - SLP GENERAL OBSERVATIONS
Pt received awake and alert. Aox2 (-time). Room air. Responding appropriately to questions. Endorses coughing with liquids for several weeks. Denies difficulties with solids. Unclear pt's level of reliability. Pt amenable to participation in assessment.

## 2025-01-25 NOTE — SWALLOW BEDSIDE ASSESSMENT ADULT - NS SPL SWALLOW CLINIC TRIAL FT
Administered PO trials of ice chip x1, thin liquids via straw (2oz sequential sips, 2 single sips), 4oz puree via tsp, soft & bite sized x2. Benefited from feeding assistance. ADequate acceptance. Adequate labial seal/stripping of utensil. Prolonged, functional mastication of soft & bite sized solid texture. Suspect reduced AP transit/oral clearance given mild lingual stasis following soft & bite sized solid texture, only cleared with cued liquid wash. Hyolaryngeal elevation/excursion visualized for each bite/sip. Overt clinical s/s of penetration/aspiration following all thin liquid trials given immediate post-swallow coughing and change in vocal quality (wet/gurgly). No overt clinical s/s of penetration/aspiration following puree and/or soft & bite sized solids. Unable to rule out aspiration bedside.

## 2025-01-25 NOTE — CONSULT NOTE ADULT - ASSESSMENT
{\rtf1\ouatev28088\ansi\avppslu5580\ftnbj\uc1\deff0  {\fonttbl{\f0 \fnil Segoe UI;}{\f1 \fnil \fcharset0 Segoe UI;}{\f2 \fnil Times New Saran;}}  {\colortbl ;\qfi849\qqefe663\qzgd300 ;\red0\green0\blue0 ;\red0\green0\snsc897 ;\red0\green0\blue0 ;}  {\stylesheet{\f0\fs20 Normal;}{\cs1 Default Paragraph Font;}{\cs2\f0\fs16 Line Number;}{\cs3\f2\fs24\ul\cf3 Hyperlink;}}  {\*\revtbl{Unknown;}}  \sfunet35398\xlklyk51188\afvwd8508\lrdjh4672\jhydv0861\sajpo0176\qscdxcv180\ezukifb113\nogrowautofit\abstto706\formshade\nofeaturethrottle1\dntblnsbdb\fet4\aendnotes\aftnnrlc\pgbrdrhead\pgbrdrfoot  \sectd\zjbkno70594\bbpygl80492\guttersxn0\aahgjmmu6731\gmcqrzfi6185\xcogbsro8013\rxxkajnm5864\owtvkzx596\ebeqgag003\sbkpage\pgncont\pgndec  \plain\plain\f0\fs24\ql\plain\f0\fs24\plain\f0\fs20\vmnt9086\hich\f0\dbch\f0\loch\f0\fs20\par  I M\par  \par  79 y o M PMH HTN, CAD, DM2, BPH, peripheral neuropathy, spinal stenosis, PE w/ RHS (dx 10/2024, on Eliquis), ileus/colonic distention, presented per outpt neurologist (Dr. Haji) for subacute, progressive gait decline, found w/ RH strain on echo, admitted   for LP and RHS eval. \par  \par  \plain\f1\fs20\qnte1982\hich\f1\dbch\f1\loch\f1\cf2\fs20\ul{\field{\*\fldinst HYPERLINK [id number] }{\fldrslt Problem/Plan - 1:}}\plain\f0\fs20\oqbl1506\hich\f0\dbch\f0\loch\f0\fs20\ql\par  \'b7  {\*\bkmkstart [id number]}{\*\bkmkend xj29737915143}Problem: {\*\bkmkstart kz52208017180}{\*\bkmkend sf29725726535}Unstable gait. \par  \'b7  {\*\bkmkstart gx38994137423}{\*\bkmkend hi69008460113}Plan: {\*\bkmkstart iy31602977887}{\*\bkmkend cp33385280719}Likely 2/2 Parkinson's, given bradykinesia, hypomimia, and cogwheel rigidity. C/f concurrent AI process given uncharacteristically   rapid decline over past 1y. Failed bedside LP in ED, will pursue IR-guided LP to r/o AI vs paraneoplastic process. \par  \par  Plan: \par  - Plan for IR-guided LP on Monday (01/17)\par       - Holding Eliquis for procedure (last dose: 01/23)\par       - Heparin ggt for full AC until procedure\par  - Resume home Carbidopa-Levodopa 25-100mg TID\par  - Neuro on board\par  - SLP on board given recent dysphagia w/ solids and liquids.\plain\f1\fs20\ztoq7340\hich\f1\dbch\f1\loch\f1\cf2\fs20\strike\plain\f0\fs20\angn0887\hich\f0\dbch\f0\loch\f0\fs20\par  \par  \plain\f1\fs20\wgvu1517\hich\f1\dbch\f1\loch\f1\cf2\fs20\ul{\field{\*\fldinst HYPERLINK [id number] }{\fldrslt Problem/Plan - 2:}}\plain\f0\fs20\qrup9677\hich\f0\dbch\f0\loch\f0\fs20\ql\par  \'b7  {\*\bkmkstart eb25390564469}{\*\bkmkend hf01910507829}Problem: {\*\bkmkstart md12189957551}{\*\bkmkend vb55671566653}Right-sided heart failure. \par  \'b7  {\*\bkmkstart vd20655829148}{\*\bkmkend he40797636778}Plan: {\*\bkmkstart mg48870342265}{\*\bkmkend pc33684148209}Seen on admission TTE. W/ +hepatojugular reflex, and +4 LE pitting edema that has worsened in recent weeks. Will aim for euvolemia   prior to considering repeat CTPE for AC failure / partially resolved embolus r/o. Pt w/o sx of PE (i.e. hypoxia, tachycardia) on admission..\par       - TTE 01/24: LVEF 55-60%, no regional wall motion abnormalities, abnormal septal motion due to RV overload. Mildly reduced RV function. RA dilation, normal LV size. PASP 49. \par  \par  Plan:\par  - Give additional Lasix 40 IV x1 now\par  - Continue IV diuresis in AM\par  - Holding home Lasix 40 PO qd for IV diuretics\par  - q6h bladder scans\par  - Strict I/O, daily weights.\plain\f1\fs20\hdzb1596\hich\f1\dbch\f1\loch\f1\cf2\fs20\strike\plain\f0\fs20\fgau1778\hich\f0\dbch\f0\loch\f0\fs20\par  \par  \plain\f1\fs20\ujfa4004\hich\f1\dbch\f1\loch\f1\cf2\fs20\ul{\field{\*\fldinst HYPERLINK [id number] }{\fldrslt Problem/Plan - 3:}}\plain\f0\fs20\fsew7765\hich\f0\dbch\f0\loch\f0\fs20\ql\par  \'b7  {\*\bkmkstart ku10764387910}{\*\bkmkend jx14904568826}Problem: {\*\bkmkstart pm00139298388}{\*\bkmkend xe15568971745}History of pulmonary embolism. \par  \'b7  {\*\bkmkstart ft23153109107}{\*\bkmkend on48418609194}Plan: {\*\bkmkstart xm32358693539}{\*\bkmkend dq53241843932}w/ RH strain. Dx 10/2024, likely provoked PE from immobilization and recent hospitalization. Also w/ +DVT study at that time. Compliant   w/ Eliquis, last dose: 01/23 \par       - Per outpt pulm notes, considered d/c Eliquis for fall risk\par  \par  Plan:\par  - Start Heparin ggt for full AC\par  - Holding home Eliquis for LP on Monday\par  - Outpt pulm f/u (Dr. Watts).\plain\f1\fs20\rabg7082\hich\f1\dbch\f1\loch\f1\cf2\fs20\strike\plain\f0\fs20\sgdk3823\hich\f0\dbch\f0\loch\f0\fs20\par  \par  \plain\f1\fs20\gonx0358\hich\f1\dbch\f1\loch\f1\cf2\fs20\ul{\field{\*\fldinst HYPERLINK [id number] }{\fldrslt Problem/Plan - 4:}}\plain\f0\fs20\popp4187\hich\f0\dbch\f0\loch\f0\fs20\ql\par  \'b7  {\*\bkmkstart hz70355535764}{\*\bkmkend ir77343859270}Problem: {\*\bkmkstart fb93128181249}{\*\bkmkend oo02097457876}History of CAD (coronary artery disease). \par  \'b7  {\*\bkmkstart be25171253957}{\*\bkmkend xy18329524261}Plan: {\*\bkmkstart hp01223849909}{\*\bkmkend db62046363260}Plan:\par  - C/w home ASA\par  - Holding home Otezla 30mg qd (non-form).\par  \par  \plain\f1\fs20\zjfe7458\hich\f1\dbch\f1\loch\f1\cf2\fs20\ul{\field{\*\fldinst HYPERLINK [id number] }{\fldrslt Problem/Plan - 5:}}\plain\f0\fs20\pzen0883\hich\f0\dbch\f0\loch\f0\fs20\ql\par  \'b7  {\*\bkmkstart fr37920779991}{\*\bkmkend cu80023418797}Problem: {\*\bkmkstart cq84634541423}{\*\bkmkend tw12934225354}History of BPH. \par  \'b7  {\*\bkmkstart ix45300126914}{\*\bkmkend ts10516082866}Plan: {\*\bkmkstart nx11944746499}{\*\bkmkend io58225357770}Plan:\par  - Resume home tamsulosin 0.4mg qHS\par  - Resume home finasteride 5mg qHS.\par  \par  \plain\f1\fs20\ckpd2163\hich\f1\dbch\f1\loch\f1\cf2\fs20\ul{\field{\*\fldinst HYPERLINK [id number] }{\fldrslt Problem/Plan - 6:}}\plain\f0\fs20\figm9104\hich\f0\dbch\f0\loch\f0\fs20\ql\par  \'b7  {\*\bkmkstart qx91473442232}{\*\bkmkend lp42909222879}Problem: {\*\bkmkstart yw04390419890}{\*\bkmkend od77063959597}Type II diabetes mellitus. \par  \'b7  {\*\bkmkstart ko77009196643}{\*\bkmkend wu46932414105}Plan: {\*\bkmkstart fl87729184415}{\*\bkmkend qu64773894889}Plan:\par  - C/w mISS\par  - F/u A1c\par  - Consistent carb diet \par  - Holding home metformin 500mg qd\par  - Holding home Ozempic.\par  \par  \plain\f1\fs20\irpy4692\hich\f1\dbch\f1\loch\f1\cf2\fs20\ul{\field{\*\fldinst HYPERLINK [id number] }{\fldrslt Problem/Plan - 7:}}\plain\f0\fs20\ppav6285\hich\f0\dbch\f0\loch\f0\fs20\ql\par  \'b7  {\*\bkmkstart yu13193355188}{\*\bkmkend vu09096083675}Problem: {\*\bkmkstart xn41718487232}{\*\bkmkend wj56051424217}Chronic venous stasis. \par  \'b7  {\*\bkmkstart nh38687004454}{\*\bkmkend tl79759464951}Plan: {\*\bkmkstart qi87469493918}{\*\bkmkend wj74700817414}Plan:\par  - C/w compression stockings\par  - Elevate legs\par  - Diuretics as above.\par  \par  \plain\f1\fs20\wnzb0896\hich\f1\dbch\f1\loch\f1\cf2\fs20\ul{\field{\*\fldinst HYPERLINK [id number] }{\fldrslt Problem/Plan - 8:}}\plain\f0\fs20\jwvf0860\hich\f0\dbch\f0\loch\f0\fs20\ql\par  \'b7  {\*\bkmkstart wx86175034760}{\*\bkmkend yy89184753137}Problem: {\*\bkmkstart mg88905772846}{\*\bkmkend fc43329677328}Colonic pseudoobstruction. \par  \'b7  {\*\bkmkstart tt47036290114}{\*\bkmkend bs41139832634}Plan: {\*\bkmkstart nb02035130397}{\*\bkmkend cp37365360666}Plan:\par  - Start Senna and Miralax\par  - Routine abd exams\par  - Ensure pt is passing gas, monitor BMs.\par  \par  \plain\f1\fs20\nnil8668\hich\f1\dbch\f1\loch\f1\cf2\fs20\ul{\field{\*\fldinst HYPERLINK [id number] }{\fldrslt Problem/Plan - 9:}}\plain\f0\fs20\xxbp5801\hich\f0\dbch\f0\loch\f0\fs20\ql\par  \'b7  {\*\bkmkstart td98077782438}{\*\bkmkend ee78381090894}Problem: {\*\bkmkstart pe63691369783}{\*\bkmkend wk91285745232}Prophylactic measure. \par  \'b7  {\*\bkmkstart pb79693790817}{\*\bkmkend pb61622650953}Plan: {\*\bkmkstart fg16397434499}{\*\bkmkend bh11607688001}F: ---\par  E: Maintain K >4, Mg >2\par  N: CC soft/bite sized\par  VTE ppx: Heparin (full AC)\par  Dispo: RMF.\par  \par  }  
Patient is a 78 yo M w/w/PMH HTN, CAD, DM2, BPH, peripheral neuropathy, spinal stenosis, and PE (dx 10/2024, on Eliquis) presenting for subacute, progressive parkinsonism with increasing falls and worsening gait, with exam notable for asymmetric bradykinesia, decrement of movement, and hypomimia, with outpatient Mine Scan showing no activity in bilateral striata, now admitted for diagnostic lumbar puncture to assess for possible autoimmune or paraneoplastic etiology for parkinsonism. LP was attempted twice at bedside with dry taps     Recommendations:  - Consult IR for fluoroscopy guided lumbar puncture with CSF protein, glucose, cell count, paraneoplastic panel, encephalopathy panel, oligoclonal bands, myelin basic protein, flow cytometry, and non-gyn cytopathology    Discussed case with attending Dr. Mcgowan

## 2025-01-25 NOTE — PATIENT PROFILE ADULT - FALL HARM RISK - HARM RISK INTERVENTIONS

## 2025-01-26 LAB
ANION GAP SERPL CALC-SCNC: 10 MMOL/L — SIGNIFICANT CHANGE UP (ref 5–17)
APTT BLD: 93.1 SEC — HIGH (ref 24.5–35.6)
BASOPHILS # BLD AUTO: 0.03 K/UL — SIGNIFICANT CHANGE UP (ref 0–0.2)
BASOPHILS NFR BLD AUTO: 0.5 % — SIGNIFICANT CHANGE UP (ref 0–2)
BUN SERPL-MCNC: 8 MG/DL — SIGNIFICANT CHANGE UP (ref 7–23)
CALCIUM SERPL-MCNC: 9.3 MG/DL — SIGNIFICANT CHANGE UP (ref 8.4–10.5)
CHLORIDE SERPL-SCNC: 103 MMOL/L — SIGNIFICANT CHANGE UP (ref 96–108)
CO2 SERPL-SCNC: 28 MMOL/L — SIGNIFICANT CHANGE UP (ref 22–31)
CREAT SERPL-MCNC: 0.54 MG/DL — SIGNIFICANT CHANGE UP (ref 0.5–1.3)
EGFR: 103 ML/MIN/1.73M2 — SIGNIFICANT CHANGE UP
EOSINOPHIL # BLD AUTO: 0.11 K/UL — SIGNIFICANT CHANGE UP (ref 0–0.5)
EOSINOPHIL NFR BLD AUTO: 1.8 % — SIGNIFICANT CHANGE UP (ref 0–6)
GLUCOSE SERPL-MCNC: 101 MG/DL — HIGH (ref 70–99)
HCT VFR BLD CALC: 40.3 % — SIGNIFICANT CHANGE UP (ref 39–50)
HGB BLD-MCNC: 12.4 G/DL — LOW (ref 13–17)
IMM GRANULOCYTES NFR BLD AUTO: 0.3 % — SIGNIFICANT CHANGE UP (ref 0–0.9)
LYMPHOCYTES # BLD AUTO: 1.5 K/UL — SIGNIFICANT CHANGE UP (ref 1–3.3)
LYMPHOCYTES # BLD AUTO: 24.4 % — SIGNIFICANT CHANGE UP (ref 13–44)
MAGNESIUM SERPL-MCNC: 2 MG/DL — SIGNIFICANT CHANGE UP (ref 1.6–2.6)
MCHC RBC-ENTMCNC: 26.7 PG — LOW (ref 27–34)
MCHC RBC-ENTMCNC: 30.8 G/DL — LOW (ref 32–36)
MCV RBC AUTO: 86.7 FL — SIGNIFICANT CHANGE UP (ref 80–100)
MONOCYTES # BLD AUTO: 0.6 K/UL — SIGNIFICANT CHANGE UP (ref 0–0.9)
MONOCYTES NFR BLD AUTO: 9.8 % — SIGNIFICANT CHANGE UP (ref 2–14)
NEUTROPHILS # BLD AUTO: 3.89 K/UL — SIGNIFICANT CHANGE UP (ref 1.8–7.4)
NEUTROPHILS NFR BLD AUTO: 63.2 % — SIGNIFICANT CHANGE UP (ref 43–77)
NRBC # BLD: 0 /100 WBCS — SIGNIFICANT CHANGE UP (ref 0–0)
NRBC BLD-RTO: 0 /100 WBCS — SIGNIFICANT CHANGE UP (ref 0–0)
PHOSPHATE SERPL-MCNC: 3.8 MG/DL — SIGNIFICANT CHANGE UP (ref 2.5–4.5)
PLATELET # BLD AUTO: 374 K/UL — SIGNIFICANT CHANGE UP (ref 150–400)
POTASSIUM SERPL-MCNC: 3.5 MMOL/L — SIGNIFICANT CHANGE UP (ref 3.5–5.3)
POTASSIUM SERPL-SCNC: 3.5 MMOL/L — SIGNIFICANT CHANGE UP (ref 3.5–5.3)
RBC # BLD: 4.65 M/UL — SIGNIFICANT CHANGE UP (ref 4.2–5.8)
RBC # FLD: 14.4 % — SIGNIFICANT CHANGE UP (ref 10.3–14.5)
SODIUM SERPL-SCNC: 141 MMOL/L — SIGNIFICANT CHANGE UP (ref 135–145)
WBC # BLD: 6.15 K/UL — SIGNIFICANT CHANGE UP (ref 3.8–10.5)
WBC # FLD AUTO: 6.15 K/UL — SIGNIFICANT CHANGE UP (ref 3.8–10.5)

## 2025-01-26 PROCEDURE — 99232 SBSQ HOSP IP/OBS MODERATE 35: CPT | Mod: GC

## 2025-01-26 RX ORDER — POTASSIUM CHLORIDE 750 MG/1
40 TABLET, EXTENDED RELEASE ORAL ONCE
Refills: 0 | Status: COMPLETED | OUTPATIENT
Start: 2025-01-26 | End: 2025-01-26

## 2025-01-26 RX ADMIN — Medication 40 MILLIGRAM(S): at 07:02

## 2025-01-26 RX ADMIN — Medication 2 TABLET(S): at 21:51

## 2025-01-26 RX ADMIN — Medication 1 TABLET(S): at 07:02

## 2025-01-26 RX ADMIN — Medication 1 TABLET(S): at 13:09

## 2025-01-26 RX ADMIN — POTASSIUM CHLORIDE 40 MILLIEQUIVALENT(S): 750 TABLET, EXTENDED RELEASE ORAL at 17:35

## 2025-01-26 RX ADMIN — Medication 5 MILLIGRAM(S): at 21:51

## 2025-01-26 RX ADMIN — POLYETHYLENE GLYCOL 3350 17 GRAM(S): 17 POWDER, FOR SOLUTION ORAL at 11:25

## 2025-01-26 RX ADMIN — ASPIRIN 81 MILLIGRAM(S): 81 TABLET, COATED ORAL at 11:25

## 2025-01-26 RX ADMIN — Medication 1 TABLET(S): at 21:51

## 2025-01-26 RX ADMIN — TAMSULOSIN HYDROCHLORIDE 0.4 MILLIGRAM(S): 0.4 CAPSULE ORAL at 21:51

## 2025-01-26 RX ADMIN — Medication 16 UNIT(S)/HR: at 11:31

## 2025-01-26 NOTE — OCCUPATIONAL THERAPY INITIAL EVALUATION ADULT - PHYSICAL ASSIST/NONPHYSICAL ASSIST: EATING, OT EVAL
Subjective:       Patient ID: Divya Soto is a 20 y.o. female.    Chief Complaint: No chief complaint on file.    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 then metronidazole 500 mg b.i.d. for 6 days   verbal cues/nonverbal cues (demo/gestures)/1 person assist

## 2025-01-26 NOTE — OCCUPATIONAL THERAPY INITIAL EVALUATION ADULT - GENERAL OBSERVATIONS, REHAB EVAL
Pt received semi-supine in bed +IV, in NAD and agreeable to OT. Cleared by MARTÍNEZ Serrano to see pt.

## 2025-01-26 NOTE — OCCUPATIONAL THERAPY INITIAL EVALUATION ADULT - ADDITIONAL COMMENTS
Pt lives in an apt with his wife with no ALVINA. Pt uses a RW inside the home and WC outside the home for functional mobility. Pt has a tub shower, shower chair and grab bars.

## 2025-01-26 NOTE — PROGRESS NOTE ADULT - PROBLEM SELECTOR PLAN 2
Seen on admission TTE. W/ +hepatojugular reflex, and +4 LE pitting edema that has worsened in recent weeks. Will aim for euvolemia prior to considering repeat CTPE for AC failure / partially resolved embolus r/o. Pt w/o sx of PE (i.e. hypoxia, tachycardia) on admission..       - TTE 01/24: LVEF 55-60%, no regional wall motion abnormalities, abnormal septal motion due to RV overload. Mildly reduced RV function. RA dilation, normal LV size. PASP 49.   sp 1x dose iv lasix in ed    Plan:  - continue home lasix 40mg po qd  - q6h bladder scans  - Strict I/O, daily weights

## 2025-01-26 NOTE — OCCUPATIONAL THERAPY INITIAL EVALUATION ADULT - PLANNED THERAPY INTERVENTIONS, OT EVAL
Monthly or less
ADL retraining/IADL retraining/balance training/bed mobility training/strengthening/transfer training

## 2025-01-26 NOTE — OCCUPATIONAL THERAPY INITIAL EVALUATION ADULT - MODIFIED CLINICAL TEST OF SENSORY INTEGRATION IN BALANCE TEST
Pt performed STS with modA +RW, pt took ~4 steps along the EOB with Kacy +RW, pt with BUE tremors throughout.

## 2025-01-26 NOTE — PROGRESS NOTE ADULT - SUBJECTIVE AND OBJECTIVE BOX
INTERVAL/OVERNIGHT EVENTS: pureed diet per s&s assessment early am, plan for mbs monday    SUBJECTIVE:  Patient seen and examined at bedside, comfortable, NAD. Denied fever, chest pain, dyspnea, abdominal pain.     Vital Signs Last 12 Hrs  T(F): 97.2 (01-25-25 @ 08:30), Max: 98.2 (01-24-25 @ 23:57)  HR: 84 (01-25-25 @ 08:30) (84 - 90)  BP: 118/74 (01-25-25 @ 08:30) (110/70 - 129/76)  BP(mean): --  RR: 16 (01-25-25 @ 08:30) (16 - 18)  SpO2: 98% (01-25-25 @ 08:30) (96% - 99%)  I&O's Summary      PHYSICAL EXAM:  General: NAD  HEENT: PERRL, EOM intact, sclera anicteric, MMM  Cardiovascular: RRR; no MRG;   Respiratory: CTAB; no WRR  GI/: soft; NTND; BS x4  Extremities: WWP; 2+ peripheral pulses bilaterally; bilateral les +pitting edema  Skin: normal color & turgor; no rash  Neurologic: aox3; bilateral upper & lower extremities w reduced strength, vibratory sensation reduced in rle, unable to test gait (pt unable to ambulate), +cogwheel rigidity, hypomimia    LABS:                        13.1   6.18  )-----------( 324      ( 25 Jan 2025 08:09 )             40.4     01-25    138  |  96  |  8   ----------------------------<  77  3.7   |  28  |  0.61    Ca    9.4      25 Jan 2025 08:09  Phos  3.9     01-25  Mg     1.8     01-25    TPro  7.2  /  Alb  3.6  /  TBili  0.4  /  DBili  x   /  AST  16  /  ALT  10  /  AlkPhos  67  01-25    PT/INR - ( 24 Jan 2025 08:52 )   PT: 14.0 sec;   INR: 1.22          PTT - ( 25 Jan 2025 08:09 )  PTT:51.1 sec  Urinalysis Basic - ( 25 Jan 2025 08:09 )    Color: x / Appearance: x / SG: x / pH: x  Gluc: 77 mg/dL / Ketone: x  / Bili: x / Urobili: x   Blood: x / Protein: x / Nitrite: x   Leuk Esterase: x / RBC: x / WBC x   Sq Epi: x / Non Sq Epi: x / Bacteria: x          RADIOLOGY & ADDITIONAL TESTS:    MEDICATIONS  (STANDING):  aspirin  chewable 81 milliGRAM(s) Oral daily  carbidopa/levodopa  25/100 1 Tablet(s) Oral every 8 hours  dextrose 5%. 1000 milliLiter(s) (100 mL/Hr) IV Continuous <Continuous>  dextrose 5%. 1000 milliLiter(s) (50 mL/Hr) IV Continuous <Continuous>  dextrose 50% Injectable 25 Gram(s) IV Push once  dextrose 50% Injectable 12.5 Gram(s) IV Push once  dextrose 50% Injectable 25 Gram(s) IV Push once  finasteride 5 milliGRAM(s) Oral at bedtime  glucagon  Injectable 1 milliGRAM(s) IntraMuscular once  heparin  Infusion 1600 Unit(s)/Hr (16 mL/Hr) IV Continuous <Continuous>  insulin lispro (ADMELOG) corrective regimen sliding scale   SubCutaneous Before meals and at bedtime  polyethylene glycol 3350 17 Gram(s) Oral daily  senna 2 Tablet(s) Oral at bedtime  tamsulosin 0.4 milliGRAM(s) Oral at bedtime    MEDICATIONS  (PRN):  acetaminophen     Tablet .. 650 milliGRAM(s) Oral every 6 hours PRN Temp greater or equal to 38C (100.4F), Mild Pain (1 - 3)  aluminum hydroxide/magnesium hydroxide/simethicone Suspension 30 milliLiter(s) Oral every 4 hours PRN Dyspepsia  dextrose Oral Gel 15 Gram(s) Oral once PRN Blood Glucose LESS THAN 70 milliGRAM(s)/deciliter  melatonin 3 milliGRAM(s) Oral at bedtime PRN Insomnia  ondansetron Injectable 4 milliGRAM(s) IV Push every 8 hours PRN Nausea and/or Vomiting   INTERVAL/OVERNIGHT EVENTS: NAEO    SUBJECTIVE:  Patient seen and examined at bedside, comfortable, NAD. Denied fever, chest pain, dyspnea, abdominal pain.     PHYSICAL EXAM:  General: NAD  HEENT: PERRL, EOM intact, sclera anicteric, MMM  Cardiovascular: RRR; no MRG;   Respiratory: CTAB; no WRR  GI/: soft; NTND; BS x4  Extremities: WWP; 2+ peripheral pulses bilaterally; bilateral les +pitting edema  Skin: normal color & turgor; no rash  Neurologic: aox3; bilateral upper & lower extremities w reduced strength, vibratory sensation reduced in rle, unable to test gait (pt unable to ambulate), +cogwheel rigidity, hypomimia    VITAL SIGNS:  T(C): 36.6 (01-26-25 @ 08:59), Max: 37.1 (01-26-25 @ 06:18)  HR: 81 (01-26-25 @ 08:59) (74 - 92)  BP: 118/72 (01-26-25 @ 08:59) (105/67 - 123/68)  RR: 16 (01-26-25 @ 08:59) (16 - 18)  SpO2: 98% (01-26-25 @ 08:59) (97% - 98%)    acetaminophen     Tablet .. 650 milliGRAM(s) Oral every 6 hours PRN  aluminum hydroxide/magnesium hydroxide/simethicone Suspension 30 milliLiter(s) Oral every 4 hours PRN  aspirin  chewable 81 milliGRAM(s) Oral daily  carbidopa/levodopa  25/100 1 Tablet(s) Oral every 8 hours  dextrose 5%. 1000 milliLiter(s) IV Continuous <Continuous>  dextrose 5%. 1000 milliLiter(s) IV Continuous <Continuous>  dextrose 50% Injectable 25 Gram(s) IV Push once  dextrose 50% Injectable 12.5 Gram(s) IV Push once  dextrose 50% Injectable 25 Gram(s) IV Push once  dextrose Oral Gel 15 Gram(s) Oral once PRN  finasteride 5 milliGRAM(s) Oral at bedtime  furosemide    Tablet 40 milliGRAM(s) Oral every 24 hours  glucagon  Injectable 1 milliGRAM(s) IntraMuscular once  heparin  Infusion 1600 Unit(s)/Hr IV Continuous <Continuous>  insulin lispro (ADMELOG) corrective regimen sliding scale   SubCutaneous Before meals and at bedtime  melatonin 3 milliGRAM(s) Oral at bedtime PRN  ondansetron Injectable 4 milliGRAM(s) IV Push every 8 hours PRN  polyethylene glycol 3350 17 Gram(s) Oral daily  senna 2 Tablet(s) Oral at bedtime  tamsulosin 0.4 milliGRAM(s) Oral at bedtime      Consultant(s) Notes Reviewed:  [x] YES  [ ] NO  Care Discussed with Consultants/Other Providers [x] YES  [ ] NO    LABS:                        12.4   6.15  )-----------( 374      ( 26 Jan 2025 07:38 )             40.3     01-26    141  |  103  |  8   ----------------------------<  101[H]  3.5   |  28  |  0.54    Ca    9.3      26 Jan 2025 07:38  Phos  3.8     01-26  Mg     2.0     01-26    TPro  7.2  /  Alb  3.6  /  TBili  0.4  /  DBili  x   /  AST  16  /  ALT  10  /  AlkPhos  67  01-25    PTT - ( 26 Jan 2025 07:38 )  PTT:93.1 sec  Urinalysis Basic - ( 26 Jan 2025 07:38 )    Color: x / Appearance: x / SG: x / pH: x  Gluc: 101 mg/dL / Ketone: x  / Bili: x / Urobili: x   Blood: x / Protein: x / Nitrite: x   Leuk Esterase: x / RBC: x / WBC x   Sq Epi: x / Non Sq Epi: x / Bacteria: x      CAPILLARY BLOOD GLUCOSE      POCT Blood Glucose.: 97 mg/dL (26 Jan 2025 13:15)  POCT Blood Glucose.: 83 mg/dL (26 Jan 2025 09:32)  POCT Blood Glucose.: 90 mg/dL (25 Jan 2025 21:46)  POCT Blood Glucose.: 110 mg/dL (25 Jan 2025 18:05)        Urinalysis Basic - ( 26 Jan 2025 07:38 )    Color: x / Appearance: x / SG: x / pH: x  Gluc: 101 mg/dL / Ketone: x  / Bili: x / Urobili: x   Blood: x / Protein: x / Nitrite: x   Leuk Esterase: x / RBC: x / WBC x   Sq Epi: x / Non Sq Epi: x / Bacteria: x        RADIOLOGY, EKG, & ADDITIONAL TESTS:      Imaging Personally Reviewed:  [x] YES  [ ] NO

## 2025-01-26 NOTE — OCCUPATIONAL THERAPY INITIAL EVALUATION ADULT - DIAGNOSIS, OT EVAL
pt admitted for worsening gait. Pt presents with impaired cognition, decreased strength, coordination, balance, impacting ability to perform ADL and mobility.

## 2025-01-27 ENCOUNTER — TRANSCRIPTION ENCOUNTER (OUTPATIENT)
Age: 80
End: 2025-01-27

## 2025-01-27 LAB
ADD ON TEST-SPECIMEN IN LAB: SIGNIFICANT CHANGE UP
ANION GAP SERPL CALC-SCNC: 9 MMOL/L — SIGNIFICANT CHANGE UP (ref 5–17)
APTT BLD: 103.8 SEC — HIGH (ref 24.5–35.6)
APTT BLD: 69.5 SEC — HIGH (ref 24.5–35.6)
BASOPHILS # BLD AUTO: 0.03 K/UL — SIGNIFICANT CHANGE UP (ref 0–0.2)
BASOPHILS NFR BLD AUTO: 0.5 % — SIGNIFICANT CHANGE UP (ref 0–2)
BLD GP AB SCN SERPL QL: NEGATIVE — SIGNIFICANT CHANGE UP
BUN SERPL-MCNC: 8 MG/DL — SIGNIFICANT CHANGE UP (ref 7–23)
CALCIUM SERPL-MCNC: 9 MG/DL — SIGNIFICANT CHANGE UP (ref 8.4–10.5)
CHLORIDE SERPL-SCNC: 102 MMOL/L — SIGNIFICANT CHANGE UP (ref 96–108)
CO2 SERPL-SCNC: 27 MMOL/L — SIGNIFICANT CHANGE UP (ref 22–31)
CREAT SERPL-MCNC: 0.6 MG/DL — SIGNIFICANT CHANGE UP (ref 0.5–1.3)
EGFR: 99 ML/MIN/1.73M2 — SIGNIFICANT CHANGE UP
EOSINOPHIL # BLD AUTO: 0.12 K/UL — SIGNIFICANT CHANGE UP (ref 0–0.5)
EOSINOPHIL NFR BLD AUTO: 2 % — SIGNIFICANT CHANGE UP (ref 0–6)
GLUCOSE SERPL-MCNC: 92 MG/DL — SIGNIFICANT CHANGE UP (ref 70–99)
HCT VFR BLD CALC: 41.1 % — SIGNIFICANT CHANGE UP (ref 39–50)
HGB BLD-MCNC: 12.5 G/DL — LOW (ref 13–17)
IMM GRANULOCYTES NFR BLD AUTO: 0.5 % — SIGNIFICANT CHANGE UP (ref 0–0.9)
INR BLD: 1.21 — HIGH (ref 0.85–1.16)
LYMPHOCYTES # BLD AUTO: 1.53 K/UL — SIGNIFICANT CHANGE UP (ref 1–3.3)
LYMPHOCYTES # BLD AUTO: 25.7 % — SIGNIFICANT CHANGE UP (ref 13–44)
MAGNESIUM SERPL-MCNC: 1.9 MG/DL — SIGNIFICANT CHANGE UP (ref 1.6–2.6)
MCHC RBC-ENTMCNC: 26.5 PG — LOW (ref 27–34)
MCHC RBC-ENTMCNC: 30.4 G/DL — LOW (ref 32–36)
MCV RBC AUTO: 87.1 FL — SIGNIFICANT CHANGE UP (ref 80–100)
MONOCYTES # BLD AUTO: 0.63 K/UL — SIGNIFICANT CHANGE UP (ref 0–0.9)
MONOCYTES NFR BLD AUTO: 10.6 % — SIGNIFICANT CHANGE UP (ref 2–14)
NEUTROPHILS # BLD AUTO: 3.61 K/UL — SIGNIFICANT CHANGE UP (ref 1.8–7.4)
NEUTROPHILS NFR BLD AUTO: 60.7 % — SIGNIFICANT CHANGE UP (ref 43–77)
NRBC # BLD: 0 /100 WBCS — SIGNIFICANT CHANGE UP (ref 0–0)
NRBC BLD-RTO: 0 /100 WBCS — SIGNIFICANT CHANGE UP (ref 0–0)
PHOSPHATE SERPL-MCNC: 3.2 MG/DL — SIGNIFICANT CHANGE UP (ref 2.5–4.5)
PLATELET # BLD AUTO: 312 K/UL — SIGNIFICANT CHANGE UP (ref 150–400)
POTASSIUM SERPL-MCNC: 4 MMOL/L — SIGNIFICANT CHANGE UP (ref 3.5–5.3)
POTASSIUM SERPL-SCNC: 4 MMOL/L — SIGNIFICANT CHANGE UP (ref 3.5–5.3)
PROTHROM AB SERPL-ACNC: 13.9 SEC — HIGH (ref 9.9–13.4)
RBC # BLD: 4.72 M/UL — SIGNIFICANT CHANGE UP (ref 4.2–5.8)
RBC # FLD: 14.4 % — SIGNIFICANT CHANGE UP (ref 10.3–14.5)
RH IG SCN BLD-IMP: POSITIVE — SIGNIFICANT CHANGE UP
SODIUM SERPL-SCNC: 138 MMOL/L — SIGNIFICANT CHANGE UP (ref 135–145)
WBC # BLD: 5.95 K/UL — SIGNIFICANT CHANGE UP (ref 3.8–10.5)
WBC # FLD AUTO: 5.95 K/UL — SIGNIFICANT CHANGE UP (ref 3.8–10.5)

## 2025-01-27 PROCEDURE — 99233 SBSQ HOSP IP/OBS HIGH 50: CPT | Mod: GC

## 2025-01-27 PROCEDURE — 74230 X-RAY XM SWLNG FUNCJ C+: CPT | Mod: 26

## 2025-01-27 RX ORDER — ENOXAPARIN SODIUM 100 MG/ML
90 INJECTION SUBCUTANEOUS EVERY 12 HOURS
Refills: 0 | Status: DISCONTINUED | OUTPATIENT
Start: 2025-01-27 | End: 2025-01-28

## 2025-01-27 RX ORDER — ENOXAPARIN SODIUM 100 MG/ML
90 INJECTION SUBCUTANEOUS ONCE
Refills: 0 | Status: DISCONTINUED | OUTPATIENT
Start: 2025-01-27 | End: 2025-01-27

## 2025-01-27 RX ORDER — CARBIDOPA/LEVODOPA 10MG-100MG
1 TABLET ORAL EVERY 8 HOURS
Refills: 0 | Status: DISCONTINUED | OUTPATIENT
Start: 2025-01-27 | End: 2025-01-28

## 2025-01-27 RX ADMIN — Medication 0: at 13:47

## 2025-01-27 RX ADMIN — Medication 5 MILLIGRAM(S): at 22:01

## 2025-01-27 RX ADMIN — Medication 40 MILLIGRAM(S): at 19:02

## 2025-01-27 RX ADMIN — ASPIRIN 81 MILLIGRAM(S): 81 TABLET, COATED ORAL at 11:43

## 2025-01-27 RX ADMIN — POLYETHYLENE GLYCOL 3350 17 GRAM(S): 17 POWDER, FOR SOLUTION ORAL at 11:44

## 2025-01-27 RX ADMIN — ENOXAPARIN SODIUM 90 MILLIGRAM(S): 100 INJECTION SUBCUTANEOUS at 19:02

## 2025-01-27 RX ADMIN — TAMSULOSIN HYDROCHLORIDE 0.4 MILLIGRAM(S): 0.4 CAPSULE ORAL at 21:59

## 2025-01-27 RX ADMIN — Medication 1 TABLET(S): at 11:43

## 2025-01-27 RX ADMIN — Medication 1 TABLET(S): at 21:59

## 2025-01-27 RX ADMIN — Medication 40 MILLIGRAM(S): at 10:48

## 2025-01-27 RX ADMIN — Medication 16 UNIT(S)/HR: at 03:52

## 2025-01-27 RX ADMIN — Medication 16 UNIT(S)/HR: at 00:36

## 2025-01-27 NOTE — SWALLOW VFSS/MBS ASSESSMENT ADULT - ORAL PHASE COMMENTS
Oral stage is significant for reduced bolus control with bolus loss to the level of the pyriform sinuses prior to swallow initiation. A-P transport was inconsistently prolonged.

## 2025-01-27 NOTE — SWALLOW VFSS/MBS ASSESSMENT ADULT - PHARYNGEAL PHASE COMMENTS
Swallow initiation was observed at the level of the pyriform sinuses. Base of tongue to posterior pharyngeal wall contact was reduced. Pharyngeal squeeze was reduced. Epiglottis made contact with posterior pharyngeal wall without inversion. Above resulted in mild-moderate valleculae residue. Hyolaryngeal elevation and excursion were reduced. Airway closure was incomplete. Above resulted in silent aspiration during and after the swallow with thin and mildly thick liquids. Chin tuck and supraglottic swallow were not effective in improving airway protection. Pt with difficulty initiating secondary swallow on command. Cued cough was partially effective in clearing aspirated contents. Use of single small sips of mildly thick liquids resulted in only trace laryngeal penetration without aspiration.

## 2025-01-27 NOTE — DISCHARGE NOTE PROVIDER - NSDCCPCAREPLAN_GEN_ALL_CORE_FT
PRINCIPAL DISCHARGE DIAGNOSIS  Diagnosis: Atypical parkinsonism  Assessment and Plan of Treatment: You had a 1 year history of progressive decline in walking and are currently wheelchair bound. An outpatient SMITH scan shows signs of possible autoimmune cause for your progressive decline in walking linked to parkinsonism. You were admitted for lumbar puncture due to concern for PE risk in discontinuing your Eliquis considering your PE was diagnosed 3 months ago but considering the risks of deterioration staying in the hospital and the delay in the fluoroscopy-guided lumbar puncture due to having to hold your aspirin, it would be better for you to get your lumbar puncture outpatient. Please follow up with Dr. Haji within 2 weeks-1 month to set up your lumbar puncture and work up for possible paraneoplastic disease. If you notice significant worsening in your condition, please feel free to return to the hospital.     PRINCIPAL DISCHARGE DIAGNOSIS  Diagnosis: Atypical parkinsonism  Assessment and Plan of Treatment: You had a 1 year history of progressive decline in walking and are currently wheelchair bound. An outpatient SMITH scan shows signs of possible autoimmune cause for your progressive decline in walking linked to parkinsonism. You were admitted for lumbar puncture due to concern for PE risk in discontinuing your Eliquis considering your PE was diagnosed 3 months ago but considering the risks of deterioration staying in the hospital and the delay in the fluoroscopy-guided lumbar puncture due to having to hold your aspirin, it would be better for you to get your lumbar puncture outpatient. Please follow up with Dr. Haji within 2 weeks-1 month to set up your lumbar puncture and work up for possible paraneoplastic disease. Please take your lasix as prescribed. If you notice significant worsening in your condition, please feel free to return to the hospital.     PRINCIPAL DISCHARGE DIAGNOSIS  Diagnosis: Atypical parkinsonism  Assessment and Plan of Treatment: You had a 1 year history of progressive decline in walking and are currently wheelchair bound. An outpatient SMITH scan shows signs of possible autoimmune cause for your progressive decline in walking linked to parkinsonism. You were admitted for lumbar puncture due to concern for PE risk in discontinuing your Eliquis considering your PE was diagnosed 3 months ago but considering the risks of deterioration staying in the hospital and the delay in the fluoroscopy-guided lumbar puncture due to having to stop your aspirin for 5 days, it would be better for you to get your lumbar puncture outpatient. Please follow up with Dr. Haji within 2 weeks-1 month to set up your lumbar puncture and work up for possible paraneoplastic disease. Please remember to stop taking your aspirin for 5 days and eliquis for 3 days before your lumbar puncture. Please take your lasix as prescribed. If you notice significant worsening in your condition, please feel free to return to the hospital.

## 2025-01-27 NOTE — DISCHARGE NOTE NURSING/CASE MANAGEMENT/SOCIAL WORK - FINANCIAL ASSISTANCE
[facility] provides services at a reduced cost to those who are determined to be eligible through [facility]’s financial assistance program. Information regarding [facility]’s financial assistance program can be found by going to [email or web address] or by calling [phone].

## 2025-01-27 NOTE — PROGRESS NOTE ADULT - PROBLEM SELECTOR PLAN 6
Plan:  - C/w mISS  - F/u A1c  - Consistent carb diet   - Holding home metformin 500mg qd  - Holding home Ozempic A1c 6.3.   Home: metformin 500mg qd, Ozempic    - C/w mISS  - Consistent carb diet

## 2025-01-27 NOTE — DISCHARGE NOTE PROVIDER - CARE PROVIDERS DIRECT ADDRESSES
,DirectAddress_Unknown ,DirectAddress_Unknown,[email or web address]

## 2025-01-27 NOTE — PROGRESS NOTE ADULT - PROBLEM SELECTOR PLAN 2
Seen on admission TTE. W/ +hepatojugular reflex, and +4 LE pitting edema that has worsened in recent weeks. Will aim for euvolemia prior to considering repeat CTPE for AC failure / partially resolved embolus r/o. Pt w/o sx of PE (i.e. hypoxia, tachycardia) on admission..       - TTE 01/24: LVEF 55-60%, no regional wall motion abnormalities, abnormal septal motion due to RV overload. Mildly reduced RV function. RA dilation, normal LV size. PASP 49.   sp 1x dose iv lasix in ed    Plan:  - continue home lasix 40mg po qd  - q6h bladder scans  - Strict I/O, daily weights Seen on admission TTE. W/ +hepatojugular reflex, and +4 LE pitting edema that has worsened in recent weeks. Will aim for euvolemia prior to considering repeat CTPE for AC failure / partially resolved embolus r/o. Pt w/o sx of PE (i.e. hypoxia, tachycardia) on admission..       - TTE 01/24: LVEF 55-60%, no regional wall motion abnormalities, abnormal septal motion due to RV overload. Mildly reduced RV function. RA dilation, normal LV size. PASP 49.   sp 1x dose iv lasix in ed. 1/27 BNP 53 (previously 150 in Oct 2024)  On exam, 2+ edema to knees, +JVD, lungs clear    Plan:  - continue home lasix 40mg po qd  - Strict I/O, daily weights Seen on admission TTE. W/ +hepatojugular reflex, and +4 LE pitting edema that has worsened in recent weeks. Will aim for euvolemia prior to considering repeat CTPE for AC failure / partially resolved embolus r/o. Pt w/o sx of PE (i.e. hypoxia, tachycardia) on admission..       - TTE 01/24: LVEF 55-60%, no regional wall motion abnormalities, abnormal septal motion due to RV overload. Mildly reduced RV function. RA dilation, normal LV size. PASP 49.   sp 1x dose iv lasix in ed. 1/27 BNP 53 (previously 150 in Oct 2024)  On exam, 2+ edema to knees, +JVD, lungs clear    Plan:  - continue home lasix 40mg po qd  - extra dose of IV lasix 40mg today 1/27  - Strict I/O, daily weights

## 2025-01-27 NOTE — DISCHARGE NOTE PROVIDER - HOSPITAL COURSE
#Discharge: do not delete      Hospital Course (by problem):     Patient was discharged to: (home/RUBY/acute rehab/hospice, etc, and with what services – home health PT/RN? Home O2?)     New medications:  Changes to old medications:  Medications that were stopped:    Items to follow up as outpatient:    Physical exam at the time of discharge: #Discharge: do not delete    80 yo M w/w/PMH HTN, CAD, DM2, BPH, peripheral neuropathy, spinal stenosis, and PE (dx 10/2024, on Eliquis) presenting for subacute, progressive parkinsonism with increasing falls and worsening gait, with exam notable for asymmetric bradykinesia, decrement of movement, and hypomimia, with outpatient Mine Scan showing no activity in bilateral striata, now admitted for diagnostic lumbar puncture to assess for possible autoimmune or paraneoplastic etiology for parkinsonism. LP was attempted twice at bedside with dry taps.    New medications:  none    Items to follow up as outpatient:  LP; paraneoplastic workup  #Unstable gait, parkinsonism, possibly 2/2 autoimmune etiology vs primary parkinson's disease  - pending LP outpatient  - c/w home Carbidopa-Levodopa 25-100mg TID  - SLP on board given recent dysphagia w/ solids and liquids, barium swallow done 1/27, recommended puree with mildly thick liquids via small isolated sips    #Right-sided heart failure (seen on 1/24/25 TTE)  #PE (diagnosed on 10/2024)  - Outpt pulm f/u (Dr. Watts)    Physical exam at the time of discharge:  General: Appearance is consistent with chronologic age. No abnormal facies. Gross skin survey within normal limits.    Cognitive/Language:  The patient is oriented to person, place, time and date. Recent and remote memory intact. Occasionally dosed off while I was in the middle of talking to him.  Language with normal repetition, comprehension and naming. Nondysarthric. Had some hypophonia  Eyes: VFF. BL exotropia, had delayed halting saccades but otherwise, EOMI. PERRL. No ptosis/weakness of eyelid closure.    Face:  Facial sensation normal V1 - 3, no facial asymmetry. Severe hypomimia.  Ears/Nose/Throat: Hearing grossly intact b/l. Palate elevates midline. Tongue and uvula midline.   Motor examination: Cogwheel rigidity in BL UE worse in RUE compared to LUE, Has BL UE intentional and postural tremors.   Strength Exam (MRC scale): 4+/5 BL UE strength, 3/5 BL hip flexion, 4/5 BL knee extension and flexion, 4+/5 BL dorsiflexion and plantarflexion  Reflexes: 2+ b/l biceps, triceps, brachioradialis, and Achilles reflexes, 3+ patellar reflexes with crossed adductors  Sensory examination: intact and symmetric to light touch, vibration, and temperature in BL UE, decreased to vibration, light touch, and temperature at bilateral feet, with sensation increasing proximally, with LLE worse than RLE  Cerebellum: FTN intact with intentional tremor. Has decrement of rapid movement of LE equal bilaterally, with decrement of rapid movement and rapid alternating movement in UE worse on RUE compared to LUE.  Gait: Patient required assistance of examiner and fiance to stand and was unable to take a step forward #Discharge: do not delete    78 yo M w/w/PMH HTN, CAD, DM2, BPH, peripheral neuropathy, spinal stenosis, and PE (dx 10/2024, on Eliquis) presenting for subacute, progressive parkinsonism with increasing falls and worsening gait, with exam notable for asymmetric bradykinesia, decrement of movement, and hypomimia, with outpatient Mine Scan showing no activity in bilateral striata, now admitted for diagnostic lumbar puncture to assess for possible autoimmune or paraneoplastic etiology for parkinsonism. LP was attempted twice at bedside with dry taps.    New medications: none    Items to follow up as outpatient:  LP; paraneoplastic workup    #Unstable gait, parkinsonism, possibly 2/2 autoimmune etiology vs primary parkinson's disease  - pending LP outpatient  - c/w home Carbidopa-Levodopa 25-100mg TID  - SLP on board given recent dysphagia w/ solids and liquids, barium swallow done 1/27, recommended puree with mildly thick liquids via small isolated sips  - F/u outpatient with Dr. Haji for lumbar puncture and paraneoplastic work up    #Right-sided heart failure (seen on 1/24/25 TTE)  #PE (diagnosed on 10/2024)  - Outpt pulm f/u (Dr. Watts)    Physical exam at the time of discharge:  General: Appearance is consistent with chronologic age. No abnormal facies. Gross skin survey within normal limits.    Cognitive/Language:  The patient is oriented to person, place, time and date. Recent and remote memory intact. Occasionally dosed off while I was in the middle of talking to him.  Language with normal repetition, comprehension and naming. Nondysarthric. Had some hypophonia  Eyes: VFF. BL exotropia, had delayed halting saccades but otherwise, EOMI. PERRL. No ptosis/weakness of eyelid closure.    Face:  Facial sensation normal V1 - 3, no facial asymmetry. Severe hypomimia.  Ears/Nose/Throat: Hearing grossly intact b/l. Palate elevates midline. Tongue and uvula midline.   Motor examination: Cogwheel rigidity in BL UE worse in RUE compared to LUE, Has BL UE intentional and postural tremors.   Strength Exam (MRC scale): 4+/5 BL UE strength, 3/5 BL hip flexion, 4/5 BL knee extension and flexion, 4+/5 BL dorsiflexion and plantarflexion  Reflexes: 2+ b/l biceps, triceps, brachioradialis, and Achilles reflexes, 3+ patellar reflexes with crossed adductors  Sensory examination: intact and symmetric to light touch, vibration, and temperature in BL UE, decreased to vibration, light touch, and temperature at bilateral feet, with sensation increasing proximally, with LLE worse than RLE  Cerebellum: FTN intact with intentional tremor. Has decrement of rapid movement of LE equal bilaterally, with decrement of rapid movement and rapid alternating movement in UE worse on RUE compared to LUE.  Gait: Patient required assistance of examiner and fiance to stand and was unable to take a step forward #Discharge: do not delete    78 yo M w/w/PMH HTN, CAD, DM2, BPH, peripheral neuropathy, spinal stenosis, and PE (dx 10/2024, on Eliquis) presenting for subacute, progressive parkinsonism with increasing falls and worsening gait, with exam notable for asymmetric bradykinesia, decrement of movement, and hypomimia, with outpatient Mine Scan showing no activity in bilateral striata, now admitted for diagnostic lumbar puncture to assess for possible autoimmune or paraneoplastic etiology for parkinsonism. LP was attempted twice at bedside with dry taps.    New medications: lasix 40 mg PO BID, senna 2 tablets nightly PRN, miralax once daily PRN    Items to follow up as outpatient:  LP; paraneoplastic workup    #Unstable gait, parkinsonism, possibly 2/2 autoimmune etiology vs primary parkinson's disease  - pending LP outpatient  - c/w home Carbidopa-Levodopa 25-100mg TID  - SLP on board given recent dysphagia w/ solids and liquids, barium swallow done 1/27, recommended puree with mildly thick liquids via small isolated sips  - F/u outpatient with Dr. Haji for lumbar puncture and paraneoplastic work up    #Right-sided heart failure (seen on 1/24/25 TTE)  #BL LE edema  #PE (diagnosed on 10/2024)  - c/w lasix 40 mg PO BID  - Outpt pulm f/u (Dr. Watts)      Physical exam at the time of discharge:  General: Appearance is consistent with chronologic age. No abnormal facies. Gross skin survey within normal limits.    Cognitive/Language:  The patient is oriented to person, place, time and date. Recent and remote memory intact. Occasionally dosed off while I was in the middle of talking to him.  Language with normal repetition, comprehension and naming. Nondysarthric. Had some hypophonia  Eyes: VFF. BL exotropia, had delayed halting saccades but otherwise, EOMI. PERRL. No ptosis/weakness of eyelid closure.    Face:  Facial sensation normal V1 - 3, no facial asymmetry. Severe hypomimia.  Ears/Nose/Throat: Hearing grossly intact b/l. Palate elevates midline. Tongue and uvula midline.   Motor examination: Cogwheel rigidity in BL UE worse in RUE compared to LUE, Has BL UE intentional and postural tremors.   Strength Exam (MRC scale): 4+/5 BL UE strength, 3/5 BL hip flexion, 4/5 BL knee extension and flexion, 4+/5 BL dorsiflexion and plantarflexion  Reflexes: 2+ b/l biceps, triceps, brachioradialis, and Achilles reflexes, 3+ patellar reflexes with crossed adductors  Sensory examination: intact and symmetric to light touch, vibration, and temperature in BL UE, decreased to vibration, light touch, and temperature at bilateral feet, with sensation increasing proximally, with LLE worse than RLE  Cerebellum: FTN intact with intentional tremor. Has decrement of rapid movement of LE equal bilaterally, with decrement of rapid movement and rapid alternating movement in UE worse on RUE compared to LUE.  Gait: Patient required assistance of examiner and fiance to stand and was unable to take a step forward

## 2025-01-27 NOTE — PROGRESS NOTE ADULT - PROBLEM SELECTOR PLAN 4
Plan:  - C/w home ASA  - Holding home Otezla 30mg qd (non-form)

## 2025-01-27 NOTE — DISCHARGE NOTE PROVIDER - NSDCFUSCHEDAPPT_GEN_ALL_CORE_FT
[place]  [facility] PreAdmits  Scheduled Appointment: 01/31/2025    [facility]  MRI  [address]  Scheduled Appointment: 01/31/2025    [facility]  MRI  [address]  Scheduled Appointment: 01/31/2025

## 2025-01-27 NOTE — PROGRESS NOTE ADULT - SUBJECTIVE AND OBJECTIVE BOX
INTERVAL/OVERNIGHT EVENTS: NAEO    SUBJECTIVE:  Patient seen and examined at bedside, comfortable, NAD. Denied fever, chest pain, dyspnea, abdominal pain.     PHYSICAL EXAM:  General: NAD  HEENT: PERRL, EOM intact, sclera anicteric, MMM  Cardiovascular: RRR; no MRG;   Respiratory: CTAB; no WRR  GI/: soft; NTND; BS x4  Extremities: WWP; 2+ peripheral pulses bilaterally; bilateral les +pitting edema  Skin: normal color & turgor; no rash  Neurologic: aox3; bilateral upper & lower extremities w reduced strength, vibratory sensation reduced in rle, unable to test gait (pt unable to ambulate), +cogwheel rigidity, hypomimia    VITAL SIGNS:  T(C): 36.4 (01-27-25 @ 05:35), Max: 36.7 (01-26-25 @ 21:07)  HR: 90 (01-27-25 @ 05:35) (81 - 98)  BP: 142/79 (01-27-25 @ 05:35) (101/68 - 142/79)  RR: 18 (01-27-25 @ 05:35) (16 - 18)  SpO2: 96% (01-27-25 @ 05:35) (96% - 100%)    acetaminophen     Tablet .. 650 milliGRAM(s) Oral every 6 hours PRN  aluminum hydroxide/magnesium hydroxide/simethicone Suspension 30 milliLiter(s) Oral every 4 hours PRN  aspirin  chewable 81 milliGRAM(s) Oral daily  carbidopa/levodopa  25/100 1 Tablet(s) Oral every 8 hours  dextrose 5%. 1000 milliLiter(s) IV Continuous <Continuous>  dextrose 5%. 1000 milliLiter(s) IV Continuous <Continuous>  dextrose 50% Injectable 25 Gram(s) IV Push once  dextrose 50% Injectable 12.5 Gram(s) IV Push once  dextrose 50% Injectable 25 Gram(s) IV Push once  dextrose Oral Gel 15 Gram(s) Oral once PRN  finasteride 5 milliGRAM(s) Oral at bedtime  furosemide    Tablet 40 milliGRAM(s) Oral every 24 hours  glucagon  Injectable 1 milliGRAM(s) IntraMuscular once  heparin  Infusion 1600 Unit(s)/Hr IV Continuous <Continuous>  insulin lispro (ADMELOG) corrective regimen sliding scale   SubCutaneous Before meals and at bedtime  melatonin 3 milliGRAM(s) Oral at bedtime PRN  ondansetron Injectable 4 milliGRAM(s) IV Push every 8 hours PRN  polyethylene glycol 3350 17 Gram(s) Oral daily  senna 2 Tablet(s) Oral at bedtime  tamsulosin 0.4 milliGRAM(s) Oral at bedtime      Consultant(s) Notes Reviewed:  [x] YES  [ ] NO  Care Discussed with Consultants/Other Providers [x] YES  [ ] NO    LABS:                        12.5   5.95  )-----------( 312      ( 27 Jan 2025 05:30 )             41.1     01-27    138  |  102  |  8   ----------------------------<  92  4.0   |  27  |  0.60    Ca    9.0      27 Jan 2025 05:30  Phos  3.2     01-27  Mg     1.9     01-27      PT/INR - ( 27 Jan 2025 05:30 )   PT: 13.9 sec;   INR: 1.21          PTT - ( 27 Jan 2025 05:30 )  PTT:103.8 sec  Urinalysis Basic - ( 27 Jan 2025 05:30 )    Color: x / Appearance: x / SG: x / pH: x  Gluc: 92 mg/dL / Ketone: x  / Bili: x / Urobili: x   Blood: x / Protein: x / Nitrite: x   Leuk Esterase: x / RBC: x / WBC x   Sq Epi: x / Non Sq Epi: x / Bacteria: x      CAPILLARY BLOOD GLUCOSE      POCT Blood Glucose.: 87 mg/dL (26 Jan 2025 22:17)  POCT Blood Glucose.: 143 mg/dL (26 Jan 2025 17:52)  POCT Blood Glucose.: 97 mg/dL (26 Jan 2025 13:15)  POCT Blood Glucose.: 83 mg/dL (26 Jan 2025 09:32)        Urinalysis Basic - ( 27 Jan 2025 05:30 )    Color: x / Appearance: x / SG: x / pH: x  Gluc: 92 mg/dL / Ketone: x  / Bili: x / Urobili: x   Blood: x / Protein: x / Nitrite: x   Leuk Esterase: x / RBC: x / WBC x   Sq Epi: x / Non Sq Epi: x / Bacteria: x        RADIOLOGY, EKG, & ADDITIONAL TESTS:      Imaging Personally Reviewed:  [x] YES  [ ] NO     INTERVAL/OVERNIGHT EVENTS: NAEO    SUBJECTIVE:  Patient seen and examined at bedside, comfortable, NAD. Denied fever, chest pain, dyspnea, abdominal pain.     PHYSICAL EXAM:  General: NAD  HEENT: PERRL, EOM intact, sclera anicteric, MMM  Cardiovascular: RRR; no MRG; +JVD  Respiratory: CTAB; no WRR  GI/: soft; NTND; BS x4  Extremities: WWP; 2+ peripheral pulses bilaterally; 2+ BLE edema to kneeds  Skin: normal color & turgor; no rash  Neurologic: aox3; bilateral upper & lower extremities w reduced strength, vibratory sensation reduced in rle, unable to test gait (pt unable to ambulate), +cogwheel rigidity, hypomimia    VITAL SIGNS:  T(C): 36.4 (01-27-25 @ 05:35), Max: 36.7 (01-26-25 @ 21:07)  HR: 90 (01-27-25 @ 05:35) (81 - 98)  BP: 142/79 (01-27-25 @ 05:35) (101/68 - 142/79)  RR: 18 (01-27-25 @ 05:35) (16 - 18)  SpO2: 96% (01-27-25 @ 05:35) (96% - 100%)    acetaminophen     Tablet .. 650 milliGRAM(s) Oral every 6 hours PRN  aluminum hydroxide/magnesium hydroxide/simethicone Suspension 30 milliLiter(s) Oral every 4 hours PRN  aspirin  chewable 81 milliGRAM(s) Oral daily  carbidopa/levodopa  25/100 1 Tablet(s) Oral every 8 hours  dextrose 5%. 1000 milliLiter(s) IV Continuous <Continuous>  dextrose 5%. 1000 milliLiter(s) IV Continuous <Continuous>  dextrose 50% Injectable 25 Gram(s) IV Push once  dextrose 50% Injectable 12.5 Gram(s) IV Push once  dextrose 50% Injectable 25 Gram(s) IV Push once  dextrose Oral Gel 15 Gram(s) Oral once PRN  finasteride 5 milliGRAM(s) Oral at bedtime  furosemide    Tablet 40 milliGRAM(s) Oral every 24 hours  glucagon  Injectable 1 milliGRAM(s) IntraMuscular once  heparin  Infusion 1600 Unit(s)/Hr IV Continuous <Continuous>  insulin lispro (ADMELOG) corrective regimen sliding scale   SubCutaneous Before meals and at bedtime  melatonin 3 milliGRAM(s) Oral at bedtime PRN  ondansetron Injectable 4 milliGRAM(s) IV Push every 8 hours PRN  polyethylene glycol 3350 17 Gram(s) Oral daily  senna 2 Tablet(s) Oral at bedtime  tamsulosin 0.4 milliGRAM(s) Oral at bedtime      Consultant(s) Notes Reviewed:  [x] YES  [ ] NO  Care Discussed with Consultants/Other Providers [x] YES  [ ] NO    LABS:                        12.5   5.95  )-----------( 312      ( 27 Jan 2025 05:30 )             41.1     01-27    138  |  102  |  8   ----------------------------<  92  4.0   |  27  |  0.60    Ca    9.0      27 Jan 2025 05:30  Phos  3.2     01-27  Mg     1.9     01-27      PT/INR - ( 27 Jan 2025 05:30 )   PT: 13.9 sec;   INR: 1.21          PTT - ( 27 Jan 2025 05:30 )  PTT:103.8 sec  Urinalysis Basic - ( 27 Jan 2025 05:30 )    Color: x / Appearance: x / SG: x / pH: x  Gluc: 92 mg/dL / Ketone: x  / Bili: x / Urobili: x   Blood: x / Protein: x / Nitrite: x   Leuk Esterase: x / RBC: x / WBC x   Sq Epi: x / Non Sq Epi: x / Bacteria: x      CAPILLARY BLOOD GLUCOSE      POCT Blood Glucose.: 87 mg/dL (26 Jan 2025 22:17)  POCT Blood Glucose.: 143 mg/dL (26 Jan 2025 17:52)  POCT Blood Glucose.: 97 mg/dL (26 Jan 2025 13:15)  POCT Blood Glucose.: 83 mg/dL (26 Jan 2025 09:32)        Urinalysis Basic - ( 27 Jan 2025 05:30 )    Color: x / Appearance: x / SG: x / pH: x  Gluc: 92 mg/dL / Ketone: x  / Bili: x / Urobili: x   Blood: x / Protein: x / Nitrite: x   Leuk Esterase: x / RBC: x / WBC x   Sq Epi: x / Non Sq Epi: x / Bacteria: x        RADIOLOGY, EKG, & ADDITIONAL TESTS:      Imaging Personally Reviewed:  [x] YES  [ ] NO     *************TRANSFER FROM MEDICINE TO NEURO*******************  HOSPITAL COURSE  79M PMH HTN, CAD, DM2, BPH, peripheral neuropathy, spinal stenosis, PE w/ RHS (dx 10/2024, on Eliquis), ileus/colonic distention, presented per outpt neurologist (Dr. Haji) for subacute, progressive gait decline, found w/ RH strain on echo, admitted for LP and RHS eval. Patient pending LP with IR. He was briefly placed on heparin gtt for full AC, then transitioned to Lovenox full AC dosing, plan to hold Lovenox 12hrs prior to procedure. Patient also noted to have bilateral chronic LE edema, BNP this admission wnl. He was resumed on his home Lasix 40mg qd regimen with an extra dose of IV Lasix 40mg given on 1/27. Pt also seen by S&S, s/p barium swallow study and diet advanced to pureed with mildly thickened liquids.     INTERVAL/OVERNIGHT EVENTS: NAEO    SUBJECTIVE:  Patient seen and examined at bedside, comfortable, NAD. Denied fever, chest pain, dyspnea, abdominal pain.     PHYSICAL EXAM:  General: NAD  HEENT: PERRL, EOM intact, sclera anicteric, MMM  Cardiovascular: RRR; no MRG; +JVD  Respiratory: CTAB; no WRR  GI/: soft; NTND; BS x4  Extremities: WWP; 2+ peripheral pulses bilaterally; 2+ BLE edema to kneeds  Skin: normal color & turgor; no rash  Neurologic: aox3; bilateral upper & lower extremities w reduced strength, vibratory sensation reduced in rle, unable to test gait (pt unable to ambulate), +cogwheel rigidity, hypomimia    VITAL SIGNS:  T(C): 36.4 (01-27-25 @ 05:35), Max: 36.7 (01-26-25 @ 21:07)  HR: 90 (01-27-25 @ 05:35) (81 - 98)  BP: 142/79 (01-27-25 @ 05:35) (101/68 - 142/79)  RR: 18 (01-27-25 @ 05:35) (16 - 18)  SpO2: 96% (01-27-25 @ 05:35) (96% - 100%)    acetaminophen     Tablet .. 650 milliGRAM(s) Oral every 6 hours PRN  aluminum hydroxide/magnesium hydroxide/simethicone Suspension 30 milliLiter(s) Oral every 4 hours PRN  aspirin  chewable 81 milliGRAM(s) Oral daily  carbidopa/levodopa  25/100 1 Tablet(s) Oral every 8 hours  dextrose 5%. 1000 milliLiter(s) IV Continuous <Continuous>  dextrose 5%. 1000 milliLiter(s) IV Continuous <Continuous>  dextrose 50% Injectable 25 Gram(s) IV Push once  dextrose 50% Injectable 12.5 Gram(s) IV Push once  dextrose 50% Injectable 25 Gram(s) IV Push once  dextrose Oral Gel 15 Gram(s) Oral once PRN  finasteride 5 milliGRAM(s) Oral at bedtime  furosemide    Tablet 40 milliGRAM(s) Oral every 24 hours  glucagon  Injectable 1 milliGRAM(s) IntraMuscular once  heparin  Infusion 1600 Unit(s)/Hr IV Continuous <Continuous>  insulin lispro (ADMELOG) corrective regimen sliding scale   SubCutaneous Before meals and at bedtime  melatonin 3 milliGRAM(s) Oral at bedtime PRN  ondansetron Injectable 4 milliGRAM(s) IV Push every 8 hours PRN  polyethylene glycol 3350 17 Gram(s) Oral daily  senna 2 Tablet(s) Oral at bedtime  tamsulosin 0.4 milliGRAM(s) Oral at bedtime      Consultant(s) Notes Reviewed:  [x] YES  [ ] NO  Care Discussed with Consultants/Other Providers [x] YES  [ ] NO    LABS:                        12.5   5.95  )-----------( 312      ( 27 Jan 2025 05:30 )             41.1     01-27    138  |  102  |  8   ----------------------------<  92  4.0   |  27  |  0.60    Ca    9.0      27 Jan 2025 05:30  Phos  3.2     01-27  Mg     1.9     01-27      PT/INR - ( 27 Jan 2025 05:30 )   PT: 13.9 sec;   INR: 1.21          PTT - ( 27 Jan 2025 05:30 )  PTT:103.8 sec  Urinalysis Basic - ( 27 Jan 2025 05:30 )    Color: x / Appearance: x / SG: x / pH: x  Gluc: 92 mg/dL / Ketone: x  / Bili: x / Urobili: x   Blood: x / Protein: x / Nitrite: x   Leuk Esterase: x / RBC: x / WBC x   Sq Epi: x / Non Sq Epi: x / Bacteria: x      CAPILLARY BLOOD GLUCOSE      POCT Blood Glucose.: 87 mg/dL (26 Jan 2025 22:17)  POCT Blood Glucose.: 143 mg/dL (26 Jan 2025 17:52)  POCT Blood Glucose.: 97 mg/dL (26 Jan 2025 13:15)  POCT Blood Glucose.: 83 mg/dL (26 Jan 2025 09:32)        Urinalysis Basic - ( 27 Jan 2025 05:30 )    Color: x / Appearance: x / SG: x / pH: x  Gluc: 92 mg/dL / Ketone: x  / Bili: x / Urobili: x   Blood: x / Protein: x / Nitrite: x   Leuk Esterase: x / RBC: x / WBC x   Sq Epi: x / Non Sq Epi: x / Bacteria: x        RADIOLOGY, EKG, & ADDITIONAL TESTS:      Imaging Personally Reviewed:  [x] YES  [ ] NO

## 2025-01-27 NOTE — PROGRESS NOTE ADULT - SUBJECTIVE AND OBJECTIVE BOX
Physical Medicine and Rehabilitation Progress Note :       Patient is a 79y old  Male who presents with a chief complaint of weakness, difficulty ambulating (27 Jan 2025 08:36)      HPI:  79M PMH HTN, CAD, DM2, BPH, peripheral neuropathy, spinal stenosis, PE (dx 10/2024, on Eliquis), ileus/colonic distention, presented per outpt neurologist (Dr. Haji) for subacute, progressive gait decline first noticed 12/2023. Pt initially was able to run multiple blocks. By 05/2024, was only able to walk 1 block, and became mostly wheelchair bound by 12/2024. Pt and wife at bedside notes he's been "moving  more slowly" in recent months, and will occasionally "freeze" in place and find it difficult to move again. Notes additional difficulty getting up from and into chairs/bed, and has started to occasionally cough after eating and drinking. Pt's baseline chronic intention tremor has also worsened in the past year as well. Patient has fallen x4 since November while using a walker at home.     Pt dx w/ PE w/ RH strain at recent 10/2024 admission. Compliant w/ home Eliquis. Noted w/ worsening of chronic LE swelling in recent weeks, alleviated by elevating legs.    ED Course-  ·	VS:  97.7 F oral  |  113/76 mm Hg  |  HR 84 /min  |  RR 18 /min  |  96% room air  ·	Labs:  CBC -  WBC, PLT wnl, HGB (at recent baseline)  |  CMP - Cl 94, ALT 6, otherwise wnl.  ·	Imaging:   (1) CXR: Left basilar focal atelectasis, elevation of the left hemidiaphragm. Redemonstration dextrocardia, thoracic aortic calcification  (2) TTE: LVEF 55-60%, no regional wall motion abnormalities, abnormal septal motion due to RV overload. Mildly reduced RV function. RA dilation, normal LV size. PASP 49.   ·	EKG: NSR, q-waves in V4, V5, QTc 456  ·	Consults: Neuro  ·	Interventions: LP (unsuccessful for arthritis) (24 Jan 2025 20:12)                            12.5   5.95  )-----------( 312      ( 27 Jan 2025 05:30 )             41.1       01-27    138  |  102  |  8   ----------------------------<  92  4.0   |  27  |  0.60    Ca    9.0      27 Jan 2025 05:30  Phos  3.2     01-27  Mg     1.9     01-27      Vital Signs Last 24 Hrs  T(C): 36.4 (27 Jan 2025 05:35), Max: 36.7 (26 Jan 2025 21:07)  T(F): 97.5 (27 Jan 2025 05:35), Max: 98.1 (26 Jan 2025 21:07)  HR: 90 (27 Jan 2025 05:35) (81 - 98)  BP: 142/79 (27 Jan 2025 05:35) (101/68 - 142/79)  BP(mean): 81 (26 Jan 2025 21:07) (81 - 81)  RR: 18 (27 Jan 2025 05:35) (16 - 18)  SpO2: 96% (27 Jan 2025 05:35) (96% - 100%)    Parameters below as of 27 Jan 2025 05:35  Patient On (Oxygen Delivery Method): room air        MEDICATIONS  (STANDING):  aspirin  chewable 81 milliGRAM(s) Oral daily  carbidopa/levodopa  25/100 1 Tablet(s) Oral every 8 hours  dextrose 5%. 1000 milliLiter(s) (100 mL/Hr) IV Continuous <Continuous>  dextrose 5%. 1000 milliLiter(s) (50 mL/Hr) IV Continuous <Continuous>  dextrose 50% Injectable 25 Gram(s) IV Push once  dextrose 50% Injectable 12.5 Gram(s) IV Push once  dextrose 50% Injectable 25 Gram(s) IV Push once  finasteride 5 milliGRAM(s) Oral at bedtime  furosemide    Tablet 40 milliGRAM(s) Oral every 24 hours  glucagon  Injectable 1 milliGRAM(s) IntraMuscular once  heparin  Infusion 1600 Unit(s)/Hr (16 mL/Hr) IV Continuous <Continuous>  insulin lispro (ADMELOG) corrective regimen sliding scale   SubCutaneous Before meals and at bedtime  polyethylene glycol 3350 17 Gram(s) Oral daily  senna 2 Tablet(s) Oral at bedtime  tamsulosin 0.4 milliGRAM(s) Oral at bedtime    MEDICATIONS  (PRN):  acetaminophen     Tablet .. 650 milliGRAM(s) Oral every 6 hours PRN Temp greater or equal to 38C (100.4F), Mild Pain (1 - 3)  aluminum hydroxide/magnesium hydroxide/simethicone Suspension 30 milliLiter(s) Oral every 4 hours PRN Dyspepsia  dextrose Oral Gel 15 Gram(s) Oral once PRN Blood Glucose LESS THAN 70 milliGRAM(s)/deciliter  melatonin 3 milliGRAM(s) Oral at bedtime PRN Insomnia  ondansetron Injectable 4 milliGRAM(s) IV Push every 8 hours PRN Nausea and/or Vomiting      T(C): 36.4 (01-27-25 @ 05:35), Max: 36.7 (01-26-25 @ 21:07)  HR: 90 (01-27-25 @ 05:35) (81 - 98)  BP: 142/79 (01-27-25 @ 05:35) (101/68 - 142/79)  RR: 18 (01-27-25 @ 05:35) (16 - 18)  SpO2: 96% (01-27-25 @ 05:35) (96% - 100%)    Physical Exam:   79 y o man lying comfortably in semi Rock's position , awake , alert , no acute complaints      Head: normocephalic , atraumatic    Eyes: PERRLA , EOMI , no nystagmus , sclera anicteric    ENT / FACE: neg nasal discharge , uvula midline , no oropharyngeal erythema / exudate    Neck: supple , negative JVD , negative carotid bruits , no thyromegaly    Chest: CTA bilaterally     Cardiovascular: regular rate and rhythm , neg murmurs / rubs / gallops    Abdomen: soft , non distended , no tenderness to palpation in all 4 quadrants ,  normal bowel sounds     Extremities: WWP , neg cyanosis /clubbing / edema     Neurologic Exam:     Alert and oriented to person , place , date/year , speech fluent w/o dysarthria     Cranial Nerves:           II:                         pupils equal , round and reactive to light , visual fields intact         III/ IV/VI:             extraocular movements intact , neg nystagmus , neg ptosis        V:                        facial sensation intact , V1-3 normal        VII:                      face symmetric , no droop , normal eye closure and smile        VIII:                     hearing intact to finger rub bilaterally        IX and X:             no hoarseness , gag intact , palate/ uvula rise symmetrically        XI:                       SCM / trapezius strength intact bilateral        XII:                      no tongue deviation    Motor Exam:        > 3+/5 x 4 extremities , without drift     Sensation:         intact to light touch x 4 extremities                            no neglect or extinction on double simultaneous testing    DTR:           biceps/brachioradialis: equal                            patella/ankle: equal          neg Babinski     Coordination:            Finger to Nose:  neg dysmetria bilaterally        1/26/2025  Functional Status Assessment :     Previous Level of Function:     · Bed Mobility/Transfers	independent; needs device  · Bathing	independent  · Upper Body Dressing	independent  · Lower Body Dressing	independent  · Grooming	independent  · Toileting	independent  · Eating	independent  · Home Management Skills	needed assist  · Additional Comments	Pt lives in an apt with his wife with no ALVINA. Pt uses a RW inside the home and WC outside the home for functional mobility. Pt has a tub shower, shower chair and grab bars.    · Ambulatory Devices Needed	yes  RW, WC  · Adaptive Equipment Needed	no    Cognitive Status Examination:   · Level of Consciousness	alert; confused  · Orientation	person; place; situation  · Follow Commands/Answers Questions	75% of the time; able to follow single-step instructions  · Personal Safety and Judgment	intact  · Short Term Memory	impaired  · Long Term Memory	intact    Range of Motion Exam:   · Range of Motion Examination, Upper Extremity	bilateral UE Active ROM was WFL  (within functional limits)  · Range of Motion Examination, Lower Extremity	bilateral LE Active ROM was WFL  (within functional limits)    Manual Muscle Testing:   · Manual Muscle Testing Results	grossly 3/5    Muscle Tone Assessment:   · Muscle Tone Assessment	bilateral upper extremities; bilateral lower extremities; normal    Bed Mobility: Rolling/Turning:     · Level of [place]	minimum assist (75% patients effort)  · Physical Assist/Nonphysical Assist	verbal cues; nonverbal cues (demo/gestures); 1 person assist    Bed Mobility: Scooting/Bridging:     · Level of [place]	minimum assist (75% patients effort)  · Physical Assist/Nonphysical Assist	verbal cues; nonverbal cues (demo/gestures); 1 person assist    Bed Mobility: Sit to Supine:     · Level of [place]	minimum assist (75% patients effort)  · Physical Assist/Nonphysical Assist	verbal cues; nonverbal cues (demo/gestures); 1 person assist    Bed Mobility: Supine to Sit:     · Level of [place]	minimum assist (75% patients effort)  · Physical Assist/Nonphysical Assist	verbal cues; nonverbal cues (demo/gestures); 1 person assist    Bed Mobility Analysis:     · Bed Mobility Limitations	decreased ability to use arms for pushing/pulling; decreased ability to use legs for bridging/pushing; impaired ability to control trunk for mobility  · Impairments Contributing to Impaired Bed Mobility	impaired balance; impaired coordination; decreased flexibility; impaired postural control; decreased strength    Transfer: Sit to Stand:     · Level of [place]	moderate assist (50% patients effort)  · Physical Assist/Nonphysical Assist	verbal cues; nonverbal cues (demo/gestures); 1 person assist  · Assistive Device	rolling walker    Transfer: Stand to Sit:     · Level of [place]	moderate assist (50% patients effort)  · Physical Assist/Nonphysical Assist	verbal cues; 1 person assist; nonverbal cues (demo/gestures)  · Assistive Device	rolling walker    Sit/Stand Transfer Safety Analysis:     · Impairments Contributing to Impaired Transfers	impaired balance; impaired coordination; decreased flexibility; impaired postural control; decreased strength    Balance Skills Assessment:     · Sitting Balance: Static	good balance  · Sitting Balance: Dynamic	fair balance  · Sit-to-Stand Balance	poor plus  · Standing Balance: Static	fair minus  · Standing Balance: Dynamic	poor plus  · Identified Impairments Contributing to Balance Disturbance	impaired coordination; impaired motor control; impaired postural control; decreased strength    Sensory Examination:   · Balance Skills	Pt performed STS with modA +RW, pt took ~4 steps along the EOB with Kacy +RW, pt with BUE tremors throughout.      Light Touch Sensation:   · Left UE	within normal limits  · Right UE	within normal limits  · Left LE	within normal limits  · Right LE	within normal limits      Visual Assessment:   Visual Assessment:    Visual Assessment:   · Eye Muscle Balance	normal  · Visual Scanning	WFL    Fine Motor Coordination:     Fine Motor Coordination:   · Left Hand, Manipulation of Objects	normal performance  · Right Hand, Manipulation of Objects	normal performance    Lower Body Dressing Training:     · Level of [place]	maximum assist (25% patients effort)  · Physical Assist/Nonphysical Assist	verbal cues; nonverbal cues (demo/gestures); 1 person assist    Eating/Self-Feeding Training:     · Level of [place]	minimum assist (75% patients effort)  · Physical Assist/Nonphysical Assist	verbal cues; nonverbal cues (demo/gestures); 1 person assist                PM&R Impression : as above    Current disposition plan recommendation :    subacute rehab placement

## 2025-01-27 NOTE — PROGRESS NOTE ADULT - PROBLEM SELECTOR PLAN 1
Likely 2/2 Parkinson's, given bradykinesia, hypomimia, and cogwheel rigidity. C/f concurrent AI process given uncharacteristically rapid decline over past 1y. Failed bedside LP in ED, will pursue IR-guided LP to r/o AI vs paraneoplastic process.     Plan:   - Plan for IR-guided LP on Monday (01/17)       - Holding Eliquis for procedure (last dose: 01/23)       - Heparin ggt for full AC until procedure  - Resume home Carbidopa-Levodopa 25-100mg TID  - Neuro on board  - SLP on board given recent dysphagia w/ solids and liquids Likely 2/2 Parkinson's, given bradykinesia, hypomimia, and cogwheel rigidity. C/f concurrent AI process given uncharacteristically rapid decline over past 1y. Failed bedside LP in ED, will pursue IR-guided LP to r/o AI vs paraneoplastic process.     Plan:   - pending IR-guided LP, possibly Tuesday 1/28       - Holding Eliquis for procedure (last dose: 01/23)       - Lovenox 90mg BID, hold 12hrs prior to procedure       - d/c Heparin ggt   - c/w home Carbidopa-Levodopa 25-100mg TID  - f/u neuro recs  - SLP on board given recent dysphagia w/ solids and liquids, f/u barium swallow done 1/27

## 2025-01-27 NOTE — SWALLOW VFSS/MBS ASSESSMENT ADULT - ADDITIONAL INFORMATION
Calcified styloid process was appreciated along with a prominent hyoid bone. Please see radiologist report for details.

## 2025-01-27 NOTE — PROGRESS NOTE ADULT - ATTENDING COMMENTS
Mr. Mera is a 78yo man with PMHx T2DM, CAD, HTN, HFpEF, recent PE on Eliquis (10/2024) who was sent in by his OP neurologist (Dr. Haji) for rapidly progressing parkinsonism, admitted for further work-up and management.     Patient seen this morning, resting in bed. States he feels confused, does not know why he is in the hospital. Able to be re-oriented.    Bedside LP attempted by neurology 1/24, unsuccessful. Now pending LP with IR on 1/27. Eliquis held and on heparin gtt prior to procedure.  Seen by PT and recommending RUBY. Seen by SLP, recommending puree diet with no liquids and MBS (Monday).
Mr. Mera is a 80yo man with PMHx T2DM, CAD, HTN, HFpEF, recent PE on Eliquis (10/2024) who was sent in by his OP neurologist (Dr. Haji) for rapidly progressing parkinsonism, admitted for further work-up and management.     Patient seen this morning, resting in bed. Reports significant difficulty with gait over the last several months, leading to multiple falls and now the use of a wheelchair outside the home.     Bedside LP attempted by neurology 1/24, unsuccessful. Now pending LP with IR on 1/27. Eliquis held and on heparin gtt prior to procedure.  Seen by PT and recommending RUBY. Seen by SLP, recommending puree diet with no liquids and MBS (Monday).
Mr. Mera is a 78yo man with PMHx T2DM, CAD, HTN, HFpEF, recent PE on Eliquis (10/2024) who was sent in by his OP neurologist (Dr. Haji) for rapidly progressing parkinsonism, admitted for further work-up and management.     Patient seen this morning, resting in bed. States he feels confused, does not know why he is in the hospital.     wife at bedside , she reports his weakness and trouble with ambulation has been gradually progressing and not acute.       - Bedside LP attempted by neurology 1/24, unsuccessful.   - Now pending LP with IR on 1/28   - Eliquis held and on [id number] Lovenox  prior to procedure , hold after midnight.    - Seen by PT and recommending RUBY - pateint does not want to go to [facility] and wants to go home   - Seen by SLP, MBS performed   - [ ] SLP recs   - TTE with RV dysfunction and Overload , most likely due to Recent PE ( 10/24 ) , Clinically patient had edema 3+ , extending to the Knee , above the Knee its minimal and only dependent edema , lungs are clear on EXAM , JVP upto 2 cm above clavicle ,  Was Given  IV Lasix 40mg on 1/24 and 1/27 in addition to his PO lasix, he is not in acute decompensated HF and is chronic , even patient and his wife report lower extremity edema waxes and wanes and that it has been present for > 6 months

## 2025-01-27 NOTE — SWALLOW VFSS/MBS ASSESSMENT ADULT - RECOMMENDED FEEDING/EATING TECHNIQUES
maintain upright posture during/after eating for 30 mins/oral hygiene/position upright (90 degrees)/small sips/bites Quality 431: Preventive Care And Screening: Unhealthy Alcohol Use - Screening: Patient not identified as an unhealthy alcohol user when screened for unhealthy alcohol use using a systematic screening method Quality 226: Preventive Care And Screening: Tobacco Use: Screening And Cessation Intervention: Patient screened for tobacco use and is an ex/non-smoker Quality 130: Documentation Of Current Medications In The Medical Record: Current Medications Documented Detail Level: Generalized

## 2025-01-27 NOTE — DISCHARGE NOTE PROVIDER - NSDCMRMEDTOKEN_GEN_ALL_CORE_FT
Albuterol (Eqv-ProAir HFA) 90 mcg/inh inhalation aerosol: 2 puff(s) inhaled every 6 hours as needed for  shortness of breath and/or wheezing  apixaban 5 mg oral tablet: 2 tab(s) orally every 12 hours Take 2 tablets every day for 5 days until 10/27 then take 1 tablet every day  Aspirin EC 81 mg oral delayed release tablet: 1 tab(s) orally once a day  finasteride 5 mg oral tablet: 1 tab(s) orally once a day (at bedtime)  gabapentin 300 mg oral capsule: 1 cap(s) orally 3 times a day  metFORMIN 500 mg oral tablet: 1 tab(s) orally 2 times a day  Ozempic 2 mg/1.5 mL (0.25 mg or 0.5 mg dose) subcutaneous solution: 1 milligram(s) subcutaneously  tamsulosin 0.4 mg oral capsule: 1 cap(s) orally once a day   Albuterol (Eqv-ProAir HFA) 90 mcg/inh inhalation aerosol: 2 puff(s) inhaled every 6 hours as needed for  shortness of breath and/or wheezing  apixaban 5 mg oral tablet: 2 tab(s) orally every 12 hours Take 2 tablets every day for 5 days until 10/27 then take 1 tablet every day  Aspirin EC 81 mg oral delayed release tablet: 1 tab(s) orally once a day  carbidopa-levodopa 25 mg-100 mg oral tablet: 1 tab(s) orally every 8 hours  finasteride 5 mg oral tablet: 1 tab(s) orally once a day (at bedtime)  furosemide 40 mg oral tablet: 1 tab(s) orally 2 times a day  metFORMIN 500 mg oral tablet: 1 tab(s) orally 2 times a day  Otezla 30 mg oral tablet: 1 tab(s) orally once a day  Ozempic 2 mg/1.5 mL (0.25 mg or 0.5 mg dose) subcutaneous solution: 1 milligram(s) subcutaneously  polyethylene glycol 3350 oral powder for reconstitution: 17 gram(s) orally once a day as needed for  constipation  senna leaf extract oral tablet: 2 tab(s) orally once a day (at bedtime) as needed for  constipation  tamsulosin 0.4 mg oral capsule: 1 cap(s) orally once a day

## 2025-01-27 NOTE — PROGRESS NOTE ADULT - PROBLEM SELECTOR PLAN 5
Plan:  - Resume home tamsulosin 0.4mg qHS  - Resume home finasteride 5mg qHS Plan:  - c/w home tamsulosin 0.4mg qHS and finasteride 5mg qHS

## 2025-01-27 NOTE — PROGRESS NOTE ADULT - PROBLEM SELECTOR PROBLEM 4
History of CAD (coronary artery disease)

## 2025-01-27 NOTE — DISCHARGE NOTE PROVIDER - CARE PROVIDER_API CALL
Nickie Watts  Pulmonary Disease  [address] [place] [address]  Phone: [phone]  Fax: [phone]  Follow Up Time: 2 weeks   Nickie Watts  Pulmonary Disease  [address], [address] [place] [address]  Phone: [phone]  Fax: [phone]  Follow Up Time: 2 weeks    Oneal Haji  Neurology  [address] [place] [address], [address]  [place] [address]  Phone: [phone]  Fax: [phone]  Established Patient  Follow Up Time: 2 weeks

## 2025-01-27 NOTE — PROGRESS NOTE ADULT - SUBJECTIVE AND OBJECTIVE BOX
----------Daily Progress Note----------    Neurology Progress and Transfer Acceptance Note    INTERVAL HOSPITAL COURSE / OVERNIGHT EVENTS:  No overnight events, reports still having difficulty walking, denies any new issues since being admitted    <<<<<PAST MEDICAL & SURGICAL HISTORY>>>>>  HTN (hypertension)    Cirrhosis    CAD (coronary artery disease)    HLD (hyperlipidemia)    T2DM (type 2 diabetes mellitus)    BPH (benign prostatic hyperplasia)    No significant past surgical history      ALLERGIES  No Known Allergies      Home Medications:  Albuterol (Eqv-ProAir HFA) 90 mcg/inh inhalation aerosol: 2 puff(s) inhaled every 6 hours as needed for  shortness of breath and/or wheezing (11 Oct 2024 04:55)  Aspirin EC 81 mg oral delayed release tablet: 1 tab(s) orally once a day (11 Oct 2024 04:55)  finasteride 5 mg oral tablet: 1 tab(s) orally once a day (at bedtime) (11 Oct 2024 04:55)  gabapentin 300 mg oral capsule: 1 cap(s) orally 3 times a day (11 Oct 2024 04:55)  metFORMIN 500 mg oral tablet: 1 tab(s) orally 2 times a day (11 Oct 2024 04:55)  Ozempic 2 mg/1.5 mL (0.25 mg or 0.5 mg dose) subcutaneous solution: 1 milligram(s) subcutaneously (11 Oct 2024 04:55)  tamsulosin 0.4 mg oral capsule: 1 cap(s) orally once a day (11 Oct 2024 04:55)        MEDICATIONS  STANDING MEDICATIONS  aspirin  chewable 81 milliGRAM(s) Oral daily  carbidopa/levodopa  25/100 1 Tablet(s) Oral every 8 hours  dextrose 5%. 1000 milliLiter(s) IV Continuous <Continuous>  dextrose 5%. 1000 milliLiter(s) IV Continuous <Continuous>  dextrose 50% Injectable 25 Gram(s) IV Push once  dextrose 50% Injectable 12.5 Gram(s) IV Push once  dextrose 50% Injectable 25 Gram(s) IV Push once  enoxaparin Injectable 90 milliGRAM(s) SubCutaneous once  finasteride 5 milliGRAM(s) Oral at bedtime  furosemide    Tablet 40 milliGRAM(s) Oral every 24 hours  glucagon  Injectable 1 milliGRAM(s) IntraMuscular once  insulin lispro (ADMELOG) corrective regimen sliding scale   SubCutaneous Before meals and at bedtime  polyethylene glycol 3350 17 Gram(s) Oral daily  senna 2 Tablet(s) Oral at bedtime  tamsulosin 0.4 milliGRAM(s) Oral at bedtime    PRN MEDICATIONS  acetaminophen     Tablet .. 650 milliGRAM(s) Oral every 6 hours PRN  aluminum hydroxide/magnesium hydroxide/simethicone Suspension 30 milliLiter(s) Oral every 4 hours PRN  dextrose Oral Gel 15 Gram(s) Oral once PRN  melatonin 3 milliGRAM(s) Oral at bedtime PRN  ondansetron Injectable 4 milliGRAM(s) IV Push every 8 hours PRN    VITALS:  T(F): 98.2  HR: 81  BP: 127/75  RR: 18  SpO2: 96%    <<<<<NEURO EXAM>>>>>  General: Appearance is consistent with chronologic age. No abnormal facies. Gross skin survey within normal limits.    Cognitive/Language:  The patient is oriented to person, place, time and date. Recent and remote memory intact. Occasionally dosed off while I was in the middle of talking to him.  Language with normal repetition, comprehension and naming. Nondysarthric. Had some hypophonia  Eyes: VFF. BL exotropia, had delayed halting saccades but otherwise, EOMI. PERRL. No ptosis/weakness of eyelid closure.    Face:  Facial sensation normal V1 - 3, no facial asymmetry. Severe hypomimia.  Ears/Nose/Throat: Hearing grossly intact b/l. Palate elevates midline. Tongue and uvula midline.   Motor examination: Cogwheel rigidity in BL UE worse in RUE compared to LUE, Has BL UE intentional and postural tremors.   Strength Exam (MRC scale): 4+/5 BL UE strength, 3/5 BL hip flexion, 4/5 BL knee extension and flexion, 4+/5 BL dorsiflexion and plantarflexion  Reflexes: 2+ b/l biceps, triceps, brachioradialis, and Achilles reflexes, 3+ patellar reflexes with crossed adductors  Sensory examination: intact and symmetric to light touch, vibration, and temperature in BL UE, decreased to vibration, light touch, and temperature at bilateral feet, with sensation increasing proximally, with LLE worse than RLE  Cerebellum: FTN intact with intentional tremor. Has decrement of rapid movement of LE equal bilaterally, with decrement of rapid movement and rapid alternating movement in UE worse on RUE compared to LUE.  Gait: Patient required assistance of examiner and fiance to stand and was unable to take a step forward    <<<<<LABS>>>>>                        12.5   5.95  )-----------( 312      ( 27 Jan 2025 05:30 )             41.1     01-27    138  |  102  |  8   ----------------------------<  92  4.0   |  27  |  0.60    Ca    9.0      27 Jan 2025 05:30  Phos  3.2     01-27  Mg     1.9     01-27      PT/INR - ( 27 Jan 2025 05:30 )   PT: 13.9 sec;   INR: 1.21          PTT - ( 27 Jan 2025 12:00 )  PTT:69.5 sec  Urinalysis Basic - ( 27 Jan 2025 05:30 )    Color: x / Appearance: x / SG: x / pH: x  Gluc: 92 mg/dL / Ketone: x  / Bili: x / Urobili: x   Blood: x / Protein: x / Nitrite: x   Leuk Esterase: x / RBC: x / WBC x   Sq Epi: x / Non Sq Epi: x / Bacteria: x          [id number]        <<<<<RADIOLOGY>>>>>        -----------------------------------------------------------------------------------------------------------------------------------------------------------------------------------------------

## 2025-01-27 NOTE — DISCHARGE NOTE PROVIDER - PROVIDER TOKENS
PROVIDER:[TOKEN:[[id number]],FOLLOWUP:[2 weeks]] PROVIDER:[TOKEN:[[id number]],FOLLOWUP:[2 weeks]],PROVIDER:[TOKEN:[[id number]],FOLLOWUP:[2 weeks],ESTABLISHEDPATIENT:[T]]

## 2025-01-27 NOTE — PROGRESS NOTE ADULT - PROBLEM SELECTOR PLAN 3
w/ RH strain. Dx 10/2024, likely provoked PE from immobilization and recent hospitalization. Also w/ +DVT study at that time. Compliant w/ Eliquis, last dose: 01/23        - Per outpt pulm notes, considered d/c Eliquis for fall risk    Plan:  - Start Heparin ggt for full AC  - Holding home Eliquis for LP on Monday  - Outpt pulm f/u (Dr. Watts) w/ RH strain. Dx 10/2024, likely provoked PE from immobilization and recent hospitalization. Also w/ +DVT study at that time. Compliant w/ Eliquis, last dose: 01/23        - Per outpt pulm notes, considered d/c Eliquis for fall risk    Plan:  - Lovenox full AC as above  - Holding home Eliquis   - Outpt pulm f/u (Dr. Watts)

## 2025-01-27 NOTE — PROGRESS NOTE ADULT - PROBLEM SELECTOR PLAN 7
Plan:  - C/w compression stockings  - Elevate legs  - Diuretics as above - Diuretics as above  - C/w compression stockings, Elevate legs

## 2025-01-27 NOTE — PROGRESS NOTE ADULT - PROBLEM SELECTOR PLAN 8
Plan:  - Start Senna and Miralax  - Routine abd exams  - Ensure pt is passing gas, monitor BMs

## 2025-01-27 NOTE — DISCHARGE NOTE NURSING/CASE MANAGEMENT/SOCIAL WORK - PATIENT PORTAL LINK FT
You can access the FollowMyHealth Patient Portal offered by [facility] by registering at the following website: [email or web address]. By joining [employer]’s FollowMyHealth portal, you will also be able to view your health information using other applications (apps) compatible with our system.

## 2025-01-27 NOTE — PROGRESS NOTE ADULT - PROBLEM SELECTOR PROBLEM 2
Medicare Certification - Add your Attestation 1. Review the Certification Documentation below 2. Click 'QuickNote' on your toolbar 3. Add P MEDICARE CERTIFICATION-[facility] as a recipient 4. Add your attestation using [email or web address] and choose Rehab Services Attestation - Physician (at the bottom) 5. Click 'Save as QuickAction' and name the Button '[facility] Rehab Cert.' Click 'Accept.' This is a onetime set up. You will now have a [facility] Rehab Cert button on the right side of the inbasket toolbar so the next time you need to add your attestation just, click the button. You will not have to go through any other steps. 6. Click 'Sign and Route'
Right-sided heart failure

## 2025-01-27 NOTE — SWALLOW VFSS/MBS ASSESSMENT ADULT - UNSUCCESSFUL STRATEGIES TRIALED DURING PROCEDURE
chin tuck/hard swallow/nonproductive volitional cough following clinician cue/productive volitional cough following clinician cue

## 2025-01-27 NOTE — PROGRESS NOTE ADULT - PROBLEM SELECTOR PLAN 9
F: ---  E: Maintain K >4, Mg >2  N: CC soft/bite sized  VTE ppx: Heparin (full AC)  Dispo: F F: ---  E: Maintain K >4, Mg >2  N: CC soft/bite sized, NPO midnight  VTE ppx: Lovenox  Dispo: [facility]

## 2025-01-27 NOTE — DISCHARGE NOTE NURSING/CASE MANAGEMENT/SOCIAL WORK - NSDCPEFALRISK_GEN_ALL_CORE
For information on Fall & Injury Prevention, visit: [email or web address] OR  [email or web address] OR  https://www.cdc.gov/steadi/patient.html

## 2025-01-27 NOTE — SWALLOW VFSS/MBS ASSESSMENT ADULT - DIAGNOSTIC IMPRESSIONS
Pt presents with a moderate bonita-pharyngeal dysphagia with impact to safety and efficiency of the swallow.

## 2025-01-28 ENCOUNTER — NON-APPOINTMENT (OUTPATIENT)
Age: 80
End: 2025-01-28

## 2025-01-28 VITALS — SYSTOLIC BLOOD PRESSURE: 118 MMHG | HEART RATE: 94 BPM | DIASTOLIC BLOOD PRESSURE: 73 MMHG

## 2025-01-28 LAB
ALBUMIN SERPL ELPH-MCNC: 3.4 G/DL — SIGNIFICANT CHANGE UP (ref 3.3–5)
ALP SERPL-CCNC: 58 U/L — SIGNIFICANT CHANGE UP (ref 40–120)
ALT FLD-CCNC: <5 U/L — LOW (ref 10–45)
ANION GAP SERPL CALC-SCNC: 10 MMOL/L — SIGNIFICANT CHANGE UP (ref 5–17)
ANION GAP SERPL CALC-SCNC: 11 MMOL/L — SIGNIFICANT CHANGE UP (ref 5–17)
AST SERPL-CCNC: 12 U/L — SIGNIFICANT CHANGE UP (ref 10–40)
BASOPHILS # BLD AUTO: 0.03 K/UL — SIGNIFICANT CHANGE UP (ref 0–0.2)
BASOPHILS NFR BLD AUTO: 0.6 % — SIGNIFICANT CHANGE UP (ref 0–2)
BILIRUB SERPL-MCNC: 0.3 MG/DL — SIGNIFICANT CHANGE UP (ref 0.2–1.2)
BUN SERPL-MCNC: 11 MG/DL — SIGNIFICANT CHANGE UP (ref 7–23)
BUN SERPL-MCNC: 11 MG/DL — SIGNIFICANT CHANGE UP (ref 7–23)
CALCIUM SERPL-MCNC: 8.7 MG/DL — SIGNIFICANT CHANGE UP (ref 8.4–10.5)
CALCIUM SERPL-MCNC: 9.2 MG/DL — SIGNIFICANT CHANGE UP (ref 8.4–10.5)
CHLORIDE SERPL-SCNC: 100 MMOL/L — SIGNIFICANT CHANGE UP (ref 96–108)
CHLORIDE SERPL-SCNC: 97 MMOL/L — SIGNIFICANT CHANGE UP (ref 96–108)
CO2 SERPL-SCNC: 28 MMOL/L — SIGNIFICANT CHANGE UP (ref 22–31)
CO2 SERPL-SCNC: 28 MMOL/L — SIGNIFICANT CHANGE UP (ref 22–31)
CREAT SERPL-MCNC: 0.62 MG/DL — SIGNIFICANT CHANGE UP (ref 0.5–1.3)
CREAT SERPL-MCNC: 0.66 MG/DL — SIGNIFICANT CHANGE UP (ref 0.5–1.3)
EGFR: 97 ML/MIN/1.73M2 — SIGNIFICANT CHANGE UP
EGFR: 98 ML/MIN/1.73M2 — SIGNIFICANT CHANGE UP
EOSINOPHIL # BLD AUTO: 0.07 K/UL — SIGNIFICANT CHANGE UP (ref 0–0.5)
EOSINOPHIL NFR BLD AUTO: 1.4 % — SIGNIFICANT CHANGE UP (ref 0–6)
GLUCOSE SERPL-MCNC: 105 MG/DL — HIGH (ref 70–99)
GLUCOSE SERPL-MCNC: 78 MG/DL — SIGNIFICANT CHANGE UP (ref 70–99)
HCT VFR BLD CALC: 40.9 % — SIGNIFICANT CHANGE UP (ref 39–50)
HGB BLD-MCNC: 12.5 G/DL — LOW (ref 13–17)
IMM GRANULOCYTES NFR BLD AUTO: 0.8 % — SIGNIFICANT CHANGE UP (ref 0–0.9)
LYMPHOCYTES # BLD AUTO: 1.42 K/UL — SIGNIFICANT CHANGE UP (ref 1–3.3)
LYMPHOCYTES # BLD AUTO: 27.7 % — SIGNIFICANT CHANGE UP (ref 13–44)
MAGNESIUM SERPL-MCNC: 1.9 MG/DL — SIGNIFICANT CHANGE UP (ref 1.6–2.6)
MAGNESIUM SERPL-MCNC: 1.9 MG/DL — SIGNIFICANT CHANGE UP (ref 1.6–2.6)
MCHC RBC-ENTMCNC: 26.5 PG — LOW (ref 27–34)
MCHC RBC-ENTMCNC: 30.6 G/DL — LOW (ref 32–36)
MCV RBC AUTO: 86.7 FL — SIGNIFICANT CHANGE UP (ref 80–100)
MONOCYTES # BLD AUTO: 0.58 K/UL — SIGNIFICANT CHANGE UP (ref 0–0.9)
MONOCYTES NFR BLD AUTO: 11.3 % — SIGNIFICANT CHANGE UP (ref 2–14)
NEUTROPHILS # BLD AUTO: 2.98 K/UL — SIGNIFICANT CHANGE UP (ref 1.8–7.4)
NEUTROPHILS NFR BLD AUTO: 58.2 % — SIGNIFICANT CHANGE UP (ref 43–77)
NRBC # BLD: 0 /100 WBCS — SIGNIFICANT CHANGE UP (ref 0–0)
NRBC BLD-RTO: 0 /100 WBCS — SIGNIFICANT CHANGE UP (ref 0–0)
PHOSPHATE SERPL-MCNC: 3.4 MG/DL — SIGNIFICANT CHANGE UP (ref 2.5–4.5)
PLATELET # BLD AUTO: 324 K/UL — SIGNIFICANT CHANGE UP (ref 150–400)
POTASSIUM SERPL-MCNC: 3.6 MMOL/L — SIGNIFICANT CHANGE UP (ref 3.5–5.3)
POTASSIUM SERPL-MCNC: 3.9 MMOL/L — SIGNIFICANT CHANGE UP (ref 3.5–5.3)
POTASSIUM SERPL-SCNC: 3.6 MMOL/L — SIGNIFICANT CHANGE UP (ref 3.5–5.3)
POTASSIUM SERPL-SCNC: 3.9 MMOL/L — SIGNIFICANT CHANGE UP (ref 3.5–5.3)
PROT SERPL-MCNC: 6.5 G/DL — SIGNIFICANT CHANGE UP (ref 6–8.3)
RBC # BLD: 4.72 M/UL — SIGNIFICANT CHANGE UP (ref 4.2–5.8)
RBC # FLD: 14.4 % — SIGNIFICANT CHANGE UP (ref 10.3–14.5)
SODIUM SERPL-SCNC: 135 MMOL/L — SIGNIFICANT CHANGE UP (ref 135–145)
SODIUM SERPL-SCNC: 139 MMOL/L — SIGNIFICANT CHANGE UP (ref 135–145)
WBC # BLD: 5.12 K/UL — SIGNIFICANT CHANGE UP (ref 3.8–10.5)
WBC # FLD AUTO: 5.12 K/UL — SIGNIFICANT CHANGE UP (ref 3.8–10.5)

## 2025-01-28 PROCEDURE — 93005 ELECTROCARDIOGRAM TRACING: CPT

## 2025-01-28 PROCEDURE — 97530 THERAPEUTIC ACTIVITIES: CPT

## 2025-01-28 PROCEDURE — 83036 HEMOGLOBIN GLYCOSYLATED A1C: CPT

## 2025-01-28 PROCEDURE — 99233 SBSQ HOSP IP/OBS HIGH 50: CPT

## 2025-01-28 PROCEDURE — 85730 THROMBOPLASTIN TIME PARTIAL: CPT

## 2025-01-28 PROCEDURE — 86901 BLOOD TYPING SEROLOGIC RH(D): CPT

## 2025-01-28 PROCEDURE — 80053 COMPREHEN METABOLIC PANEL: CPT

## 2025-01-28 PROCEDURE — 84484 ASSAY OF TROPONIN QUANT: CPT

## 2025-01-28 PROCEDURE — 99285 EMERGENCY DEPT VISIT HI MDM: CPT

## 2025-01-28 PROCEDURE — 36415 COLL VENOUS BLD VENIPUNCTURE: CPT

## 2025-01-28 PROCEDURE — 74019 RADEX ABDOMEN 2 VIEWS: CPT

## 2025-01-28 PROCEDURE — 71045 X-RAY EXAM CHEST 1 VIEW: CPT

## 2025-01-28 PROCEDURE — 85610 PROTHROMBIN TIME: CPT

## 2025-01-28 PROCEDURE — 97162 PT EVAL MOD COMPLEX 30 MIN: CPT

## 2025-01-28 PROCEDURE — 80048 BASIC METABOLIC PNL TOTAL CA: CPT

## 2025-01-28 PROCEDURE — 86850 RBC ANTIBODY SCREEN: CPT

## 2025-01-28 PROCEDURE — 74230 X-RAY XM SWLNG FUNCJ C+: CPT

## 2025-01-28 PROCEDURE — C8929: CPT

## 2025-01-28 PROCEDURE — 83735 ASSAY OF MAGNESIUM: CPT

## 2025-01-28 PROCEDURE — 84100 ASSAY OF PHOSPHORUS: CPT

## 2025-01-28 PROCEDURE — 82962 GLUCOSE BLOOD TEST: CPT

## 2025-01-28 PROCEDURE — 97116 GAIT TRAINING THERAPY: CPT

## 2025-01-28 PROCEDURE — 85025 COMPLETE CBC W/AUTO DIFF WBC: CPT

## 2025-01-28 PROCEDURE — 86900 BLOOD TYPING SEROLOGIC ABO: CPT

## 2025-01-28 PROCEDURE — 83880 ASSAY OF NATRIURETIC PEPTIDE: CPT

## 2025-01-28 PROCEDURE — 97165 OT EVAL LOW COMPLEX 30 MIN: CPT

## 2025-01-28 RX ORDER — POLYETHYLENE GLYCOL 3350 17 G/17G
17 POWDER, FOR SOLUTION ORAL
Qty: 510 | Refills: 0
Start: 2025-01-28 | End: 2025-02-26

## 2025-01-28 RX ORDER — APREMILAST 30 MG/1
1 TABLET, FILM COATED ORAL
Qty: 0 | Refills: 0 | DISCHARGE

## 2025-01-28 RX ORDER — CARBIDOPA/LEVODOPA 10MG-100MG
1 TABLET ORAL
Qty: 0 | Refills: 0 | DISCHARGE
Start: 2025-01-28

## 2025-01-28 RX ORDER — SENNOSIDES 8.6 MG
2 TABLET ORAL
Qty: 60 | Refills: 0
Start: 2025-01-28 | End: 2025-02-26

## 2025-01-28 RX ADMIN — ENOXAPARIN SODIUM 90 MILLIGRAM(S): 100 INJECTION SUBCUTANEOUS at 18:15

## 2025-01-28 RX ADMIN — Medication 1 TABLET(S): at 13:08

## 2025-01-28 RX ADMIN — Medication 1 TABLET(S): at 05:31

## 2025-01-28 RX ADMIN — ENOXAPARIN SODIUM 90 MILLIGRAM(S): 100 INJECTION SUBCUTANEOUS at 05:27

## 2025-01-28 RX ADMIN — Medication 40 MILLIGRAM(S): at 15:12

## 2025-01-28 RX ADMIN — POLYETHYLENE GLYCOL 3350 17 GRAM(S): 17 POWDER, FOR SOLUTION ORAL at 11:56

## 2025-01-28 NOTE — PROGRESS NOTE ADULT - TIME BILLING
Bedside exam and interview   Reviewed vitals, labs   Discussed patient's plan of care with primary  Documentation of encounter  Excludes teaching and separately reported services
Bedside exam and interview   Reviewed vitals, labs   Discussed patient's plan of care with housestaff   Documentation of encounter  Excludes teaching and separately reported services
Bedside exam and interview   Reviewed vitals, labs   Discussed patient's plan of care with housestaff   Documentation of encounter  Excludes teaching and separately reported services
Bedside exam and interview    Review of hospital course, labs, vitals, imaging ,  medical records.   Reviewing outside records   Discharge planning on Interdisciplinary rounds   Discussion with consultants and Primary team   Documenting the encounter.

## 2025-01-28 NOTE — PROGRESS NOTE ADULT - SUBJECTIVE AND OBJECTIVE BOX
Patient is a 79y old  Male who presents with a chief complaint of weakness, difficulty ambulating (27 Jan 2025 16:16)        SUBJECTIVE:  Patient was seen and examined at bedside, no acute complaints    Overnight Events : NAEON        Review of systems: 12 point Review of systems negative unless otherwise documented elsewhere in note.     Diet, Pureed:   Mildly Thick Liquids (MILDTHICKLIQS) (01-27-25 @ 15:43) [Active]      MEDICATIONS:  MEDICATIONS  (STANDING):  carbidopa/levodopa  25/100 1 Tablet(s) Oral every 8 hours  dextrose 5%. 1000 milliLiter(s) (100 mL/Hr) IV Continuous <Continuous>  dextrose 5%. 1000 milliLiter(s) (50 mL/Hr) IV Continuous <Continuous>  dextrose 50% Injectable 25 Gram(s) IV Push once  dextrose 50% Injectable 12.5 Gram(s) IV Push once  dextrose 50% Injectable 25 Gram(s) IV Push once  enoxaparin Injectable 90 milliGRAM(s) SubCutaneous every 12 hours  finasteride 5 milliGRAM(s) Oral at bedtime  furosemide    Tablet 40 milliGRAM(s) Oral every 24 hours  glucagon  Injectable 1 milliGRAM(s) IntraMuscular once  insulin lispro (ADMELOG) corrective regimen sliding scale   SubCutaneous Before meals and at bedtime  polyethylene glycol 3350 17 Gram(s) Oral daily  senna 2 Tablet(s) Oral at bedtime  tamsulosin 0.4 milliGRAM(s) Oral at bedtime    MEDICATIONS  (PRN):  acetaminophen     Tablet .. 650 milliGRAM(s) Oral every 6 hours PRN Temp greater or equal to 38C (100.4F), Mild Pain (1 - 3)  aluminum hydroxide/magnesium hydroxide/simethicone Suspension 30 milliLiter(s) Oral every 4 hours PRN Dyspepsia  dextrose Oral Gel 15 Gram(s) Oral once PRN Blood Glucose LESS THAN 70 milliGRAM(s)/deciliter  melatonin 3 milliGRAM(s) Oral at bedtime PRN Insomnia  ondansetron Injectable 4 milliGRAM(s) IV Push every 8 hours PRN Nausea and/or Vomiting      Allergies    No Known Allergies    Intolerances        OBJECTIVE:  Vital Signs Last 24 Hrs  T(C): 36.4 (28 Jan 2025 05:50), Max: 36.8 (27 Jan 2025 21:08)  T(F): 97.5 (28 Jan 2025 05:50), Max: 98.2 (27 Jan 2025 21:08)  HR: 78 (28 Jan 2025 05:50) (78 - 100)  BP: 125/67 (28 Jan 2025 05:50) (114/71 - 125/67)  BP(mean): 84 (27 Jan 2025 18:10) (84 - 84)  RR: 18 (28 Jan 2025 05:50) (18 - 18)  SpO2: 97% (28 Jan 2025 05:50) (97% - 97%)    Parameters below as of 28 Jan 2025 05:50  Patient On (Oxygen Delivery Method): room air      I&O's Summary      PHYSICAL EXAM:  Gen: Resting in bed at time of exam, not in distress   CV: RRR, +S1/S2, no JVD  Pulm: no wheezing , no crackles  no increase in work of breathing  Abd: soft, NTND  Ext: 1+ bilateral edema to upper calves, per family bedside, improved fr prior  Psych: affect and behavior appropriate, pleasant at time of interview    LABS:                        12.5   5.12  )-----------( 324      ( 28 Jan 2025 05:30 )             40.9     01-28    139  |  100  |  11  ----------------------------<  78  3.6   |  28  |  0.62    Ca    9.2      28 Jan 2025 05:30  Phos  3.4     01-28  Mg     1.9     01-28    TPro  6.5  /  Alb  3.4  /  TBili  0.3  /  DBili  x   /  AST  12  /  ALT  <5[L]  /  AlkPhos  58  01-28    LIVER FUNCTIONS - ( 28 Jan 2025 05:30 )  Alb: 3.4 g/dL / Pro: 6.5 g/dL / ALK PHOS: 58 U/L / ALT: <5 U/L / AST: 12 U/L / GGT: x           PT/INR - ( 27 Jan 2025 05:30 )   PT: 13.9 sec;   INR: 1.21          PTT - ( 27 Jan 2025 12:00 )  PTT:69.5 sec  CAPILLARY BLOOD GLUCOSE      POCT Blood Glucose.: 79 mg/dL (28 Jan 2025 10:12)  POCT Blood Glucose.: 69 mg/dL (28 Jan 2025 09:35)  POCT Blood Glucose.: 68 mg/dL (28 Jan 2025 09:09)  POCT Blood Glucose.: 134 mg/dL (27 Jan 2025 21:33)  POCT Blood Glucose.: 95 mg/dL (27 Jan 2025 18:00)  POCT Blood Glucose.: 150 mg/dL (27 Jan 2025 13:32)    Urinalysis Basic - ( 28 Jan 2025 05:30 )    Color: x / Appearance: x / SG: x / pH: x  Gluc: 78 mg/dL / Ketone: x  / Bili: x / Urobili: x   Blood: x / Protein: x / Nitrite: x   Leuk Esterase: x / RBC: x / WBC x   Sq Epi: x / Non Sq Epi: x / Bacteria: x        MICRODATA:      RADIOLOGY/OTHER STUDIES:

## 2025-01-28 NOTE — PROGRESS NOTE ADULT - REASON FOR ADMISSION
weakness, difficulty ambulating

## 2025-01-28 NOTE — PROGRESS NOTE ADULT - PROVIDER SPECIALTY LIST ADULT
Internal Medicine
Neurology
Neurology
Internal Medicine
Rehab Medicine
Hospitalist
Internal Medicine

## 2025-01-28 NOTE — PROGRESS NOTE ADULT - ASSESSMENT
I M  79 y o M PMH HTN, CAD, DM2, BPH, peripheral neuropathy, spinal stenosis, PE w/ RHS (dx 10/2024, on Eliquis), ileus/colonic distention, presented per outpt neurologist (Dr. Haji) for subacute, progressive gait decline, found w/ RH strain on echo, admitted for LP and RHS eval.     Problem/Plan - 1:  ·  Problem: Unstable gait.   ·  Plan: Likely 2/2 Parkinson's, given bradykinesia, hypomimia, and cogwheel rigidity. C/f concurrent AI process given uncharacteristically rapid decline over past 1y. Failed bedside LP in ED, will pursue IR-guided LP to r/o AI vs paraneoplastic process.     Plan:   - Plan for IR-guided LP on Monday (01/17)       - Holding Eliquis for procedure (last dose: 01/23)       - Heparin ggt for full AC until procedure  - Resume home Carbidopa-Levodopa 25-100mg TID  - Neuro on board  - SLP on board given recent dysphagia w/ solids and liquids.    Problem/Plan - 2:  ·  Problem: Right-sided heart failure.   ·  Plan: Seen on admission TTE. W/ +hepatojugular reflex, and +4 LE pitting edema that has worsened in recent weeks. Will aim for euvolemia prior to considering repeat CTPE for AC failure / partially resolved embolus r/o. Pt w/o sx of PE (i.e. hypoxia, tachycardia) on admission..       - TTE 01/24: LVEF 55-60%, no regional wall motion abnormalities, abnormal septal motion due to RV overload. Mildly reduced RV function. RA dilation, normal LV size. PASP 49.   sp 1x dose iv lasix in ed    Plan:  - continue home lasix 40mg po qd  - q6h bladder scans  - Strict I/O, daily weights.    Problem/Plan - 3:  ·  Problem: History of pulmonary embolism.   ·  Plan: w/ RH strain. Dx 10/2024, likely provoked PE from immobilization and recent hospitalization. Also w/ +DVT study at that time. Compliant w/ Eliquis, last dose: 01/23        - Per outpt pulm notes, considered d/c Eliquis for fall risk    Plan:  - Start Heparin ggt for full AC  - Holding home Eliquis for LP on Monday  - Outpt pulm f/u (Dr. Watts).    Problem/Plan - 4:  ·  Problem: History of CAD (coronary artery disease).   ·  Plan: Plan:  - C/w home ASA  - Holding home Otezla 30mg qd (non-form).    Problem/Plan - 5:  ·  Problem: History of BPH.   ·  Plan: Plan:  - Resume home tamsulosin 0.4mg qHS  - Resume home finasteride 5mg qHS.    Problem/Plan - 6:  ·  Problem: Type II diabetes mellitus.   ·  Plan: Plan:  - C/w mISS  - F/u A1c  - Consistent carb diet   - Holding home metformin 500mg qd  - Holding home Ozempic.    Problem/Plan - 7:  ·  Problem: Chronic venous stasis.   ·  Plan: Plan:  - C/w compression stockings  - Elevate legs  - Diuretics as above.    Problem/Plan - 8:  ·  Problem: Colonic pseudoobstruction.   ·  Plan: Plan:  - Start Senna and Miralax  - Routine abd exams  - Ensure pt is passing gas, monitor BMs.    Problem/Plan - 9:  ·  Problem: Prophylactic measure.   ·  Plan: F: ---  E: Maintain K >4, Mg >2  N: CC soft/bite sized  VTE ppx: Heparin (full AC)  Dispo: [facility].  
79M PMH HTN, CAD, DM2, BPH, peripheral neuropathy, spinal stenosis, PE w/ RHS (dx 10/2024, on Eliquis), ileus/colonic distention, presented per outpt neurologist (Dr. Haji) for subacute, progressive gait decline, found w/ RH strain on echo, admitted for LP and RHS eval.     # Unstable gait.   Likely 2/2 Parkinson's, given bradykinesia, hypomimia, and cogwheel rigidity. C/f concurrent AI process given uncharacteristically rapid decline over past 1y. Failed bedside LP in ED, will pursue IR-guided LP to r/o AI vs paraneoplastic process.   - per neuro team  - SLP on board given recent dysphagia w/ solids and liquids, f/u barium swallow done 1/27.    # Right-sided heart failure.   Seen on admission TTE. W/ +hepatojugular reflex, and +4 LE pitting edema that has worsened in recent weeks. Will aim for euvolemia  - TTE 01/24: LVEF 55-60%, no regional wall motion abnormalities, abnormal septal motion due to RV overload. Mildly reduced RV function. RA dilation, normal LV size. PASP 49.   - sp doses of IV lasix 40 on top of home PO lasix 40 regimen.   - 1/27 BNP 53 (previously 150 in Oct 2024)  - volume exam improved today  - continue home lasix 40mg po qd  - extra dose of IV lasix 40mg today 1/28  - Strict I/O, daily weights.    # History of pulmonary embolism w/ RH strain.   Dx 10/2024, likely provoked PE from immobilization and recent hospitalization. Also w/ +DVT study at that time. Compliant w/ Eliquis, last dose: 01/23   - Lovenox full AC as above  - Holding home Eliquis   - Outpt pulm f/u (Dr. Watts).    # History of CAD (coronary artery disease).   - C/w home ASA  - Holding home Otezla 30mg qd (non-form).    # History of BPH.   - c/w home tamsulosin 0.4mg qHS and finasteride 5mg qHS.    # Type II diabetes mellitus.   A1c 6.3, home meds: metformin 500mg qd, Ozempic  - C/w mISS  - Consistent carb diet.    # Chronic venous stasis.   - Diuretics as above  - C/w compression stockings, Elevate legs.    # Colonic pseudoobstruction.   - Start Senna and Miralax  - Routine abd exams  - Ensure pt is passing gas, monitor BMs.  
Patient is a 80 yo M w/w/PMH HTN, CAD, DM2, BPH, peripheral neuropathy, spinal stenosis, and PE (dx 10/2024, on Eliquis) presenting for subacute, progressive parkinsonism with increasing falls and worsening gait, with exam notable for asymmetric bradykinesia, decrement of movement, and hypomimia, with outpatient Mine Scan showing no activity in bilateral striata, now admitted for diagnostic lumbar puncture to assess for possible autoimmune or paraneoplastic etiology for parkinsonism. LP was attempted twice at bedside with dry taps     #Unstable gait, parkinsonism, possibly 2/2 autoimmune etiology vs primary parkinson's disease  - Holding Eliquis for procedure (last dose: 01/23)  - Lovenox 90mg BID, hold 24 hrs prior to procedure  - c/w home Carbidopa-Levodopa 25-100mg TID  - SLP on board given recent dysphagia w/ solids and liquids, barium swallow done 1/27, recommended puree with mildly thick liquids via small isolated sips  - IR-guided LP to be done outpatient    #Right-sided heart failure (seen on 1/24/25 TTE)  #PE (diagnosed on 10/2024)  - Lovenox full AC as above  - Holding home Eliquis   - Outpt pulm f/u (Dr. Watts).  - continue home lasix 40mg po qd  - extra dose of IV lasix 40mg today 1/27  - Strict I/O, daily weights    #History of CAD (coronary artery disease)  - HOLD home ASA for LP, restart after LP  - Holding home Otezla 30mg qd (non-form).    #History of BPH  - c/w home tamsulosin 0.4mg qHS and finasteride 5mg qHS.    #Type II DM  - A1c 6.3  - C/w mISS    #Colonic pseudoobstruction  - Senna and Miralax  - Routine abd exams  - Monitor BMs    #MISC  - DVT PPx: Lovenox BID  - GI PPx:   - Diet: puree with mildly thick liquids
79M PMH HTN, CAD, DM2, BPH, peripheral neuropathy, spinal stenosis, PE w/ RHS (dx 10/2024, on Eliquis), ileus/colonic distention, presented per outpt neurologist (Dr. Haji) for subacute, progressive gait decline, found w/ RH strain on echo, admitted for LP and RHS eval. 
Patient is a 80 yo M w/w/PMH HTN, CAD, DM2, BPH, peripheral neuropathy, spinal stenosis, and PE (dx 10/2024, on Eliquis) presenting for subacute, progressive parkinsonism with increasing falls and worsening gait, with exam notable for asymmetric bradykinesia, decrement of movement, and hypomimia, with outpatient Mine Scan showing no activity in bilateral striata, now admitted for diagnostic lumbar puncture to assess for possible autoimmune or paraneoplastic etiology for parkinsonism. LP was attempted twice at bedside with dry taps     #Unstable gait, parkinsonism, possibly 2/2 autoimmune etiology vs primary parkinson's disease  - pending IR-guided LP, possibly Tuesday 1/28  - Holding Eliquis for procedure (last dose: 01/23)  - Lovenox 90mg BID, hold 24 hrs prior to procedure  - c/w home Carbidopa-Levodopa 25-100mg TID  - SLP on board given recent dysphagia w/ solids and liquids, barium swallow done 1/27, recommended puree with mildly thick liquids via small isolated sips    #Right-sided heart failure (seen on 1/24/25 TTE)  #PE (diagnosed on 10/2024)  - Lovenox full AC as above  - Holding home Eliquis   - Outpt pulm f/u (Dr. Watts).  - continue home lasix 40mg po qd  - extra dose of IV lasix 40mg today 1/27  - Strict I/O, daily weights    #History of CAD (coronary artery disease)  - HOLD home ASA for LP, restart after LP  - Holding home Otezla 30mg qd (non-form).    #History of BPH  - c/w home tamsulosin 0.4mg qHS and finasteride 5mg qHS.    #Type II DM  - A1c 6.3  - C/w mISS    #Colonic pseudoobstruction  - Senna and Miralax  - Routine abd exams  - Monitor BMs    #MISC  - DVT PPx: Lovenox BID  - GI PPx:   - Diet: puree with mildly thick liquids
79M PMH HTN, CAD, DM2, BPH, peripheral neuropathy, spinal stenosis, PE w/ RHS (dx 10/2024, on Eliquis), ileus/colonic distention, presented per outpt neurologist (Dr. Haji) for subacute, progressive gait decline, found w/ RH strain on echo, admitted for LP and RHS eval. 
79M PMH HTN, CAD, DM2, BPH, peripheral neuropathy, spinal stenosis, PE w/ RHS (dx 10/2024, on Eliquis), ileus/colonic distention, presented per outpt neurologist (Dr. Haji) for subacute, progressive gait decline, found w/ RH strain on echo, admitted for LP and RHS eval.

## 2025-01-28 NOTE — PROGRESS NOTE ADULT - SUBJECTIVE AND OBJECTIVE BOX
----------Daily Progress Note----------    Neurology Progress Note    INTERVAL HOSPITAL COURSE / OVERNIGHT EVENTS:  No overnight events, denies any new issues, endorses 1 BM yesterday, wishes to go home    <<<<<PAST MEDICAL & SURGICAL HISTORY>>>>>  HTN (hypertension)    Cirrhosis    CAD (coronary artery disease)    HLD (hyperlipidemia)    T2DM (type 2 diabetes mellitus)    BPH (benign prostatic hyperplasia)    No significant past surgical history      ALLERGIES  No Known Allergies      Home Medications:  Albuterol (Eqv-ProAir HFA) 90 mcg/inh inhalation aerosol: 2 puff(s) inhaled every 6 hours as needed for  shortness of breath and/or wheezing (11 Oct 2024 04:55)  Aspirin EC 81 mg oral delayed release tablet: 1 tab(s) orally once a day (11 Oct 2024 04:55)  carbidopa-levodopa 25 mg-100 mg oral tablet: 1 tab(s) orally every 8 hours (28 Jan 2025 19:04)  finasteride 5 mg oral tablet: 1 tab(s) orally once a day (at bedtime) (11 Oct 2024 04:55)  metFORMIN 500 mg oral tablet: 1 tab(s) orally 2 times a day (11 Oct 2024 04:55)  Otezla 30 mg oral tablet: 1 tab(s) orally once a day (28 Jan 2025 19:04)  Ozempic 2 mg/1.5 mL (0.25 mg or 0.5 mg dose) subcutaneous solution: 1 milligram(s) subcutaneously (11 Oct 2024 04:55)  tamsulosin 0.4 mg oral capsule: 1 cap(s) orally once a day (11 Oct 2024 04:55)        MEDICATIONS  STANDING MEDICATIONS  carbidopa/levodopa  25/100 1 Tablet(s) Oral every 8 hours  dextrose 5%. 1000 milliLiter(s) IV Continuous <Continuous>  dextrose 5%. 1000 milliLiter(s) IV Continuous <Continuous>  dextrose 50% Injectable 25 Gram(s) IV Push once  dextrose 50% Injectable 12.5 Gram(s) IV Push once  dextrose 50% Injectable 25 Gram(s) IV Push once  enoxaparin Injectable 90 milliGRAM(s) SubCutaneous every 12 hours  finasteride 5 milliGRAM(s) Oral at bedtime  furosemide    Tablet 40 milliGRAM(s) Oral every 24 hours  glucagon  Injectable 1 milliGRAM(s) IntraMuscular once  insulin lispro (ADMELOG) corrective regimen sliding scale   SubCutaneous Before meals and at bedtime  polyethylene glycol 3350 17 Gram(s) Oral daily  senna 2 Tablet(s) Oral at bedtime  tamsulosin 0.4 milliGRAM(s) Oral at bedtime    PRN MEDICATIONS  acetaminophen     Tablet .. 650 milliGRAM(s) Oral every 6 hours PRN  aluminum hydroxide/magnesium hydroxide/simethicone Suspension 30 milliLiter(s) Oral every 4 hours PRN  dextrose Oral Gel 15 Gram(s) Oral once PRN  melatonin 3 milliGRAM(s) Oral at bedtime PRN  ondansetron Injectable 4 milliGRAM(s) IV Push every 8 hours PRN    VITALS:  T(F): 97.9  HR: 94  BP: 118/73  RR: 17  SpO2: 98%    <<<<<NEURO EXAM>>>>>  General: Appearance is consistent with chronologic age. No abnormal facies. Gross skin survey within normal limits.    Cognitive/Language:  The patient is oriented to person, place, time and date. Recent and remote memory intact. Occasionally dosed off while I was in the middle of talking to him.  Language with normal repetition, comprehension and naming. Nondysarthric. Had some hypophonia  Eyes: VFF. BL exotropia, had delayed halting saccades but otherwise, EOMI. PERRL. No ptosis/weakness of eyelid closure.    Face:  Facial sensation normal V1 - 3, no facial asymmetry. Severe hypomimia.  Ears/Nose/Throat: Hearing grossly intact b/l. Palate elevates midline. Tongue and uvula midline.   Motor examination: Cogwheel rigidity in BL UE worse in RUE compared to LUE, Has BL UE intentional and postural tremors.   Strength Exam (MRC scale): 4+/5 BL UE strength, 3/5 BL hip flexion, 4/5 BL knee extension and flexion, 4+/5 BL dorsiflexion and plantarflexion  Reflexes: 2+ b/l biceps, triceps, brachioradialis, and Achilles reflexes, 3+ patellar reflexes with crossed adductors  Sensory examination: intact and symmetric to light touch, vibration, and temperature in BL UE, decreased to vibration, light touch, and temperature at bilateral feet, with sensation increasing proximally, with LLE worse than RLE  Cerebellum: FTN intact with intentional tremor. Has decrement of rapid movement of LE equal bilaterally, with decrement of rapid movement and rapid alternating movement in UE worse on RUE compared to LUE.  Gait: Patient required assistance of examiner and fiance to stand and was unable to take a step forward    <<<<<LABS>>>>>                        12.5   5.12  )-----------( 324      ( 28 Jan 2025 05:30 )             40.9     01-28    135  |  97  |  11  ----------------------------<  105[H]  3.9   |  28  |  0.66    Ca    8.7      28 Jan 2025 18:51  Phos  3.4     01-28  Mg     1.9     01-28    TPro  6.5  /  Alb  3.4  /  TBili  0.3  /  DBili  x   /  AST  12  /  ALT  <5[L]  /  AlkPhos  58  01-28    PT/INR - ( 27 Jan 2025 05:30 )   PT: 13.9 sec;   INR: 1.21          PTT - ( 27 Jan 2025 12:00 )  PTT:69.5 sec  Urinalysis Basic - ( 28 Jan 2025 18:51 )    Color: x / Appearance: x / SG: x / pH: x  Gluc: 105 mg/dL / Ketone: x  / Bili: x / Urobili: x   Blood: x / Protein: x / Nitrite: x   Leuk Esterase: x / RBC: x / WBC x   Sq Epi: x / Non Sq Epi: x / Bacteria: x          [id number]      -----------------------------------------------------------------------------------------------------------------------------------------------------------------------------------------------

## 2025-01-31 ENCOUNTER — APPOINTMENT (OUTPATIENT)
Dept: MRI IMAGING | Facility: HOSPITAL | Age: 80
End: 2025-01-31

## 2025-01-31 ENCOUNTER — OUTPATIENT (OUTPATIENT)
Dept: OUTPATIENT SERVICES | Facility: HOSPITAL | Age: 80
LOS: 1 days | End: 2025-01-31
Payer: MEDICARE

## 2025-01-31 PROCEDURE — 72148 MRI LUMBAR SPINE W/O DYE: CPT

## 2025-01-31 PROCEDURE — 72146 MRI CHEST SPINE W/O DYE: CPT

## 2025-01-31 PROCEDURE — 72146 MRI CHEST SPINE W/O DYE: CPT | Mod: 26

## 2025-01-31 PROCEDURE — 72148 MRI LUMBAR SPINE W/O DYE: CPT | Mod: 26

## 2025-02-04 DIAGNOSIS — E11.42 TYPE 2 DIABETES MELLITUS WITH DIABETIC POLYNEUROPATHY: ICD-10-CM

## 2025-02-04 DIAGNOSIS — Z86.711 PERSONAL HISTORY OF PULMONARY EMBOLISM: ICD-10-CM

## 2025-02-04 DIAGNOSIS — I25.10 ATHEROSCLEROTIC HEART DISEASE OF NATIVE CORONARY ARTERY WITHOUT ANGINA PECTORIS: ICD-10-CM

## 2025-02-04 DIAGNOSIS — K74.60 UNSPECIFIED CIRRHOSIS OF LIVER: ICD-10-CM

## 2025-02-04 DIAGNOSIS — I87.8 OTHER SPECIFIED DISORDERS OF VEINS: ICD-10-CM

## 2025-02-04 DIAGNOSIS — I11.0 HYPERTENSIVE HEART DISEASE WITH HEART FAILURE: ICD-10-CM

## 2025-02-04 DIAGNOSIS — N40.0 BENIGN PROSTATIC HYPERPLASIA WITHOUT LOWER URINARY TRACT SYMPTOMS: ICD-10-CM

## 2025-02-04 DIAGNOSIS — Z79.899 OTHER LONG TERM (CURRENT) DRUG THERAPY: ICD-10-CM

## 2025-02-04 DIAGNOSIS — I50.32 CHRONIC DIASTOLIC (CONGESTIVE) HEART FAILURE: ICD-10-CM

## 2025-02-04 DIAGNOSIS — Z79.82 LONG TERM (CURRENT) USE OF ASPIRIN: ICD-10-CM

## 2025-02-04 DIAGNOSIS — R53.1 WEAKNESS: ICD-10-CM

## 2025-02-04 DIAGNOSIS — G20.B1 PARKINSON'S DISEASE WITH DYSKINESIA, WITHOUT MENTION OF FLUCTUATIONS: ICD-10-CM

## 2025-02-04 DIAGNOSIS — M48.00 SPINAL STENOSIS, SITE UNSPECIFIED: ICD-10-CM

## 2025-02-04 DIAGNOSIS — Z79.84 LONG TERM (CURRENT) USE OF ORAL HYPOGLYCEMIC DRUGS: ICD-10-CM

## 2025-02-04 DIAGNOSIS — Z79.85 LONG-TERM (CURRENT) USE OF INJECTABLE NON-INSULIN ANTIDIABETIC DRUGS: ICD-10-CM

## 2025-02-04 DIAGNOSIS — Z86.718 PERSONAL HISTORY OF OTHER VENOUS THROMBOSIS AND EMBOLISM: ICD-10-CM

## 2025-02-04 DIAGNOSIS — G62.9 POLYNEUROPATHY, UNSPECIFIED: ICD-10-CM

## 2025-02-04 DIAGNOSIS — K59.81 OGILVIE SYNDROME: ICD-10-CM

## 2025-02-04 DIAGNOSIS — Z79.01 LONG TERM (CURRENT) USE OF ANTICOAGULANTS: ICD-10-CM

## 2025-02-04 DIAGNOSIS — E78.5 HYPERLIPIDEMIA, UNSPECIFIED: ICD-10-CM

## 2025-02-04 DIAGNOSIS — I50.810 RIGHT HEART FAILURE, UNSPECIFIED: ICD-10-CM

## 2025-02-05 ENCOUNTER — APPOINTMENT (OUTPATIENT)
Dept: ORTHOPEDIC SURGERY | Facility: CLINIC | Age: 80
End: 2025-02-05
Payer: MEDICARE

## 2025-02-05 DIAGNOSIS — G95.9 DISEASE OF SPINAL CORD, UNSPECIFIED: ICD-10-CM

## 2025-02-05 DIAGNOSIS — R53.1 WEAKNESS: ICD-10-CM

## 2025-02-05 DIAGNOSIS — R20.0 ANESTHESIA OF SKIN: ICD-10-CM

## 2025-02-05 DIAGNOSIS — M48.061 SPINAL STENOSIS, LUMBAR REGION WITHOUT NEUROGENIC CLAUDICATION: ICD-10-CM

## 2025-02-05 LAB
HCT VFR BLD CALC: 38.2 %
HGB BLD-MCNC: 12.3 G/DL
MCHC RBC-ENTMCNC: 27.5 PG
MCHC RBC-ENTMCNC: 32.2 G/DL
MCV RBC AUTO: 85.5 FL
PLATELET # BLD AUTO: 353 K/UL
RBC # BLD: 4.47 M/UL
RBC # FLD: 15 %
WBC # FLD AUTO: 6.17 K/UL

## 2025-02-05 PROCEDURE — 99214 OFFICE O/P EST MOD 30 MIN: CPT

## 2025-02-06 LAB
ANION GAP SERPL CALC-SCNC: 15 MMOL/L
APTT BLD: 30.7 SEC
BUN SERPL-MCNC: 10 MG/DL
CALCIUM SERPL-MCNC: 9.9 MG/DL
CHLORIDE SERPL-SCNC: 102 MMOL/L
CO2 SERPL-SCNC: 26 MMOL/L
CREAT SERPL-MCNC: 0.64 MG/DL
EGFR: 98 ML/MIN/1.73M2
GLUCOSE SERPL-MCNC: 79 MG/DL
INR PPP: 1.34 RATIO
POTASSIUM SERPL-SCNC: 5 MMOL/L
PT BLD: 15.9 SEC
SODIUM SERPL-SCNC: 142 MMOL/L

## 2025-02-19 ENCOUNTER — OUTPATIENT (OUTPATIENT)
Dept: OUTPATIENT SERVICES | Facility: HOSPITAL | Age: 80
LOS: 1 days | End: 2025-02-19
Payer: MEDICARE

## 2025-02-19 ENCOUNTER — APPOINTMENT (OUTPATIENT)
Dept: PULMONOLOGY | Facility: CLINIC | Age: 80
End: 2025-02-19
Payer: MEDICARE

## 2025-02-19 VITALS
HEART RATE: 88 BPM | DIASTOLIC BLOOD PRESSURE: 69 MMHG | TEMPERATURE: 98.1 F | RESPIRATION RATE: 15 BRPM | BODY MASS INDEX: 29.66 KG/M2 | OXYGEN SATURATION: 98 % | HEIGHT: 67 IN | SYSTOLIC BLOOD PRESSURE: 131 MMHG | WEIGHT: 189 LBS

## 2025-02-19 DIAGNOSIS — I82.409 ACUTE EMBOLISM AND THROMBOSIS OF UNSPECIFIED DEEP VEINS OF UNSPECIFIED LOWER EXTREMITY: ICD-10-CM

## 2025-02-19 DIAGNOSIS — I26.99 OTHER PULMONARY EMBOLISM W/OUT ACUTE COR PULMONALE: ICD-10-CM

## 2025-02-19 PROCEDURE — 71046 X-RAY EXAM CHEST 2 VIEWS: CPT

## 2025-02-19 PROCEDURE — G2211 COMPLEX E/M VISIT ADD ON: CPT

## 2025-02-19 PROCEDURE — 71046 X-RAY EXAM CHEST 2 VIEWS: CPT | Mod: 26

## 2025-02-19 PROCEDURE — 99215 OFFICE O/P EST HI 40 MIN: CPT

## 2025-02-27 ENCOUNTER — RESULT REVIEW (OUTPATIENT)
Age: 80
End: 2025-02-27

## 2025-02-27 ENCOUNTER — APPOINTMENT (OUTPATIENT)
Dept: INTERVENTIONAL RADIOLOGY/VASCULAR | Facility: HOSPITAL | Age: 80
End: 2025-02-27
Payer: MEDICARE

## 2025-02-27 ENCOUNTER — OUTPATIENT (OUTPATIENT)
Dept: OUTPATIENT SERVICES | Facility: HOSPITAL | Age: 80
LOS: 1 days | End: 2025-02-27
Payer: MEDICARE

## 2025-02-27 PROCEDURE — 86592 SYPHILIS TEST NON-TREP QUAL: CPT

## 2025-02-27 PROCEDURE — 86618 LYME DISEASE ANTIBODY: CPT

## 2025-02-27 PROCEDURE — 86255 FLUORESCENT ANTIBODY SCREEN: CPT

## 2025-02-27 PROCEDURE — 62328 DX LMBR SPI PNXR W/FLUOR/CT: CPT

## 2025-02-27 PROCEDURE — 82945 GLUCOSE OTHER FLUID: CPT

## 2025-02-27 PROCEDURE — 84394 TOTAL TAU: CPT

## 2025-02-27 PROCEDURE — 82164 ANGIOTENSIN I ENZYME TEST: CPT

## 2025-02-27 PROCEDURE — 86341 ISLET CELL ANTIBODY: CPT

## 2025-02-27 PROCEDURE — 89051 BODY FLUID CELL COUNT: CPT

## 2025-02-27 PROCEDURE — 0035U NEURO CSF PRION PRTN QUAL: CPT

## 2025-02-27 PROCEDURE — 84157 ASSAY OF PROTEIN OTHER: CPT

## 2025-02-27 PROCEDURE — 83916 OLIGOCLONAL BANDS: CPT

## 2025-02-27 PROCEDURE — 36415 COLL VENOUS BLD VENIPUNCTURE: CPT

## 2025-02-27 PROCEDURE — 83873 ASSAY OF CSF PROTEIN: CPT

## 2025-02-27 PROCEDURE — 83520 IMMUNOASSAY QUANT NOS NONAB: CPT

## 2025-03-06 ENCOUNTER — APPOINTMENT (OUTPATIENT)
Dept: PULMONOLOGY | Facility: CLINIC | Age: 80
End: 2025-03-06
Payer: MEDICARE

## 2025-03-06 ENCOUNTER — APPOINTMENT (OUTPATIENT)
Dept: PULMONOLOGY | Facility: CLINIC | Age: 80
End: 2025-03-06

## 2025-03-06 PROCEDURE — 94010 BREATHING CAPACITY TEST: CPT

## 2025-03-06 PROCEDURE — 94726 PLETHYSMOGRAPHY LUNG VOLUMES: CPT

## 2025-03-06 PROCEDURE — 94618 PULMONARY STRESS TESTING: CPT

## 2025-03-06 PROCEDURE — ZZZZZ: CPT

## 2025-03-06 PROCEDURE — 94729 DIFFUSING CAPACITY: CPT

## 2025-03-11 ENCOUNTER — APPOINTMENT (OUTPATIENT)
Dept: NEUROLOGY | Age: 80
End: 2025-03-11
Payer: MEDICARE

## 2025-03-11 ENCOUNTER — NON-APPOINTMENT (OUTPATIENT)
Age: 80
End: 2025-03-11

## 2025-03-11 ENCOUNTER — APPOINTMENT (OUTPATIENT)
Facility: CLINIC | Age: 80
End: 2025-03-11
Payer: MEDICARE

## 2025-03-11 DIAGNOSIS — G20.C PARKINSONISM, UNSPECIFIED: ICD-10-CM

## 2025-03-11 DIAGNOSIS — G04.81 OTHER ENCEPHALITIS AND ENCEPHALOMYELITIS: ICD-10-CM

## 2025-03-11 PROCEDURE — G2211 COMPLEX E/M VISIT ADD ON: CPT | Mod: 93

## 2025-03-11 PROCEDURE — 99214 OFFICE O/P EST MOD 30 MIN: CPT | Mod: 93

## 2025-03-11 PROCEDURE — 99215 OFFICE O/P EST HI 40 MIN: CPT | Mod: 2W

## 2025-03-11 RX ORDER — IMMUNE GLOBULIN INFUSION (HUMAN) 100 MG/ML
1 INJECTION, SOLUTION INTRAVENOUS; SUBCUTANEOUS
Qty: 900 | Refills: 11 | Status: ACTIVE | OUTPATIENT
Start: 2025-03-11

## 2025-03-11 RX ORDER — IMMUNE GLOBULIN INFUSION (HUMAN) 100 MG/ML
1 INJECTION, SOLUTION INTRAVENOUS; SUBCUTANEOUS
Qty: 1700 | Refills: 0 | Status: ACTIVE | OUTPATIENT
Start: 2025-03-11

## 2025-03-13 LAB
CRP SERPL-MCNC: <3 MG/L
ERYTHROCYTE [SEDIMENTATION RATE] IN BLOOD BY WESTERGREN METHOD: 12 MM/HR
FOLATE SERPL-MCNC: >20 NG/ML
VIT B12 SERPL-MCNC: 502 PG/ML

## 2025-03-18 LAB
HOMOCYSTEINE LEVEL: 15.6 UMOL/L
METHYLMALONATE SERPL-SCNC: 156 NMOL/L

## 2025-03-20 ENCOUNTER — OUTPATIENT (OUTPATIENT)
Dept: OUTPATIENT SERVICES | Facility: HOSPITAL | Age: 80
LOS: 1 days | End: 2025-03-20
Payer: MEDICARE

## 2025-03-20 ENCOUNTER — APPOINTMENT (OUTPATIENT)
Dept: NUCLEAR MEDICINE | Facility: HOSPITAL | Age: 80
End: 2025-03-20

## 2025-03-20 PROCEDURE — 78582 LUNG VENTILAT&PERFUS IMAGING: CPT

## 2025-03-20 PROCEDURE — 78582 LUNG VENTILAT&PERFUS IMAGING: CPT | Mod: 26

## 2025-03-20 PROCEDURE — A9540: CPT

## 2025-03-20 PROCEDURE — A9567: CPT

## 2025-04-02 ENCOUNTER — RX RENEWAL (OUTPATIENT)
Age: 80
End: 2025-04-02

## 2025-04-02 ENCOUNTER — APPOINTMENT (OUTPATIENT)
Dept: PULMONOLOGY | Facility: CLINIC | Age: 80
End: 2025-04-02
Payer: MEDICARE

## 2025-04-02 VITALS
BODY MASS INDEX: 29.66 KG/M2 | SYSTOLIC BLOOD PRESSURE: 138 MMHG | HEART RATE: 64 BPM | WEIGHT: 189 LBS | DIASTOLIC BLOOD PRESSURE: 79 MMHG | TEMPERATURE: 98.4 F | OXYGEN SATURATION: 97 % | HEIGHT: 67 IN | RESPIRATION RATE: 12 BRPM

## 2025-04-02 DIAGNOSIS — R06.02 SHORTNESS OF BREATH: ICD-10-CM

## 2025-04-02 DIAGNOSIS — I27.21 SECONDARY PULMONARY ARTERIAL HYPERTENSION: ICD-10-CM

## 2025-04-02 DIAGNOSIS — I26.99 OTHER PULMONARY EMBOLISM W/OUT ACUTE COR PULMONALE: ICD-10-CM

## 2025-04-02 PROCEDURE — 99214 OFFICE O/P EST MOD 30 MIN: CPT

## 2025-04-02 PROCEDURE — G2211 COMPLEX E/M VISIT ADD ON: CPT

## 2025-04-02 RX ORDER — TORSEMIDE 20 MG/1
20 TABLET ORAL
Qty: 30 | Refills: 3 | Status: ACTIVE | COMMUNITY
Start: 2025-04-02 | End: 1900-01-01

## 2025-04-02 RX ORDER — DIPHENHYDRAMINE HYDROCHLORIDE 25 MG/1
25 CAPSULE ORAL
Qty: 30 | Refills: 4 | Status: ACTIVE | COMMUNITY
Start: 2025-04-02 | End: 1900-01-01

## 2025-04-03 LAB
ALBUMIN SERPL ELPH-MCNC: 3.9 G/DL
ALP BLD-CCNC: 51 U/L
ALT SERPL-CCNC: 6 U/L
ANION GAP SERPL CALC-SCNC: 9 MMOL/L
AST SERPL-CCNC: 12 U/L
BILIRUB SERPL-MCNC: 0.7 MG/DL
BUN SERPL-MCNC: 8 MG/DL
CALCIUM SERPL-MCNC: 9.5 MG/DL
CHLORIDE SERPL-SCNC: 101 MMOL/L
CO2 SERPL-SCNC: 27 MMOL/L
CREAT SERPL-MCNC: 0.71 MG/DL
EGFRCR SERPLBLD CKD-EPI 2021: 94 ML/MIN/1.73M2
ERYTHROCYTE [SEDIMENTATION RATE] IN BLOOD BY WESTERGREN METHOD: 28 MM/HR
GLUCOSE SERPL-MCNC: 87 MG/DL
HBV CORE IGG+IGM SER QL: REACTIVE
HBV SURFACE AB SER QL: REACTIVE
HBV SURFACE AG SER QL: NONREACTIVE
HCT VFR BLD CALC: 36.7 %
HCV AB SER QL: NONREACTIVE
HCV S/CO RATIO: 0.3 S/CO
HGB BLD-MCNC: 11.7 G/DL
HIV1+2 AB SPEC QL IA.RAPID: NONREACTIVE
MCHC RBC-ENTMCNC: 28.1 PG
MCHC RBC-ENTMCNC: 31.9 G/DL
MCV RBC AUTO: 88.2 FL
NT-PROBNP SERPL-MCNC: 62 PG/ML
PLATELET # BLD AUTO: 239 K/UL
POTASSIUM SERPL-SCNC: 4.3 MMOL/L
PROT SERPL-MCNC: 7.9 G/DL
RBC # BLD: 4.16 M/UL
RBC # FLD: 15.9 %
RHEUMATOID FACT SER QL: <10 IU/ML
SODIUM SERPL-SCNC: 138 MMOL/L
TSH SERPL-ACNC: 1.09 UIU/ML
WBC # FLD AUTO: 4.77 K/UL

## 2025-04-04 LAB
CENTROMERE IGG SER-ACNC: <0.2 AL
DSDNA AB SER-ACNC: 1 IU/ML
ENA SCL70 IGG SER IA-ACNC: <0.2 AL
ENA SS-A AB SER IA-ACNC: 1 AL
ENA SS-B AB SER IA-ACNC: <0.2 AL

## 2025-04-07 ENCOUNTER — OUTPATIENT (OUTPATIENT)
Dept: OUTPATIENT SERVICES | Facility: HOSPITAL | Age: 80
LOS: 1 days | End: 2025-04-07

## 2025-04-07 ENCOUNTER — APPOINTMENT (OUTPATIENT)
Dept: SLEEP CENTER | Facility: HOSPITAL | Age: 80
End: 2025-04-07

## 2025-04-07 ENCOUNTER — RX RENEWAL (OUTPATIENT)
Age: 80
End: 2025-04-07

## 2025-04-07 DIAGNOSIS — G47.33 OBSTRUCTIVE SLEEP APNEA (ADULT) (PEDIATRIC): ICD-10-CM

## 2025-04-07 LAB
ANA PAT FLD IF-IMP: ABNORMAL
ANA SER IF-ACNC: ABNORMAL

## 2025-04-07 PROCEDURE — 95810 POLYSOM 6/> YRS 4/> PARAM: CPT

## 2025-04-07 PROCEDURE — 95810 POLYSOM 6/> YRS 4/> PARAM: CPT | Mod: 26

## 2025-04-09 ENCOUNTER — APPOINTMENT (OUTPATIENT)
Dept: ORTHOPEDIC SURGERY | Facility: CLINIC | Age: 80
End: 2025-04-09

## 2025-04-09 PROCEDURE — 99214 OFFICE O/P EST MOD 30 MIN: CPT

## 2025-04-11 ENCOUNTER — APPOINTMENT (OUTPATIENT)
Dept: PULMONOLOGY | Facility: CLINIC | Age: 80
End: 2025-04-11

## 2025-04-17 ENCOUNTER — RX RENEWAL (OUTPATIENT)
Age: 80
End: 2025-04-17

## 2025-04-17 RX ORDER — IMMUNE GLOBULIN INFUSION (HUMAN) 100 MG/ML
10 INJECTION, SOLUTION INTRAVENOUS; SUBCUTANEOUS
Qty: 19 | Refills: 4 | Status: ACTIVE | COMMUNITY
Start: 2025-04-17 | End: 1900-01-01

## 2025-04-30 ENCOUNTER — EMERGENCY (EMERGENCY)
Facility: HOSPITAL | Age: 80
LOS: 1 days | End: 2025-04-30
Attending: EMERGENCY MEDICINE | Admitting: EMERGENCY MEDICINE
Payer: MEDICARE

## 2025-04-30 VITALS
RESPIRATION RATE: 16 BRPM | HEIGHT: 68 IN | WEIGHT: 188.05 LBS | HEART RATE: 80 BPM | SYSTOLIC BLOOD PRESSURE: 115 MMHG | DIASTOLIC BLOOD PRESSURE: 65 MMHG | TEMPERATURE: 98 F

## 2025-04-30 VITALS
TEMPERATURE: 98 F | DIASTOLIC BLOOD PRESSURE: 73 MMHG | HEART RATE: 65 BPM | OXYGEN SATURATION: 97 % | RESPIRATION RATE: 16 BRPM | SYSTOLIC BLOOD PRESSURE: 118 MMHG

## 2025-04-30 DIAGNOSIS — Z79.01 LONG TERM (CURRENT) USE OF ANTICOAGULANTS: ICD-10-CM
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
## 2025-04-30 DIAGNOSIS — S01.112A LACERATION WITHOUT FOREIGN BODY OF LEFT EYELID AND PERIOCULAR AREA, INITIAL ENCOUNTER: ICD-10-CM

## 2025-04-30 DIAGNOSIS — Z23 ENCOUNTER FOR IMMUNIZATION: ICD-10-CM

## 2025-04-30 DIAGNOSIS — I10 ESSENTIAL (PRIMARY) HYPERTENSION: ICD-10-CM

## 2025-04-30 DIAGNOSIS — G20.A1 PARKINSON'S DISEASE WITHOUT DYSKINESIA, WITHOUT MENTION OF FLUCTUATIONS: ICD-10-CM
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
## 2025-04-30 DIAGNOSIS — Z86.711 PERSONAL HISTORY OF PULMONARY EMBOLISM: ICD-10-CM

## 2025-04-30 DIAGNOSIS — S20.212A CONTUSION OF LEFT FRONT WALL OF THORAX, INITIAL ENCOUNTER: ICD-10-CM

## 2025-04-30 DIAGNOSIS — E11.9 TYPE 2 DIABETES MELLITUS WITHOUT COMPLICATIONS: ICD-10-CM

## 2025-04-30 PROCEDURE — 71101 X-RAY EXAM UNILAT RIBS/CHEST: CPT | Mod: 26,LT

## 2025-04-30 PROCEDURE — 99284 EMERGENCY DEPT VISIT MOD MDM: CPT | Mod: 25

## 2025-04-30 PROCEDURE — 70450 CT HEAD/BRAIN W/O DYE: CPT | Mod: MC

## 2025-04-30 PROCEDURE — 71101 X-RAY EXAM UNILAT RIBS/CHEST: CPT

## 2025-04-30 PROCEDURE — 70450 CT HEAD/BRAIN W/O DYE: CPT | Mod: 26

## 2025-04-30 PROCEDURE — 12011 RPR F/E/E/N/L/M 2.5 CM/<: CPT

## 2025-04-30 PROCEDURE — 90715 TDAP VACCINE 7 YRS/> IM: CPT

## 2025-04-30 PROCEDURE — 90471 IMMUNIZATION ADMIN: CPT

## 2025-04-30 PROCEDURE — 71250 CT THORAX DX C-: CPT | Mod: MC

## 2025-04-30 PROCEDURE — 71250 CT THORAX DX C-: CPT | Mod: 26

## 2025-04-30 RX ORDER — ACETAMINOPHEN 500 MG/5ML
650 LIQUID (ML) ORAL ONCE
Refills: 0 | Status: COMPLETED | OUTPATIENT
Start: 2025-04-30 | End: 2025-04-30

## 2025-04-30 RX ADMIN — Medication 650 MILLIGRAM(S): at 14:50

## 2025-05-01 ENCOUNTER — OUTPATIENT (OUTPATIENT)
Dept: OUTPATIENT SERVICES | Facility: HOSPITAL | Age: 80
LOS: 1 days | End: 2025-05-01
Payer: MEDICARE

## 2025-05-01 ENCOUNTER — RESULT REVIEW (OUTPATIENT)
Age: 80
End: 2025-05-01

## 2025-05-01 DIAGNOSIS — I26.99 OTHER PULMONARY EMBOLISM WITHOUT ACUTE COR PULMONALE: ICD-10-CM

## 2025-05-01 PROCEDURE — 93306 TTE W/DOPPLER COMPLETE: CPT | Mod: 26

## 2025-05-01 PROCEDURE — 93306 TTE W/DOPPLER COMPLETE: CPT

## 2025-05-02 ENCOUNTER — APPOINTMENT (OUTPATIENT)
Dept: PULMONOLOGY | Facility: CLINIC | Age: 80
End: 2025-05-02
Payer: MEDICARE

## 2025-05-02 VITALS
BODY MASS INDEX: 29.66 KG/M2 | OXYGEN SATURATION: 97 % | DIASTOLIC BLOOD PRESSURE: 74 MMHG | RESPIRATION RATE: 12 BRPM | WEIGHT: 189 LBS | SYSTOLIC BLOOD PRESSURE: 123 MMHG | HEIGHT: 67 IN | HEART RATE: 70 BPM | TEMPERATURE: 98.3 F

## 2025-05-02 DIAGNOSIS — J98.6 DISORDERS OF DIAPHRAGM: ICD-10-CM

## 2025-05-02 DIAGNOSIS — G47.33 OBSTRUCTIVE SLEEP APNEA (ADULT) (PEDIATRIC): ICD-10-CM

## 2025-05-02 DIAGNOSIS — R06.02 SHORTNESS OF BREATH: ICD-10-CM

## 2025-05-02 DIAGNOSIS — I26.99 OTHER PULMONARY EMBOLISM W/OUT ACUTE COR PULMONALE: ICD-10-CM

## 2025-05-02 PROCEDURE — 99215 OFFICE O/P EST HI 40 MIN: CPT

## 2025-05-02 PROCEDURE — G2211 COMPLEX E/M VISIT ADD ON: CPT

## 2025-05-05 ENCOUNTER — NON-APPOINTMENT (OUTPATIENT)
Age: 80
End: 2025-05-05

## 2025-05-23 ENCOUNTER — APPOINTMENT (OUTPATIENT)
Dept: NEUROLOGY | Age: 80
End: 2025-05-23
Payer: MEDICARE

## 2025-05-23 ENCOUNTER — RESULT REVIEW (OUTPATIENT)
Age: 80
End: 2025-05-23

## 2025-05-23 ENCOUNTER — NON-APPOINTMENT (OUTPATIENT)
Age: 80
End: 2025-05-23

## 2025-05-23 ENCOUNTER — OUTPATIENT (OUTPATIENT)
Dept: OUTPATIENT SERVICES | Facility: HOSPITAL | Age: 80
LOS: 1 days | End: 2025-05-23
Payer: MEDICARE

## 2025-05-23 VITALS
SYSTOLIC BLOOD PRESSURE: 112 MMHG | RESPIRATION RATE: 17 BRPM | DIASTOLIC BLOOD PRESSURE: 72 MMHG | HEIGHT: 67 IN | BODY MASS INDEX: 29.66 KG/M2 | OXYGEN SATURATION: 98 % | WEIGHT: 189 LBS | TEMPERATURE: 98.1 F | HEART RATE: 83 BPM

## 2025-05-23 DIAGNOSIS — G20.C PARKINSONISM, UNSPECIFIED: ICD-10-CM

## 2025-05-23 DIAGNOSIS — G04.81 OTHER ENCEPHALITIS AND ENCEPHALOMYELITIS: ICD-10-CM

## 2025-05-23 PROCEDURE — 99214 OFFICE O/P EST MOD 30 MIN: CPT

## 2025-05-23 PROCEDURE — 71046 X-RAY EXAM CHEST 2 VIEWS: CPT | Mod: 26

## 2025-05-23 PROCEDURE — 71046 X-RAY EXAM CHEST 2 VIEWS: CPT

## 2025-06-10 ENCOUNTER — APPOINTMENT (OUTPATIENT)
Dept: NUCLEAR MEDICINE | Facility: HOSPITAL | Age: 80
End: 2025-06-10

## 2025-06-10 ENCOUNTER — OUTPATIENT (OUTPATIENT)
Dept: OUTPATIENT SERVICES | Facility: HOSPITAL | Age: 80
LOS: 1 days | End: 2025-06-10
Payer: MEDICARE

## 2025-06-10 PROCEDURE — A9584: CPT

## 2025-06-10 PROCEDURE — 78803 RP LOCLZJ TUM SPECT 1 AREA: CPT | Mod: 26

## 2025-06-10 PROCEDURE — 78803 RP LOCLZJ TUM SPECT 1 AREA: CPT

## 2025-06-12 ENCOUNTER — NON-APPOINTMENT (OUTPATIENT)
Age: 80
End: 2025-06-12

## 2025-06-19 ENCOUNTER — OUTPATIENT (OUTPATIENT)
Dept: OUTPATIENT SERVICES | Facility: HOSPITAL | Age: 80
LOS: 1 days | End: 2025-06-19

## 2025-06-19 ENCOUNTER — APPOINTMENT (OUTPATIENT)
Dept: RADIOLOGY | Facility: HOSPITAL | Age: 80
End: 2025-06-19

## 2025-06-19 ENCOUNTER — EMERGENCY (EMERGENCY)
Facility: HOSPITAL | Age: 80
LOS: 1 days | End: 2025-06-19
Attending: STUDENT IN AN ORGANIZED HEALTH CARE EDUCATION/TRAINING PROGRAM | Admitting: STUDENT IN AN ORGANIZED HEALTH CARE EDUCATION/TRAINING PROGRAM
Payer: MEDICARE

## 2025-06-19 VITALS
RESPIRATION RATE: 18 BRPM | OXYGEN SATURATION: 96 % | SYSTOLIC BLOOD PRESSURE: 115 MMHG | HEART RATE: 63 BPM | TEMPERATURE: 98 F | DIASTOLIC BLOOD PRESSURE: 71 MMHG

## 2025-06-19 VITALS
OXYGEN SATURATION: 95 % | HEIGHT: 68 IN | HEART RATE: 76 BPM | DIASTOLIC BLOOD PRESSURE: 73 MMHG | RESPIRATION RATE: 18 BRPM | SYSTOLIC BLOOD PRESSURE: 125 MMHG | TEMPERATURE: 98 F | WEIGHT: 188.94 LBS

## 2025-06-19 DIAGNOSIS — Z79.01 LONG TERM (CURRENT) USE OF ANTICOAGULANTS: ICD-10-CM

## 2025-06-19 DIAGNOSIS — G04.81 OTHER ENCEPHALITIS AND ENCEPHALOMYELITIS: ICD-10-CM

## 2025-06-19 DIAGNOSIS — Z86.711 PERSONAL HISTORY OF PULMONARY EMBOLISM: ICD-10-CM

## 2025-06-19 DIAGNOSIS — R20.0 ANESTHESIA OF SKIN: ICD-10-CM

## 2025-06-19 DIAGNOSIS — G20.A1 PARKINSON'S DISEASE WITHOUT DYSKINESIA, WITHOUT MENTION OF FLUCTUATIONS: ICD-10-CM

## 2025-06-19 DIAGNOSIS — E11.9 TYPE 2 DIABETES MELLITUS WITHOUT COMPLICATIONS: ICD-10-CM

## 2025-06-19 DIAGNOSIS — I10 ESSENTIAL (PRIMARY) HYPERTENSION: ICD-10-CM

## 2025-06-19 DIAGNOSIS — Z79.82 LONG TERM (CURRENT) USE OF ASPIRIN: ICD-10-CM

## 2025-06-19 LAB
ANION GAP SERPL CALC-SCNC: 11 MMOL/L — SIGNIFICANT CHANGE UP (ref 5–17)
BUN SERPL-MCNC: 9 MG/DL — SIGNIFICANT CHANGE UP (ref 7–23)
CALCIUM SERPL-MCNC: 9.1 MG/DL — SIGNIFICANT CHANGE UP (ref 8.4–10.5)
CHLORIDE SERPL-SCNC: 98 MMOL/L — SIGNIFICANT CHANGE UP (ref 96–108)
CO2 SERPL-SCNC: 27 MMOL/L — SIGNIFICANT CHANGE UP (ref 22–31)
CREAT SERPL-MCNC: 0.76 MG/DL — SIGNIFICANT CHANGE UP (ref 0.5–1.3)
EGFR: 92 ML/MIN/1.73M2 — SIGNIFICANT CHANGE UP
EGFR: 92 ML/MIN/1.73M2 — SIGNIFICANT CHANGE UP
GLUCOSE SERPL-MCNC: 90 MG/DL — SIGNIFICANT CHANGE UP (ref 70–99)
HCT VFR BLD CALC: 34 % — LOW (ref 39–50)
HGB BLD-MCNC: 11.2 G/DL — LOW (ref 13–17)
MCHC RBC-ENTMCNC: 28.7 PG — SIGNIFICANT CHANGE UP (ref 27–34)
MCHC RBC-ENTMCNC: 32.9 G/DL — SIGNIFICANT CHANGE UP (ref 32–36)
MCV RBC AUTO: 87.2 FL — SIGNIFICANT CHANGE UP (ref 80–100)
NRBC # BLD AUTO: 0 K/UL — SIGNIFICANT CHANGE UP (ref 0–0)
NRBC # FLD: 0 K/UL — SIGNIFICANT CHANGE UP (ref 0–0)
NRBC BLD AUTO-RTO: 0 /100 WBCS — SIGNIFICANT CHANGE UP (ref 0–0)
PLATELET # BLD AUTO: 229 K/UL — SIGNIFICANT CHANGE UP (ref 150–400)
PMV BLD: 10.1 FL — SIGNIFICANT CHANGE UP (ref 7–13)
POTASSIUM SERPL-MCNC: 3.4 MMOL/L — LOW (ref 3.5–5.3)
POTASSIUM SERPL-SCNC: 3.4 MMOL/L — LOW (ref 3.5–5.3)
RBC # BLD: 3.9 M/UL — LOW (ref 4.2–5.8)
RBC # FLD: 15 % — HIGH (ref 10.3–14.5)
SODIUM SERPL-SCNC: 136 MMOL/L — SIGNIFICANT CHANGE UP (ref 135–145)
WBC # BLD: 4.98 K/UL — SIGNIFICANT CHANGE UP (ref 3.8–10.5)
WBC # FLD AUTO: 4.98 K/UL — SIGNIFICANT CHANGE UP (ref 3.8–10.5)

## 2025-06-19 PROCEDURE — 70498 CT ANGIOGRAPHY NECK: CPT

## 2025-06-19 PROCEDURE — 76000 FLUOROSCOPY <1 HR PHYS/QHP: CPT | Mod: 26,59

## 2025-06-19 PROCEDURE — 70498 CT ANGIOGRAPHY NECK: CPT | Mod: 26

## 2025-06-19 PROCEDURE — 80048 BASIC METABOLIC PNL TOTAL CA: CPT

## 2025-06-19 PROCEDURE — 76000 FLUOROSCOPY <1 HR PHYS/QHP: CPT

## 2025-06-19 PROCEDURE — 70496 CT ANGIOGRAPHY HEAD: CPT | Mod: 26

## 2025-06-19 PROCEDURE — 70496 CT ANGIOGRAPHY HEAD: CPT

## 2025-06-19 PROCEDURE — 99285 EMERGENCY DEPT VISIT HI MDM: CPT

## 2025-06-19 PROCEDURE — 70450 CT HEAD/BRAIN W/O DYE: CPT | Mod: 26,XU

## 2025-06-19 PROCEDURE — 99284 EMERGENCY DEPT VISIT MOD MDM: CPT | Mod: 25

## 2025-06-19 PROCEDURE — 36415 COLL VENOUS BLD VENIPUNCTURE: CPT

## 2025-06-19 PROCEDURE — 70450 CT HEAD/BRAIN W/O DYE: CPT

## 2025-06-19 PROCEDURE — 85027 COMPLETE CBC AUTOMATED: CPT

## 2025-06-19 RX ORDER — ATORVASTATIN CALCIUM 80 MG/1
1 TABLET, FILM COATED ORAL
Qty: 30 | Refills: 1
Start: 2025-06-19 | End: 2025-08-17

## 2025-06-19 NOTE — CONSULT NOTE ADULT - SUBJECTIVE AND OBJECTIVE BOX
**STROKE CONSULT NOTE**      HPI: 80y Male with PMHx of HTN, DM, PE on eliquis, Parkinsons, autoimmune encephalitis (follows with Dr Haji) presenting with 1 week of L facial numbness. Patient states numbness starting suddenly 1 week ago and has been intermittent since then, lasting about 1-2 hours every day and self-resolving. Has not experienced similar events in the past. Denies any numbness or weakness in the LUE or LLE, dysarthria, aphasia, facial droop, dizziness, visual changes, unsteadiness. Reports being compliant with aspirin and Eliquis 5mg bid at home; used to take lipitor in the past but was discontinued by PCP, does not recall why. Stroke team consulted for CTH finding of chronic lacunar infarcts b/l in gangliocapsular regions, CTA H/N neg. NIHSS 0 on examination, /73.     T(C): 36.5 (06-19-25 @ 18:35), Max: 36.7 (06-19-25 @ 16:05)  HR: 64 (06-19-25 @ 18:35) (64 - 76)  BP: 126/77 (06-19-25 @ 18:35) (125/73 - 126/77)  RR: 18 (06-19-25 @ 18:35) (18 - 18)  SpO2: 95% (06-19-25 @ 18:35) (95% - 95%)    PAST MEDICAL & SURGICAL HISTORY:  HTN (hypertension)      Cirrhosis      CAD (coronary artery disease)      HLD (hyperlipidemia)      T2DM (type 2 diabetes mellitus)      BPH (benign prostatic hyperplasia)      No significant past surgical history          FAMILY HISTORY:  No pertinent family history in first degree relatives      ROS:  Constitutional: No fever, weight loss or fatigue  Eyes: No eye pain, visual disturbances, or discharge  ENMT:  No difficulty hearing, tinnitus; No sinus or throat pain  Neck: No pain or stiffness  Respiratory: No cough, wheezing, chills or hemoptysis  Cardiovascular: No chest pain, palpitations, shortness of breath, or leg swelling  Gastrointestinal: No abdominal pain. No nausea, vomiting or hematemesis; No diarrhea or constipation. Nohematochezia.  Genitourinary: No dysuria, frequency, hematuria or incontinence  Neurological: As per HPI  Skin: No itching, burning, rashes or lesions   Endocrine: No heat or cold intolerance; No hair loss  Musculoskeletal: No joint pain or swelling; No muscle, back or extremity pain  Heme/Lymph: No easy bruising or bleeding gums    MEDICATIONS  (STANDING):    MEDICATIONS  (PRN):    Allergies    No Known Allergies    Intolerances      Vital Signs Last 24 Hrs  T(C): 36.5 (19 Jun 2025 18:35), Max: 36.7 (19 Jun 2025 16:05)  T(F): 97.7 (19 Jun 2025 18:35), Max: 98 (19 Jun 2025 16:05)  HR: 64 (19 Jun 2025 18:35) (64 - 76)  BP: 126/77 (19 Jun 2025 18:35) (125/73 - 126/77)  BP(mean): --  RR: 18 (19 Jun 2025 18:35) (18 - 18)  SpO2: 95% (19 Jun 2025 18:35) (95% - 95%)    Parameters below as of 19 Jun 2025 18:35  Patient On (Oxygen Delivery Method): room air        Physical exam:  Constitutional: No acute distress, conversant  Eyes: Anicteric sclerae, moist conjunctivae, see below for CNs  Neck: trachea midline    Neurologic:  -Mental status: Awake, alert, oriented to person, age, and month. Speech is fluent with intact naming, repetition, and comprehension, no dysarthria. Follows cross and 2 step commands.   -Cranial nerves:   II: Visual fields are full to confrontation.  III, IV, VI: Extraocular movements are intact without nystagmus. Pupils equally round and reactive to light  V:  Facial sensation V1-V3 equal and intact   VII: Face is symmetric with normal eye closure and smile  VIII: Hearing is bilaterally intact to finger rub  XII: Tongue protrudes midline  Motor: Normal bulk and tone. No pronator drift. Strength bilateral upper extremity 5/5, bilateral lower extremities 5/5.  Sensation: Intact to light touch bilaterally. No neglect or extinction on double simultaneous testing.  Coordination: No dysmetria on finger-to-nose and heel-to-shin bilaterally  Gait: Narrow gait and steady (uses walker at baseline)    NIHSS: 0    Fingerstick Blood Glucose: CAPILLARY BLOOD GLUCOSE        LABS:                        11.2   4.98  )-----------( 229      ( 19 Jun 2025 17:31 )             34.0     06-19    136  |  98  |  9   ----------------------------<  90  3.4[L]   |  27  |  0.76    Ca    9.1      19 Jun 2025 17:31            Urinalysis Basic - ( 19 Jun 2025 17:31 )    Color: x / Appearance: x / SG: x / pH: x  Gluc: 90 mg/dL / Ketone: x  / Bili: x / Urobili: x   Blood: x / Protein: x / Nitrite: x   Leuk Esterase: x / RBC: x / WBC x   Sq Epi: x / Non Sq Epi: x / Bacteria: x        RADIOLOGY & ADDITIONAL STUDIES:    CT HEAD: Stable exam. No acute intracranial hemorrhage, vasogenic edema,   extra-axial collection or hydrocephalus. Chronic findings as above. Small chronic lacunar infarcts in the bilateral   gangliocapsular regions, unchanged.     CT ANGIOGRAPHY NECK: Patent cervical vasculature.  No hemodynamically   significant carotid stenosis or flow-limiting vertebral artery stenosis.    No evidence of dissection.  Multinodular thyroid gland. This can be followed up with nonemergent   thyroid ultrasound to further characterize.    CT ANGIOGRAPHY BRAIN: No vessel occlusion, flow-limiting stenosis or   aneurysm.

## 2025-06-19 NOTE — CONSULT NOTE ADULT - ASSESSMENT
80y Male with PMHx of HTN, DM, PE on eliquis, Parkinsons, autoimmune encephalitis (follows with Dr Haji) presenting with 1 week of L facial numbness. Patient states numbness starting suddenly 1 week ago and has been intermittent since then, lasting about 1-2 hours every day and self-resolving. Has not experienced similar events in the past. Denies any numbness or weakness in the LUE or LLE, dysarthria, aphasia, facial droop, dizziness, visual changes, unsteadiness. Reports being compliant with aspirin and Eliquis 5mg bid at home; used to take lipitor in the past but was discontinued by PCP, does not recall why. Stroke team consulted for CTH finding of chronic lacunar infarcts b/l in gangliocapsular regions, CTA H/N neg. NIHSS 0 on examination, /73.     1) Secondary stroke prevention:  - continue home aspirin 81mg daily and Eliquis 5mg bid  - start lipitor 80mg at bedtime    2) Further recommendations:  - medically optimized for secondary stroke prevention  - recommend follow-up outpatient with Dr Haji    Stroke team to sign off at this time, thank you for this consult    Case discussed with Dr Fonseca

## 2025-06-19 NOTE — ED PROVIDER NOTE - PHYSICAL EXAMINATION
VITAL SIGNS: I have reviewed nursing notes and confirm.  CONSTITUTIONAL: Well appearing, in no acute distress.   SKIN:  warm and dry, no acute rash.   HEAD:  normocephalic, atraumatic.  EYES: EOM intact; conjunctiva and sclera clear.  ENT: No nasal discharge; airway clear.   NECK: Supple.  CARD: S1, S2, Regular rate and rhythm.   RESP:  Clear to auscultation b/l, no wheezes, rales or rhonchi.  ABD: Normal bowel sounds; soft; non-distended; non-tender; no guarding/ rebound.  EXT: Normal ROM. No peripheral edema. Pulses intact and equal b/l.  NEURO: A&Ox3 to name, place, year. CN II-XII intact. No facial droop. Fluent speech with expression and comprehension. 5/5 strength in b/l UE and LE. Symmetrical sensation to light touch to both sides of face, and proximal/distal b/l UE and LE. no pronator drift. Intact F2N. Steady gait.

## 2025-06-19 NOTE — ED PROVIDER NOTE - CLINICAL SUMMARY MEDICAL DECISION MAKING FREE TEXT BOX
here with L facial numbness x 4 days. currently is asymptomatic. there is no neuro deficit on exam. pt is out of the TNK window. possibly neuropathy. will get CT head and CT angio. stroke consult PRN. reassess

## 2025-06-19 NOTE — ED PROVIDER NOTE - NSFOLLOWUPINSTRUCTIONS_ED_ALL_ED_FT
What is paresthesia? Paresthesia is numbness, tingling, or burning. It can happen in any part of your body, but usually occurs in your legs, feet, arms, or hands.    What causes paresthesia? A large number of conditions can cause paresthesia. Nerves that provide sensation are affected. Paresthesia happens because of changes in these nerves, or in nerve pathways. The changes can be temporary, such as if you take certain medicines or you are not getting enough vitamin B. Nerve damage can lead to permanent paresthesia. Conditions that may cause nerve damage include diabetes, carpel tunnel syndrome, stroke, and multiple sclerosis. The exact cause of your paresthesia may not be known.    What should I tell my healthcare provider about what I feel? You can help your healthcare provider by describing anything you feel, such as the following:    No feeling in the affected area    A feeling of pins and needles    An electric shock feeling    Heaviness    Trouble moving the affected area    A feeling that something is crawling under your skin    A feeling of burning or of cold in the affected area  How is paresthesia diagnosed? Your healthcare provider will examine you and ask about your symptoms. Tell your provider when the symptoms began. Include anything that makes your symptoms worse or better. Your provider will also need to know if you have a disease or condition that could be causing your symptoms. Tell him or her about the medicines you take. Include the amounts you take and when you take each medicine. You may also need any of the following:    Blood tests may show low levels of vitamin B or a high blood sugar level.    X-ray, MRI, or CT scan pictures may show damage to the area where you have paresthesia. You may be given contrast liquid to help the area show up better in the pictures. Tell the healthcare provider if you have ever had an allergic reaction to contrast liquid. Do not enter the MRI room with anything metal. Metal can cause severe injury. Tell the healthcare provider if you have any metal in or on your body.    Nerve conduction studies may be done to test your nerve function.  How is paresthesia treated? Treatment will depend on what is causing your paresthesia. You may need to increase the amount of vitamin B in your blood. Your healthcare provider may change or stop a medicine you are taking that is causing your symptoms. Permanent paresthesia may be helped with nerve medicine. If you have diabetes, your healthcare provider or diabetes specialist can help you control your blood sugar levels. Your provider may recommend a splint or surgery if you have paresthesia caused by carpal tunnel syndrome.    What can I do to manage paresthesia?    Protect the area from injury. You may injure or burn yourself if you lose feeling in the area. Be careful when you touch anything that could be hot. Wear sturdy shoes to protect your feet. Ask about other ways to protect yourself.    Go to physical or occupational therapy if directed. Your provider may recommend therapy if you have a condition such as carpal tunnel syndrome. A physical therapist can teach you exercises to help strengthen the area or increase your ability to move it. An occupational therapist can help you find new ways to do your daily activities.    Manage health conditions that can cause paresthesia. Work with your diabetes specialist if you have uncontrolled diabetes. A dietitian or your healthcare provider can help you create a meal plan if you have low vitamin B levels. Your provider can help you manage your health if you have multiple sclerosis or you had a stroke. It is important to manage health conditions to stop paresthesia or prevent it from getting worse.  When should I seek immediate care?    You have severe pain along with numbness and tingling.    Your legs suddenly become cold. You have trouble moving your legs, and they ache.    You have increased weakness in a part of your body.    You have uncontrolled movements.  When should I contact my healthcare provider?    Your symptoms do not improve.    You have symptoms in more than one part of your body.    You have questions or concerns about your condition or care.

## 2025-06-19 NOTE — ED ADULT TRIAGE NOTE - CHIEF COMPLAINT QUOTE
Pt presents to ED C/O L facial numbness with sinus pressure/ HA and "mouth feels twisted" x 5 days. Pt has hx of " blood clots on blood thinners" as per family.

## 2025-06-19 NOTE — ED ADULT NURSE NOTE - OBJECTIVE STATEMENT
80y M pmHx HTN, DM, PE on eliquis, parkinsons, autoimmune encephalitis presents to ED c/o L sided facial numbness x4 days. Denies speech changes, focal weakness, fall/trauma/injury, vision changes/dizziness. Pt AAOx4, speaking in full and clear sentences, NAD at this time. Strength and sensory intact on exam.

## 2025-06-19 NOTE — ED PROVIDER NOTE - CARE PROVIDER_API CALL
Lalita Fonseca  Neurology  [address] [address]  Phone: [phone]  Fax: [phone]  Follow Up Time:

## 2025-06-19 NOTE — ED PROVIDER NOTE - PROGRESS NOTE DETAILS
discussed with stroke team. no need for admission. recommends lipitor and asa. fu with neuro clinic outpt. pt has no numbness in the ED. return precaution given. will dc

## 2025-06-19 NOTE — ED PROVIDER NOTE - OBJECTIVE STATEMENT
79 yo M with pmh of HTN, DM, PE on eliquis, parkinsons, autoimmune encephalitis, p/w L facial numbness x 4 days. States it occurred spontaneously. states he has decreased sensation of the entire L face, including the forehead. currently denied any numbness in face. Denied numbness or weakness in limbs, drooping, dysarthria. occasional has L hip pain. no other complain.

## 2025-06-19 NOTE — ED PROVIDER NOTE - PATIENT PORTAL LINK FT
You can access the FollowMyHealth Patient Portal offered by [facility] by registering at the following website: [email or web address]. By joining [facility]’s FollowMyHealth portal, you will also be able to view your health information using other applications (apps) compatible with our system.

## 2025-06-19 NOTE — ED ADULT NURSE NOTE - NSFALLRISKASMTTYPE_ED_ALL_ED
Last appt 7/17/2024      Next Apt:     Future Appointments   Date Time Provider Department Center   1/22/2025  9:00 AM Rut Jackman MD [facility]         
Initial (On Arrival)

## 2025-06-20 ENCOUNTER — NON-APPOINTMENT (OUTPATIENT)
Age: 80
End: 2025-06-20

## 2025-07-01 ENCOUNTER — RX RENEWAL (OUTPATIENT)
Age: 80
End: 2025-07-01

## 2025-07-15 ENCOUNTER — APPOINTMENT (OUTPATIENT)
Dept: ORTHOPEDIC SURGERY | Facility: CLINIC | Age: 80
End: 2025-07-15

## 2025-07-31 ENCOUNTER — RX RENEWAL (OUTPATIENT)
Age: 80
End: 2025-07-31

## 2025-08-21 ENCOUNTER — NON-APPOINTMENT (OUTPATIENT)
Age: 80
End: 2025-08-21

## 2025-08-29 ENCOUNTER — APPOINTMENT (OUTPATIENT)
Dept: PULMONOLOGY | Facility: CLINIC | Age: 80
End: 2025-08-29
Payer: MEDICARE

## 2025-08-29 VITALS
HEART RATE: 70 BPM | TEMPERATURE: 98.2 F | HEIGHT: 67 IN | WEIGHT: 191.5 LBS | DIASTOLIC BLOOD PRESSURE: 66 MMHG | OXYGEN SATURATION: 96 % | SYSTOLIC BLOOD PRESSURE: 127 MMHG | BODY MASS INDEX: 30.06 KG/M2 | RESPIRATION RATE: 13 BRPM

## 2025-08-29 DIAGNOSIS — I27.21 SECONDARY PULMONARY ARTERIAL HYPERTENSION: ICD-10-CM

## 2025-08-29 DIAGNOSIS — I26.99 OTHER PULMONARY EMBOLISM W/OUT ACUTE COR PULMONALE: ICD-10-CM

## 2025-08-29 DIAGNOSIS — G47.33 OBSTRUCTIVE SLEEP APNEA (ADULT) (PEDIATRIC): ICD-10-CM

## 2025-08-29 PROCEDURE — G2211 COMPLEX E/M VISIT ADD ON: CPT

## 2025-08-29 PROCEDURE — 99215 OFFICE O/P EST HI 40 MIN: CPT

## 2025-09-02 ENCOUNTER — OUTPATIENT (OUTPATIENT)
Dept: OUTPATIENT SERVICES | Facility: HOSPITAL | Age: 80
LOS: 1 days | End: 2025-09-02
Payer: MEDICARE

## 2025-09-02 ENCOUNTER — APPOINTMENT (OUTPATIENT)
Dept: SLEEP CENTER | Facility: HOSPITAL | Age: 80
End: 2025-09-02

## 2025-09-02 DIAGNOSIS — G47.33 OBSTRUCTIVE SLEEP APNEA (ADULT) (PEDIATRIC): ICD-10-CM

## 2025-09-02 LAB
ALBUMIN SERPL ELPH-MCNC: 4.1 G/DL
ALP BLD-CCNC: 49 U/L
ALT SERPL-CCNC: 6 U/L
ANION GAP SERPL CALC-SCNC: 12 MMOL/L
AST SERPL-CCNC: 14 U/L
BILIRUB SERPL-MCNC: 0.5 MG/DL
BUN SERPL-MCNC: 12 MG/DL
CALCIUM SERPL-MCNC: 9.7 MG/DL
CHLORIDE SERPL-SCNC: 100 MMOL/L
CO2 SERPL-SCNC: 26 MMOL/L
CREAT SERPL-MCNC: 0.86 MG/DL
EGFRCR SERPLBLD CKD-EPI 2021: 89 ML/MIN/1.73M2
GLUCOSE SERPL-MCNC: 92 MG/DL
HCT VFR BLD CALC: 36.4 %
HGB BLD-MCNC: 11.5 G/DL
MCHC RBC-ENTMCNC: 27.7 PG
MCHC RBC-ENTMCNC: 31.6 G/DL
MCV RBC AUTO: 87.7 FL
NT-PROBNP SERPL-MCNC: <36 PG/ML
PLATELET # BLD AUTO: 205 K/UL
POTASSIUM SERPL-SCNC: 4.2 MMOL/L
PROT SERPL-MCNC: 7.5 G/DL
RBC # BLD: 4.15 M/UL
RBC # FLD: 15.8 %
SODIUM SERPL-SCNC: 139 MMOL/L
WBC # FLD AUTO: 5.9 K/UL

## 2025-09-02 PROCEDURE — 95811 POLYSOM 6/>YRS CPAP 4/> PARM: CPT | Mod: 26

## 2025-09-11 ENCOUNTER — APPOINTMENT (OUTPATIENT)
Dept: NEUROLOGY | Age: 80
End: 2025-09-11
Payer: MEDICARE

## 2025-09-11 ENCOUNTER — NON-APPOINTMENT (OUTPATIENT)
Age: 80
End: 2025-09-11

## 2025-09-11 VITALS
WEIGHT: 189 LBS | SYSTOLIC BLOOD PRESSURE: 122 MMHG | HEART RATE: 84 BPM | BODY MASS INDEX: 29.66 KG/M2 | TEMPERATURE: 85 F | HEIGHT: 67 IN | RESPIRATION RATE: 17 BRPM | OXYGEN SATURATION: 98 % | DIASTOLIC BLOOD PRESSURE: 70 MMHG

## 2025-09-11 DIAGNOSIS — I87.8 OTHER SPECIFIED DISORDERS OF VEINS: ICD-10-CM

## 2025-09-11 DIAGNOSIS — G20.C PARKINSONISM, UNSPECIFIED: ICD-10-CM

## 2025-09-11 DIAGNOSIS — G51.0 BELL'S PALSY: ICD-10-CM

## 2025-09-11 DIAGNOSIS — G04.81 OTHER ENCEPHALITIS AND ENCEPHALOMYELITIS: ICD-10-CM

## 2025-09-11 PROCEDURE — 99215 OFFICE O/P EST HI 40 MIN: CPT

## 2025-09-11 RX ORDER — ARTIFICIAL TEARS 1; 2; 3 MG/ML; MG/ML; MG/ML
SOLUTION/ DROPS OPHTHALMIC
Qty: 1 | Refills: 0 | Status: ACTIVE | COMMUNITY
Start: 2025-09-11 | End: 1900-01-01

## 2025-09-11 RX ORDER — ROSUVASTATIN CALCIUM 10 MG/1
10 TABLET, FILM COATED ORAL DAILY
Refills: 0 | Status: ACTIVE | COMMUNITY

## 2025-09-11 RX ORDER — MIRTAZAPINE 7.5 MG/1
7.5 TABLET, FILM COATED ORAL
Qty: 90 | Refills: 0 | Status: ACTIVE | COMMUNITY
Start: 2025-09-11 | End: 1900-01-01